# Patient Record
Sex: FEMALE | Race: WHITE | NOT HISPANIC OR LATINO | Employment: FULL TIME | URBAN - METROPOLITAN AREA
[De-identification: names, ages, dates, MRNs, and addresses within clinical notes are randomized per-mention and may not be internally consistent; named-entity substitution may affect disease eponyms.]

---

## 2020-05-26 ENCOUNTER — INITIAL PRENATAL (OUTPATIENT)
Dept: OBSTETRICS AND GYNECOLOGY | Facility: CLINIC | Age: 30
End: 2020-05-26
Payer: MEDICAID

## 2020-05-26 VITALS
HEIGHT: 62 IN | BODY MASS INDEX: 25.64 KG/M2 | SYSTOLIC BLOOD PRESSURE: 102 MMHG | DIASTOLIC BLOOD PRESSURE: 58 MMHG | WEIGHT: 139.31 LBS

## 2020-05-26 DIAGNOSIS — Z3A.20 20 WEEKS GESTATION OF PREGNANCY: Primary | ICD-10-CM

## 2020-05-26 DIAGNOSIS — Z34.92 PRENATAL CARE IN SECOND TRIMESTER: ICD-10-CM

## 2020-05-26 PROCEDURE — 99214 OFFICE O/P EST MOD 30 MIN: CPT | Mod: TH,S$PBB,, | Performed by: NURSE PRACTITIONER

## 2020-05-26 PROCEDURE — 99999 PR PBB SHADOW E&M-NEW PATIENT-LVL III: CPT | Mod: PBBFAC,,, | Performed by: NURSE PRACTITIONER

## 2020-05-26 PROCEDURE — 99203 OFFICE O/P NEW LOW 30 MIN: CPT | Mod: PBBFAC | Performed by: NURSE PRACTITIONER

## 2020-05-26 PROCEDURE — 99214 PR OFFICE/OUTPT VISIT, EST, LEVL IV, 30-39 MIN: ICD-10-PCS | Mod: TH,S$PBB,, | Performed by: NURSE PRACTITIONER

## 2020-05-26 PROCEDURE — 99999 PR PBB SHADOW E&M-NEW PATIENT-LVL III: ICD-10-PCS | Mod: PBBFAC,,, | Performed by: NURSE PRACTITIONER

## 2020-05-26 NOTE — PROGRESS NOTES
CC: transfer OB from [place]    30 y.o.,  at 20w4d     Patient presents today for a transfer initial prenatal visit. She reports she has been receiving regular, routine prenatal care at [facility] .  Patient has not completed a Meet & Greet tour of [facility].  She is here today alone.      Complaints today: none.  Doing well overall. + FM, denies LOF, VB or ctx.     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 She also has a mole on her face that she believes has changes recently. Will be seeing dermatology about this for evaluation.     SOCIAL HISTORY: Denies emotional/mental/physical/sexual violence or abuse. Feels safe at home. Accompanied today by no one. This is pt's 3rd pregnancy. Had 1 SAB. Has one living child - vaginal/41w0d/8#12oz. Pt very concerned because she feels first baby was big and has a lot of anxiety about delivering again. May want epidural.     Patient reports her prepregnancy weight: 130lbs. TW lbs     Patient reports most recent pap smear: 2019, results: normal - denies abnormals in past    ROS  OBSTETRICS:   Contractions No   Bleeding No   Loss of fluid No   Fetal mvmnt:   present    GASTRO:   Nausea No   Vomiting No      OB History    Para Term  AB Living   3 1 1   1 1   SAB TAB Ectopic Multiple Live Births   1       1      # Outcome Date GA Lbr Tavares/2nd Weight Sex Delivery Anes PTL Lv   3 Current            2 Term  41w0d  3.969 kg (8 lb 12 oz)  Vag-Spont   MATHEUS   1 SAB                Dating reviewed  Allergies and problem list reviewed and updated  Medical and surgical history reviewed    PHYSICAL EXAM  BP (!) 102/58   Wt 63.2 kg (139 lb 5.3 oz)   LMP 2020     GENERAL: No acute distress  HEENT: Normocephalic, atruamatic  NEURO: Alert and oriented x3  PSYCH: Calm, normal mood and affect  PULMONARY: Non-labored respiration; no tachypnea  ABD: Soft, gravid, nontender    ASSESSMENT AND  PLAN    MACRINA BARBOZA Problems (from 05/26/20 to present)     No problems associated with this episode.            Patient does have copy of prenatal records here with her today. Records in care everywhere tab. Anatomy scan in care everywhere tab. NIPT - neg  Initial labs and dating u/s reviewed.   Counseled today on proper weight gain based on the [place] [employer]'s recommendations based on her pre-pregnancy weight. Discussed excessive weight gain allowance, which would risk her out of the ABC (and would plan for delivery on L&D), but not midwifery care. Reviewed potential risks to excessive weight gain.  .BMI (prepregnancy)  -- Discussed IOM recommended weight gain of:   Weight category Pre pre BMI  Recommended TWG   Underweight Less than 18.5 28-40    Normal Weight 18.5-24.9  25-35    Overweight 25-29.9  15-25    Obese   30 and greater  11-20   -- Discussed criteria for delivery at [facility] r/t excessive pre-preg weight or excessive weight gain:   Pre-pregnancy BMI over 40 or excess pregnancy weight gain defined as:   Pre-preg BMI < 18.5; Excess weight gain = > 60 pound   Pre-preg BMI 18.5-24.9;  Excess weight gain = > 53 pounds   Pre-preg BMI 25-29.9;  Excess weight gain = > 38 pounds   Pre-preg BMI > 30;  Excess weight gain = > 30 pounds    Review exercise precautions in pregnancy, along with recommendations. She does exercise regularly.   Discussed substances & foods to avoid in pregnancy (i.e. sushi, fish that are high in mercury, deli meat, and unpasteurized cheeses).   Discussed prenatal vitamin options (i.e. stool softener, DHA).    Education regarding CDC recommendations for TDAP in pregnancy, reviewed risks/benefits to this.   Patient was oriented to practice and progression of routine prenatal care.   Reviewed New OB Pregnancy packet & questions answered.    Education regarding warning signs.      Follow up: 4 wks, call or present sooner prn.

## 2020-06-22 ENCOUNTER — TELEPHONE (OUTPATIENT)
Dept: OBSTETRICS AND GYNECOLOGY | Facility: CLINIC | Age: 30
End: 2020-06-22

## 2020-06-22 NOTE — TELEPHONE ENCOUNTER
Returned call to pt.  Pt called with c/o lower abdominal pain that is sharp happens every 2-4 mins and last for a couple of seconds for the last half hour.  Pt reports positive fetal movements,   Denies vaginal bleeding or leaking of fluids.  Pt was advised that she can come to the OB ED for evaluation or that she can go to the closest hospital for evaluation.  Pt wanted to wait until her appointment tomorrow.  I advised pt that she should increase her fluids and take Tylenol (2-reg, 1 xtra strength) and see if that helps.  If no improvement we would recommend her to come in for evaluation.  Pt verbalized understanding       ----- Message from Jefry Easton sent at 6/22/2020 12:09 PM CDT -----  Regarding: Patient Advice  Contact: MACRINA BARBOZA [[id number]]  Name of Who is Calling: MACRINA BARBOZA [[id number]]      What is the request in detail: Patient 6 mth ob, would like to speak with staff in regards to discomfort of sharp abdominal pain; intermittent every few minutes. States she was only doing household chores. Please advise      Can the clinic reply by [facility]: no      What Number to [facility] Back [facility] not [facility]: [phone]

## 2020-06-23 ENCOUNTER — ROUTINE PRENATAL (OUTPATIENT)
Dept: OBSTETRICS AND GYNECOLOGY | Facility: CLINIC | Age: 30
End: 2020-06-23
Payer: MEDICAID

## 2020-06-23 VITALS — SYSTOLIC BLOOD PRESSURE: 110 MMHG | WEIGHT: 145.5 LBS | DIASTOLIC BLOOD PRESSURE: 72 MMHG | BODY MASS INDEX: 26.61 KG/M2

## 2020-06-23 DIAGNOSIS — Z34.90 PREGNANCY, UNSPECIFIED GESTATIONAL AGE: ICD-10-CM

## 2020-06-23 DIAGNOSIS — Z34.93 PRENATAL CARE IN THIRD TRIMESTER: Primary | ICD-10-CM

## 2020-06-23 PROBLEM — D25.9 UTERINE FIBROID: Status: ACTIVE | Noted: 2019-02-14

## 2020-06-23 PROBLEM — G43.909 MIGRAINE HEADACHE: Status: ACTIVE | Noted: 2019-02-14

## 2020-06-23 PROBLEM — G43.009 MIGRAINE WITHOUT AURA, NOT INTRACTABLE, WITHOUT STATUS MIGRAINOSUS: Status: ACTIVE | Noted: 2019-09-25

## 2020-06-23 PROCEDURE — 99212 PR OFFICE/OUTPT VISIT, EST, LEVL II, 10-19 MIN: ICD-10-PCS | Mod: S$PBB,TH,, | Performed by: ADVANCED PRACTICE MIDWIFE

## 2020-06-23 PROCEDURE — 99212 OFFICE O/P EST SF 10 MIN: CPT | Mod: PBBFAC,TH | Performed by: ADVANCED PRACTICE MIDWIFE

## 2020-06-23 PROCEDURE — 99999 PR PBB SHADOW E&M-EST. PATIENT-LVL II: ICD-10-PCS | Mod: PBBFAC,,, | Performed by: ADVANCED PRACTICE MIDWIFE

## 2020-06-23 PROCEDURE — 99999 PR PBB SHADOW E&M-EST. PATIENT-LVL II: CPT | Mod: PBBFAC,,, | Performed by: ADVANCED PRACTICE MIDWIFE

## 2020-06-23 PROCEDURE — 99212 OFFICE O/P EST SF 10 MIN: CPT | Mod: S$PBB,TH,, | Performed by: ADVANCED PRACTICE MIDWIFE

## 2020-06-23 NOTE — PROGRESS NOTES
No chief complaint on file.      30 y.o. female  at 24w4d, by Estimated Date of Delivery: 10/9/20    Complaints today: possible vulvar varicosity. Doing well today.   Reviewed TWG: 15 lbs    ROS  OBSTETRICS:   Contractions No   Bleeding No   Loss of fluid No   Fetal mvmnt present  GASTRO:   Nausea No   Vomiting No      OB History    Para Term  AB Living   3 1 1   1 1   SAB TAB Ectopic Multiple Live Births   1       1      # Outcome Date GA Lbr Tavares/2nd Weight Sex Delivery Anes PTL Lv   3 Current            2 Term  41w0d  3.969 kg (8 lb 12 oz)  Vag-Spont   MATHEUS   1 SAB                Dating reviewed  Allergies and problem list reviewed and updated  Medical and surgical history reviewed  Prenatal labs reviewed and updated    PHYSICAL EXAM  /72   Wt 66 kg (145 lb 8.1 oz)   LMP 2020   BMI 26.61 kg/m²     GENERAL: No acute distress  HEENT: Normocephalic, atraumatic  NEURO: Alert and oriented x3  PSYCH: Normal mood and affect  PULMONARY: Non-labored respiration; no tachypnea  ABD: Soft, gravid, nontender      ASSESSMENT AND PLAN    MACRINA BARBOZA Problems (from 20 to present)     No problems associated with this episode.            Reviewed upcoming 28wk labs, () and orders placed  Education regarding warning signs of PreEclampsia, reviewed normal FM,  labor precautions, and how/when to call.    Follow-up: 4 weeks, call or present sooner PASTORA Barker CNM, MSN  2020  4:40 PM

## 2020-07-03 ENCOUNTER — TELEPHONE (OUTPATIENT)
Dept: OBSTETRICS AND GYNECOLOGY | Facility: HOSPITAL | Age: 30
End: 2020-07-03

## 2020-07-03 NOTE — TELEPHONE ENCOUNTER
Called and spoke with , Duy.     Pt prescribed Tobramax eye drops (steroid and antimicrobial) for a painful corneal abrasion she sustained this morning. Reviewed with her partner that we don't have great evidence on this med but reviewed use of steroids, etc and systemic absorption being minimal.     Zahra

## 2020-07-03 NOTE — TELEPHONE ENCOUNTER
Attempted to call pt back. She called to discuss safety of eye drops in pregnancy per RN. No answer and mailbox full.   Zahra

## 2020-07-05 PROBLEM — Z34.90 PREGNANCY: Status: ACTIVE | Noted: 2020-07-05

## 2020-07-21 ENCOUNTER — LAB VISIT (OUTPATIENT)
Dept: LAB | Facility: OTHER | Age: 30
End: 2020-07-21
Attending: ADVANCED PRACTICE MIDWIFE
Payer: MEDICAID

## 2020-07-21 ENCOUNTER — ROUTINE PRENATAL (OUTPATIENT)
Dept: OBSTETRICS AND GYNECOLOGY | Facility: CLINIC | Age: 30
End: 2020-07-21
Payer: MEDICAID

## 2020-07-21 VITALS — BODY MASS INDEX: 27.42 KG/M2 | SYSTOLIC BLOOD PRESSURE: 98 MMHG | DIASTOLIC BLOOD PRESSURE: 60 MMHG | WEIGHT: 149.94 LBS

## 2020-07-21 DIAGNOSIS — Z34.93 PREGNANT AND NOT YET DELIVERED IN THIRD TRIMESTER: Primary | ICD-10-CM

## 2020-07-21 DIAGNOSIS — Z34.93 PRENATAL CARE IN THIRD TRIMESTER: ICD-10-CM

## 2020-07-21 LAB
BASOPHILS # BLD AUTO: 0.04 K/UL (ref 0–0.2)
BASOPHILS NFR BLD: 0.3 % (ref 0–1.9)
DIFFERENTIAL METHOD: ABNORMAL
EOSINOPHIL # BLD AUTO: 0.2 K/UL (ref 0–0.5)
EOSINOPHIL NFR BLD: 1.9 % (ref 0–8)
ERYTHROCYTE [DISTWIDTH] IN BLOOD BY AUTOMATED COUNT: 12.8 % (ref 11.5–14.5)
GLUCOSE SERPL-MCNC: 72 MG/DL (ref 70–140)
HCT VFR BLD AUTO: 36.2 % (ref 37–48.5)
HGB BLD-MCNC: 11.6 G/DL (ref 12–16)
IMM GRANULOCYTES # BLD AUTO: 0.15 K/UL (ref 0–0.04)
IMM GRANULOCYTES NFR BLD AUTO: 1.3 % (ref 0–0.5)
LYMPHOCYTES # BLD AUTO: 1.4 K/UL (ref 1–4.8)
LYMPHOCYTES NFR BLD: 11.8 % (ref 18–48)
MCH RBC QN AUTO: 29.8 PG (ref 27–31)
MCHC RBC AUTO-ENTMCNC: 32 G/DL (ref 32–36)
MCV RBC AUTO: 93 FL (ref 82–98)
MONOCYTES # BLD AUTO: 0.9 K/UL (ref 0.3–1)
MONOCYTES NFR BLD: 7.6 % (ref 4–15)
NEUTROPHILS # BLD AUTO: 8.9 K/UL (ref 1.8–7.7)
NEUTROPHILS NFR BLD: 77.1 % (ref 38–73)
NRBC BLD-RTO: 0 /100 WBC
PLATELET # BLD AUTO: 188 K/UL (ref 150–350)
PMV BLD AUTO: 10.4 FL (ref 9.2–12.9)
RBC # BLD AUTO: 3.89 M/UL (ref 4–5.4)
WBC # BLD AUTO: 11.52 K/UL (ref 3.9–12.7)

## 2020-07-21 PROCEDURE — 99212 OFFICE O/P EST SF 10 MIN: CPT | Mod: PBBFAC | Performed by: ADVANCED PRACTICE MIDWIFE

## 2020-07-21 PROCEDURE — 99999 PR PBB SHADOW E&M-EST. PATIENT-LVL II: ICD-10-PCS | Mod: PBBFAC,,, | Performed by: ADVANCED PRACTICE MIDWIFE

## 2020-07-21 PROCEDURE — 0502F PR SUBSEQUENT PRENATAL CARE: ICD-10-PCS | Mod: ,,, | Performed by: ADVANCED PRACTICE MIDWIFE

## 2020-07-21 PROCEDURE — 85025 COMPLETE CBC W/AUTO DIFF WBC: CPT

## 2020-07-21 PROCEDURE — 36415 COLL VENOUS BLD VENIPUNCTURE: CPT

## 2020-07-21 PROCEDURE — 99999 PR PBB SHADOW E&M-EST. PATIENT-LVL II: CPT | Mod: PBBFAC,,, | Performed by: ADVANCED PRACTICE MIDWIFE

## 2020-07-21 PROCEDURE — 82950 GLUCOSE TEST: CPT

## 2020-07-21 PROCEDURE — 0502F SUBSEQUENT PRENATAL CARE: CPT | Mod: ,,, | Performed by: ADVANCED PRACTICE MIDWIFE

## 2020-07-21 RX ORDER — TOBRAMYCIN AND DEXAMETHASONE 3; 1 MG/ML; MG/ML
SUSPENSION/ DROPS OPHTHALMIC
COMMUNITY
Start: 2020-07-03 | End: 2020-10-15

## 2020-07-21 NOTE — PROGRESS NOTES
Chief Complaint   Patient presents with    Routine Prenatal Visit       30 y.o. female  at 28w4d by Estimated Date of Delivery: 10/9/20    Complaints today: none - feeling much better overall  Reviewed TWlbs    ROS  OBSTETRICS:   Contractions No   Bleeding No   Loss of fluid No   Fetal mvmnt +  GASTRO:   Nausea No   Vomiting No      OB History    Para Term  AB Living   3 1 1   1 1   SAB TAB Ectopic Multiple Live Births   1       1      # Outcome Date GA Lbr Tavares/2nd Weight Sex Delivery Anes PTL Lv   3 Current            2 Term  41w0d  3.969 kg (8 lb 12 oz)  Vag-Spont   MATHEUS   1 SAB                Dating reviewed  Allergies and problem list reviewed and updated  Medical and surgical history reviewed  Prenatal labs reviewed and updated    PHYSICAL EXAM  BP 98/60   Wt 68 kg (149 lb 14.6 oz)   LMP 2020   BMI 27.42 kg/m²     GENERAL: No acute distress  HEENT: Normocephalic, atraumatic  NEURO: Alert and oriented x3  PSYCH: Normal mood and affect  PULMONARY: Non-labored respiration; no tachypnea  ABD: Soft, gravid, nontender.    ASSESSMENT AND PLAN    MACRINA BARBOZA Problems (from 20 to present)     Problem Noted Resolved    Pregnancy 2020 by Rajani Barker CNM No    Overview Signed 2020  9:34 PM by Rajani Barker CNM     Prepregnancy BMI: 23 (ABC max wt: 53lb)  Pap:   Dating - per LMP confirmed via 7 wk sonogram  U/S -see care everywhere  Aneuploidy screening -NIPT normal  Blood type: A pos. Rhogam: Date:  GDM screen -   Vaccines - [ ]Flu [ ]TDAP  GBS  [ ]Consents  Contraception -  Peds -   Circ -                  Completing 28wk labs today.   Blood type:A +     Reviewed warning signs, normal FKCs,  labor precautions and how/when to call.    Follow-up: 2 weeks, call or present sooner PRN.  Birth Center Risk Assessment: 0- Meets birth center guidelines    0- CNM management in ABC  1- CNM management on L&D  2- Consultation with OB to develop  plan of  care  3- Collaborative CNM/OB management with delivery on L&D  Permanent referral of care to MD

## 2020-08-04 ENCOUNTER — ROUTINE PRENATAL (OUTPATIENT)
Dept: OBSTETRICS AND GYNECOLOGY | Facility: CLINIC | Age: 30
End: 2020-08-04
Payer: MEDICAID

## 2020-08-04 VITALS
BODY MASS INDEX: 27.82 KG/M2 | DIASTOLIC BLOOD PRESSURE: 64 MMHG | WEIGHT: 152.13 LBS | SYSTOLIC BLOOD PRESSURE: 100 MMHG

## 2020-08-04 DIAGNOSIS — N76.0 VAGINAL INFECTION: ICD-10-CM

## 2020-08-04 DIAGNOSIS — Z34.90 PREGNANCY WITH ONE FETUS, ANTEPARTUM: Primary | ICD-10-CM

## 2020-08-04 PROCEDURE — 87510 GARDNER VAG DNA DIR PROBE: CPT

## 2020-08-04 PROCEDURE — 99213 PR OFFICE/OUTPT VISIT, EST, LEVL III, 20-29 MIN: ICD-10-PCS | Mod: TH,S$PBB,, | Performed by: ADVANCED PRACTICE MIDWIFE

## 2020-08-04 PROCEDURE — 99999 PR PBB SHADOW E&M-EST. PATIENT-LVL II: ICD-10-PCS | Mod: PBBFAC,,, | Performed by: ADVANCED PRACTICE MIDWIFE

## 2020-08-04 PROCEDURE — 87661 TRICHOMONAS VAGINALIS AMPLIF: CPT

## 2020-08-04 PROCEDURE — 99213 OFFICE O/P EST LOW 20 MIN: CPT | Mod: TH,S$PBB,, | Performed by: ADVANCED PRACTICE MIDWIFE

## 2020-08-04 PROCEDURE — 87801 DETECT AGNT MULT DNA AMPLI: CPT

## 2020-08-04 PROCEDURE — 99999 PR PBB SHADOW E&M-EST. PATIENT-LVL II: CPT | Mod: PBBFAC,,, | Performed by: ADVANCED PRACTICE MIDWIFE

## 2020-08-04 PROCEDURE — 99212 OFFICE O/P EST SF 10 MIN: CPT | Mod: PBBFAC,TH | Performed by: ADVANCED PRACTICE MIDWIFE

## 2020-08-04 PROCEDURE — 87481 CANDIDA DNA AMP PROBE: CPT | Mod: 59

## 2020-08-04 NOTE — PROGRESS NOTES
No chief complaint on file.      30 y.o. female  at 30w4d, by Estimated Date of Delivery: 10/9/20    Complaints today: Here alone. Doing well today.  Questions answered, Having odor vaginally since ~ 20wks, whitish sticky d/c. Sticky d/c, no itching or irritation.   Having random BH  Culture done      Reviewed TW lbs    ROS  OBSTETRICS:   Contractions No   Bleeding No   Loss of fluid No   Fetal mvmnt yes  GASTRO:   Nausea No   Vomiting No      OB History    Para Term  AB Living   3 1 1   1 1   SAB TAB Ectopic Multiple Live Births   1       1      # Outcome Date GA Lbr Tavares/2nd Weight Sex Delivery Anes PTL Lv   3 Current            2 Term  41w0d  3.969 kg (8 lb 12 oz)  Vag-Spont   MATHEUS   1 SAB                Dating reviewed  Allergies and problem list reviewed and updated  Medical and surgical history reviewed  Prenatal labs reviewed and updated    PHYSICAL EXAM  /64   Wt 69 kg (152 lb 1.9 oz)   LMP 2020   BMI 27.82 kg/m²     GENERAL: No acute distress  HEENT: Normocephalic, atraumatic  NEURO: Alert and oriented x3  PSYCH: Normal mood and affect  PULMONARY: Non-labored respiration; no tachypnea  ABD: Soft, gravid, nontender.      ASSESSMENT AND PLAN    MACRINA BARBOZA Problems (from 20 to present)     Problem Noted Resolved    Pregnancy 2020 by Rajani Barker CNM No    Overview Signed 2020  9:34 PM by Rajani Barker CNM     Prepregnancy BMI: 23 (ABC max wt: 53lb)  Pap:   Dating - per LMP confirmed via 7 wk sonogram  U/S -see care everywhere  Aneuploidy screening -NIPT normal  Blood type: A pos. Rhogam: Date:  GDM screen -   Vaccines - [ ]Flu [ ]TDAP  GBS  [ ]Consents  Contraception -  Peds -   Circ -                  Reviewed 28wk labs  Declines TDAP today   Is not taking Childbirth Education classes.  Ordered growth U/S      Reviewed warning signs, normal FM,  labor precautions, and how/when to call.    Birth Center Risk Assessment: 0- Meets birth  center guidelines    0- CNM management in ABC  1- CNM management on L&D  2- Consultation with OB to develop  plan of care  3- Collaborative CNM/OB management with delivery on L&D  4- Permanent referral of care to MD      Follow-up: 2 weeks, call or present sooner PRN

## 2020-08-06 ENCOUNTER — TELEPHONE (OUTPATIENT)
Dept: OBSTETRICS AND GYNECOLOGY | Facility: CLINIC | Age: 30
End: 2020-08-06

## 2020-08-06 NOTE — TELEPHONE ENCOUNTER
Returned call to pt.  Advised pt that lab was still in process.  Pt also request to know if she could get over the counter Monistat to help treat the discharge and her discomfort.  Spoke with ROSALIA Sweeney and was advised OK for pt to use Monistat 7 day tx.  Pt verbalized understanding and was advised we will contact with results when they are received         ----- Message from Cece Penaloza sent at 8/6/2020  9:35 AM CDT -----  Name of Who is Calling: MACRINA BARBOZA [[id number]]    What is the request in detail: Would like to speak with staff in regards to test results. Please contact to further discuss and advise      Can the clinic reply by [facility]: no    What Number to Call Back [facility] not [facility]: [phone]

## 2020-08-07 LAB
CANDIDA RRNA VAG QL PROBE: NEGATIVE
G VAGINALIS RRNA GENITAL QL PROBE: NEGATIVE
T VAGINALIS RRNA GENITAL QL PROBE: NEGATIVE

## 2020-08-17 ENCOUNTER — ROUTINE PRENATAL (OUTPATIENT)
Dept: OBSTETRICS AND GYNECOLOGY | Facility: CLINIC | Age: 30
End: 2020-08-17
Payer: MEDICAID

## 2020-08-17 VITALS
SYSTOLIC BLOOD PRESSURE: 102 MMHG | BODY MASS INDEX: 28.49 KG/M2 | WEIGHT: 155.75 LBS | DIASTOLIC BLOOD PRESSURE: 58 MMHG

## 2020-08-17 DIAGNOSIS — Z13.9 RISK AND FUNCTIONAL ASSESSMENT: ICD-10-CM

## 2020-08-17 DIAGNOSIS — N89.8 VAGINAL ODOR: ICD-10-CM

## 2020-08-17 DIAGNOSIS — O22.10 VULVAR VARICES DURING PREGNANCY: ICD-10-CM

## 2020-08-17 DIAGNOSIS — Z34.90 PREGNANCY, UNSPECIFIED GESTATIONAL AGE: Primary | ICD-10-CM

## 2020-08-17 PROCEDURE — 87510 GARDNER VAG DNA DIR PROBE: CPT

## 2020-08-17 PROCEDURE — 0502F PR SUBSEQUENT PRENATAL CARE: ICD-10-PCS | Mod: ,,, | Performed by: ADVANCED PRACTICE MIDWIFE

## 2020-08-17 PROCEDURE — 87086 URINE CULTURE/COLONY COUNT: CPT

## 2020-08-17 PROCEDURE — 87481 CANDIDA DNA AMP PROBE: CPT | Mod: 59

## 2020-08-17 PROCEDURE — 0502F SUBSEQUENT PRENATAL CARE: CPT | Mod: ,,, | Performed by: ADVANCED PRACTICE MIDWIFE

## 2020-08-17 PROCEDURE — 99999 PR PBB SHADOW E&M-EST. PATIENT-LVL III: ICD-10-PCS | Mod: PBBFAC,,, | Performed by: ADVANCED PRACTICE MIDWIFE

## 2020-08-17 PROCEDURE — 87801 DETECT AGNT MULT DNA AMPLI: CPT

## 2020-08-17 PROCEDURE — 99999 PR PBB SHADOW E&M-EST. PATIENT-LVL III: CPT | Mod: PBBFAC,,, | Performed by: ADVANCED PRACTICE MIDWIFE

## 2020-08-17 PROCEDURE — 99213 OFFICE O/P EST LOW 20 MIN: CPT | Mod: PBBFAC,TH | Performed by: ADVANCED PRACTICE MIDWIFE

## 2020-08-17 NOTE — PROGRESS NOTES
Chief Complaint   Patient presents with    Routine Prenatal Visit     30 y.o. female  at 32w2d, by Estimated Date of Delivery: 10/9/20    Doing well overall today.    Reviewed TW lbs    BMI  -- Discussed IOM recommended weight gain of:   Normal Weight 18.5-24.9  25-35   -- Discussed criteria for delivery at [facility] r/t excessive pre-preg weight or excessive weight gain:   Pre-pregnancy BMI over 40 or excess pregnancy weight gain defined as:   Pre-preg BMI 18.5-24.9;  Excess weight gain = > 53 pounds    ROS  OBSTETRICS:   Contractions No   Bleeding No   Loss of fluid No   Fetal mvmnt Yes  GASTRO:   Nausea No   Vomiting No      OB History    Para Term  AB Living   3 1 1   1 1   SAB TAB Ectopic Multiple Live Births   1       1      # Outcome Date GA Lbr Tavares/2nd Weight Sex Delivery Anes PTL Lv   3 Current            2 Term  41w0d  3.969 kg (8 lb 12 oz)  Vag-Spont   MATHEUS   1 SAB                Dating reviewed  Allergies and problem list reviewed and updated  Medical and surgical history reviewed  Prenatal labs reviewed and updated    PHYSICAL EXAM  BP (!) 102/58   Wt 70.7 kg (155 lb 12.1 oz)   LMP 2020   BMI 28.49 kg/m²     GENERAL: No acute distress  HEENT: Normocephalic, atraumatic  NEURO: Alert and oriented x3  PSYCH: Normal mood and affect  PULMONARY: Non-labored respiration; no tachypnea  ABD: Soft, gravid, nontender.      ASSESSMENT AND PLAN    MACRINA BARBOZA Problems (from 20 to present)     Problem Noted Resolved    Pregnancy 2020 by Rajani Barker CNM No    Overview Signed 2020  9:34 PM by Rajani Barker CNM     Prepregnancy BMI: 23 (ABC max wt: 53lb)  Pap:   Dating - per LMP confirmed via 7 wk sonogram  U/S -see care everywhere  Aneuploidy screening -NIPT normal  Blood type: A pos. Rhogam: Date:  GDM screen -   Vaccines - [ ]Flu [ ]TDAP  GBS  [ ]Consents  Contraception -  Peds -   Circ -                  Patient plans to breast feed - reviewed breast feeding  "class here.  Will send message about classes via [facility].    Pediatrician: Thinking about it    Need T&S repeat with 36 wk labs because other one is from outside facility. Patient agrees to this.    36 wk growth US is scheduled for . It's a girl!! Will check to see her size. Last birth was traumatic with large baby. Pt states that she may want epidural.    Meds: PNV    She reports noticing white discharge and an odd smell over the past several weeks. She states that the smell is not "fishy" and just smells "different than usual". She reports vulvar varicosities, and I visualized that she does have mild bilateral vulvar varicosities - these have not been bothering her, but she wanted me to take a look to confirm. Also collected vaginosis swab. Did not notice any odor while collecting specimen, and did not notice any vaginal discharge.    Reviewed warning signs, normal FM,  labor precautions, and how/when to call.  Confirmed pt has after-hours number.    Follow-up: 2 weeks, call or present sooner PRN      "

## 2020-08-18 LAB
BACTERIA UR CULT: NORMAL
BACTERIA UR CULT: NORMAL
CANDIDA RRNA VAG QL PROBE: NEGATIVE
G VAGINALIS RRNA GENITAL QL PROBE: NEGATIVE
T VAGINALIS RRNA GENITAL QL PROBE: NEGATIVE

## 2020-08-24 PROBLEM — Z13.9 RISK AND FUNCTIONAL ASSESSMENT: Status: ACTIVE | Noted: 2020-08-24

## 2020-08-24 PROBLEM — O22.10: Status: ACTIVE | Noted: 2020-08-24

## 2020-09-02 ENCOUNTER — ROUTINE PRENATAL (OUTPATIENT)
Dept: OBSTETRICS AND GYNECOLOGY | Facility: CLINIC | Age: 30
End: 2020-09-02
Payer: MEDICAID

## 2020-09-02 ENCOUNTER — CLINICAL SUPPORT (OUTPATIENT)
Dept: OBSTETRICS AND GYNECOLOGY | Facility: CLINIC | Age: 30
End: 2020-09-02
Payer: MEDICAID

## 2020-09-02 VITALS
DIASTOLIC BLOOD PRESSURE: 70 MMHG | SYSTOLIC BLOOD PRESSURE: 106 MMHG | BODY MASS INDEX: 28.55 KG/M2 | WEIGHT: 156.06 LBS

## 2020-09-02 DIAGNOSIS — Z23 NEED FOR DIPHTHERIA-TETANUS-PERTUSSIS (TDAP) VACCINE: Primary | ICD-10-CM

## 2020-09-02 DIAGNOSIS — Z34.93 PREGNANT AND NOT YET DELIVERED IN THIRD TRIMESTER: Primary | ICD-10-CM

## 2020-09-02 PROCEDURE — 99999 PR PBB SHADOW E&M-EST. PATIENT-LVL II: CPT | Mod: PBBFAC,,, | Performed by: ADVANCED PRACTICE MIDWIFE

## 2020-09-02 PROCEDURE — 99999 PR PBB SHADOW E&M-EST. PATIENT-LVL I: CPT | Mod: PBBFAC,,,

## 2020-09-02 PROCEDURE — 99212 OFFICE O/P EST SF 10 MIN: CPT | Mod: PBBFAC,25 | Performed by: ADVANCED PRACTICE MIDWIFE

## 2020-09-02 PROCEDURE — 99212 OFFICE O/P EST SF 10 MIN: CPT | Mod: TH,S$PBB,, | Performed by: ADVANCED PRACTICE MIDWIFE

## 2020-09-02 PROCEDURE — 90471 IMMUNIZATION ADMIN: CPT | Mod: PBBFAC

## 2020-09-02 PROCEDURE — 99999 PR PBB SHADOW E&M-EST. PATIENT-LVL II: ICD-10-PCS | Mod: PBBFAC,,, | Performed by: ADVANCED PRACTICE MIDWIFE

## 2020-09-02 PROCEDURE — 99999 PR PBB SHADOW E&M-EST. PATIENT-LVL I: ICD-10-PCS | Mod: PBBFAC,,,

## 2020-09-02 PROCEDURE — 99212 PR OFFICE/OUTPT VISIT, EST, LEVL II, 10-19 MIN: ICD-10-PCS | Mod: TH,S$PBB,, | Performed by: ADVANCED PRACTICE MIDWIFE

## 2020-09-02 NOTE — PROGRESS NOTES
No chief complaint on file.      30 y.o. female  at 34w5d, by Estimated Date of Delivery: 10/9/20    Doing well today.  Reviewed TW lbs    ROS  OBSTETRICS:   Contractions No   Bleeding No   Loss of fluid No   Fetal mvmnt +  GASTRO:   Nausea No   Vomiting No      OB History    Para Term  AB Living   3 1 1   1 1   SAB TAB Ectopic Multiple Live Births   1       1      # Outcome Date GA Lbr Tavares/2nd Weight Sex Delivery Anes PTL Lv   3 Current            2 Term  41w0d  3.969 kg (8 lb 12 oz)  Vag-Spont   MATHEUS   1 SAB                Dating reviewed  Allergies and problem list reviewed and updated  Medical and surgical history reviewed  Prenatal labs reviewed and updated    PHYSICAL EXAM  /70   Wt 70.8 kg (156 lb 1.4 oz)   LMP 2020   BMI 28.55 kg/m²     GENERAL: No acute distress  HEENT: Normocephalic, atraumatic  NEURO: Alert and oriented x3  PSYCH: Normal mood and affect  PULMONARY: Non-labored respiration; no tachypnea  ABD: Soft, gravid, nontender.      ASSESSMENT AND PLAN    MACRINA BARBOZA Problems (from 20 to present)     Problem Noted Resolved    Birth Center Risk Assessment: 0- Meets birth center guidelines 2020 by Fozia Diamond CNM No    Overview Signed 2020  5:51 PM by Fozia Diamond CNM     Birth Center Risk Assessment: 0- Meets birth center guidelines    0- CNM management in ABC  1- CNM management on L&D  2- Consultation with OB to develop  plan of care  3- Collaborative CNM/OB management with delivery on L&D  4- Permanent referral of care to MD           Vulvar varices during pregnancy 2020 by Fozia Diamond CNM No    Pregnancy 2020 by Rajani Barker CNM No    Overview Addendum 2020  5:54 PM by Fozia Diamond CNM     Prepregnancy BMI: 23 (ABC max wt: 53lb)  Pap: ??  Dating - per LMP confirmed via 7 wk sonogram  U/S - see care everywhere  Aneuploidy screening - NIPT normal  Blood type: A POS  GDM screen - Passed  1 hr  Vaccines - [ ]Flu [ ]TDAP  GBS  [ ]Consents  Contraception -  Peds -   Circ - N/A                 Reviewed upcoming 36wk labs.   Reviewed patient does NOT have a history of genital HSV.     Reviewed ABC risk assessment with the patient:    Birth Center Risk Assessment: 0- Meets birth center guidelines    0- CNM management in ABC  1- CNM management on L&D  2- Consultation with OB to develop  plan of care  3- Collaborative CNM/OB management with delivery on L&D  4- Permanent referral of care to MD    She understands she is a candidate for the ABC. Reviewed potential risks which could arise, that could change this status.    Reviewed warning signs, normal FM,  labor precautions, and how/when to call. Confirmed pt has after-hours number.    Follow-up: 2 weeks, call or present sooner PRN

## 2020-09-09 ENCOUNTER — TELEPHONE (OUTPATIENT)
Dept: OBSTETRICS AND GYNECOLOGY | Facility: CLINIC | Age: 30
End: 2020-09-09

## 2020-09-09 NOTE — TELEPHONE ENCOUNTER
Returned call to pt.  Pt with complaints of contractions last night that were 10 mins apart and lasted I min for approx 1 hour.  Pt stated that she drank water and took Tylenol and then seemed to subside.  Pt was provided with PTL precautions an instructed to come to the ED here at [facility] [place] or the closest hospital for evaluation if this should happen again. Pt verbalized understanding    Pt also inquired as to should she go to work tomorrow.  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  I advised pt that if she is feeling okay she can go to the classes but if she would prefer to stay home to rest that is okay as well.  Pt again verbalized understanding and stated that she would prefer to go to work unless she has any additional episodes.         ----- Message from Marcelo Talamantes, Patient Care Assistant sent at 9/9/2020 10:00 AM CDT -----  Name of Who is Calling: MACRINA BARBOZA [[id number]]    What is the request in detail: Requesting a call back in regards of having contraction, stating yesterday night 9/09 it was severe but it has resolved and wanted to notify staff. Please contact to further discuss and advise      Can the clinic reply by [facility]: No    What [facility] Back [facility]:   [phone]

## 2020-09-16 ENCOUNTER — ROUTINE PRENATAL (OUTPATIENT)
Dept: OBSTETRICS AND GYNECOLOGY | Facility: CLINIC | Age: 30
End: 2020-09-16
Payer: MEDICAID

## 2020-09-16 ENCOUNTER — HOSPITAL ENCOUNTER (OUTPATIENT)
Dept: PERINATAL CARE | Facility: OTHER | Age: 30
Discharge: HOME | End: 2020-09-16
Attending: ADVANCED PRACTICE MIDWIFE
Payer: MEDICAID

## 2020-09-16 VITALS
WEIGHT: 158.94 LBS | SYSTOLIC BLOOD PRESSURE: 100 MMHG | BODY MASS INDEX: 29.07 KG/M2 | DIASTOLIC BLOOD PRESSURE: 66 MMHG

## 2020-09-16 DIAGNOSIS — Z3A.36 36 WEEKS GESTATION OF PREGNANCY: Primary | ICD-10-CM

## 2020-09-16 DIAGNOSIS — Z34.90 PREGNANCY WITH ONE FETUS, ANTEPARTUM: ICD-10-CM

## 2020-09-16 PROCEDURE — 76816 PR  US,PREGNANT UTERUS,F/U,TRANSABD APP: ICD-10-PCS | Mod: 26,,, | Performed by: OBSTETRICS & GYNECOLOGY

## 2020-09-16 PROCEDURE — 0502F PR SUBSEQUENT PRENATAL CARE: ICD-10-PCS | Mod: S$PBB,,, | Performed by: ADVANCED PRACTICE MIDWIFE

## 2020-09-16 PROCEDURE — 76816 OB US FOLLOW-UP PER FETUS: CPT | Mod: 26,,, | Performed by: OBSTETRICS & GYNECOLOGY

## 2020-09-16 PROCEDURE — 76815 OB US LIMITED FETUS(S): CPT

## 2020-09-16 PROCEDURE — 99999 PR PBB SHADOW E&M-EST. PATIENT-LVL II: ICD-10-PCS | Mod: PBBFAC,,, | Performed by: ADVANCED PRACTICE MIDWIFE

## 2020-09-16 PROCEDURE — 87081 CULTURE SCREEN ONLY: CPT

## 2020-09-16 PROCEDURE — 99212 OFFICE O/P EST SF 10 MIN: CPT | Mod: PBBFAC,25 | Performed by: ADVANCED PRACTICE MIDWIFE

## 2020-09-16 PROCEDURE — 99999 PR PBB SHADOW E&M-EST. PATIENT-LVL II: CPT | Mod: PBBFAC,,, | Performed by: ADVANCED PRACTICE MIDWIFE

## 2020-09-16 PROCEDURE — 0502F SUBSEQUENT PRENATAL CARE: CPT | Mod: S$PBB,,, | Performed by: ADVANCED PRACTICE MIDWIFE

## 2020-09-18 LAB — BACTERIA SPEC AEROBE CULT: NORMAL

## 2020-09-23 ENCOUNTER — ROUTINE PRENATAL (OUTPATIENT)
Dept: OBSTETRICS AND GYNECOLOGY | Facility: CLINIC | Age: 30
End: 2020-09-23
Payer: MEDICAID

## 2020-09-23 VITALS
WEIGHT: 161.38 LBS | BODY MASS INDEX: 29.52 KG/M2 | SYSTOLIC BLOOD PRESSURE: 106 MMHG | DIASTOLIC BLOOD PRESSURE: 70 MMHG

## 2020-09-23 DIAGNOSIS — Z34.90 PREGNANCY WITH ONE FETUS, ANTEPARTUM: Primary | ICD-10-CM

## 2020-09-23 PROCEDURE — 99213 OFFICE O/P EST LOW 20 MIN: CPT | Mod: TH,S$PBB,, | Performed by: ADVANCED PRACTICE MIDWIFE

## 2020-09-23 PROCEDURE — 99999 PR PBB SHADOW E&M-EST. PATIENT-LVL II: CPT | Mod: PBBFAC,,, | Performed by: ADVANCED PRACTICE MIDWIFE

## 2020-09-23 PROCEDURE — 99999 PR PBB SHADOW E&M-EST. PATIENT-LVL II: ICD-10-PCS | Mod: PBBFAC,,, | Performed by: ADVANCED PRACTICE MIDWIFE

## 2020-09-23 PROCEDURE — 99213 PR OFFICE/OUTPT VISIT, EST, LEVL III, 20-29 MIN: ICD-10-PCS | Mod: TH,S$PBB,, | Performed by: ADVANCED PRACTICE MIDWIFE

## 2020-09-23 PROCEDURE — 99212 OFFICE O/P EST SF 10 MIN: CPT | Mod: PBBFAC,TH | Performed by: ADVANCED PRACTICE MIDWIFE

## 2020-09-23 NOTE — PROGRESS NOTES
"  Chief Complaint   Patient presents with    Routine Prenatal Visit       30 y.o. female  at 37w5d, by Estimated Date of Delivery: 10/9/20    Complaints today: Here alone. Doing well today.  Having "lightning crouch", sometimes very painful.  2 hour drive here.Talked at length and reassured her.  Ready, a lot of pressure.    Reviewed TW lbs    ROS  OBSTETRICS:   Contractions: Nothing regular   Bleeding No   Loss of fluid No   Fetal mvmnt yes  GASTRO:   Nausea No   Vomiting No      OB History    Para Term  AB Living   3 1 1   1 1   SAB TAB Ectopic Multiple Live Births   1       1      # Outcome Date GA Lbr Tavares/2nd Weight Sex Delivery Anes PTL Lv   3 Current            2 Term  41w0d  3.969 kg (8 lb 12 oz)  Vag-Spont   MATHEUS   1 SAB                Dating reviewed  Allergies and problem list reviewed and updated  Medical and surgical history reviewed  Prenatal labs reviewed and updated    PHYSICAL EXAM  /70   Wt 73.2 kg (161 lb 6 oz)   LMP 2020   BMI 29.52 kg/m²     GENERAL: No acute distress  HEENT: Normocephalic, atraumatic  NEURO: Alert and oriented x3  PSYCH: Normal mood and affect  PULMONARY: Non-labored respiration; no tachypnea  ABD: Soft, gravid, nontender.      ASSESSMENT AND PLAN    MACRINA BARBOZA Problems (from 20 to present)     Problem Noted Resolved    Birth Center Risk Assessment: 0- Meets birth center guidelines 2020 by Fozia Diamond CNM No    Overview Signed 2020  5:51 PM by Fozia Diamond CNM     Birth Center Risk Assessment: 0- Meets birth center guidelines    0- CNM management in ABC  1- CNM management on L&D  2- Consultation with OB to develop  plan of care  3- Collaborative CNM/OB management with delivery on L&D  4- Permanent referral of care to MD           Vulvar varices during pregnancy 2020 by Fozia Diamond CNM No    Pregnancy 2020 by Rajani Barker CNM No    Overview Addendum 2020  9:46 AM by Joseph" SYLVIE Cortes CNM     Prepregnancy BMI: 23 (ABC max wt: 53lb)  Pap: ??  Dating - per LMP confirmed via 7 wk sonogram  U/S - see care everywhere  Aneuploidy screening - NIPT normal  Blood type: A POS  GDM screen - Passed 1 hr  Vaccines - [ ]Flu [ ]TDAP  GBS neg  [ ]Consents  Contraception -  Peds -   Circ - N/A                 Taking consents home  Reviewed GBS neg and need for intrapartum abx  Reviewed repeat HIV/RPR neg/NR  Education regarding labor precautions.    Reviewed warning signs, normal FM, and how/when to call.      Follow-up: 1 week, call or present sooner PRN

## 2020-09-28 ENCOUNTER — ROUTINE PRENATAL (OUTPATIENT)
Dept: OBSTETRICS AND GYNECOLOGY | Facility: CLINIC | Age: 30
End: 2020-09-28
Payer: MEDICAID

## 2020-09-28 VITALS
BODY MASS INDEX: 29.42 KG/M2 | WEIGHT: 160.81 LBS | DIASTOLIC BLOOD PRESSURE: 68 MMHG | SYSTOLIC BLOOD PRESSURE: 112 MMHG

## 2020-09-28 DIAGNOSIS — Z34.93 PREGNANT AND NOT YET DELIVERED, THIRD TRIMESTER: Primary | ICD-10-CM

## 2020-09-28 PROCEDURE — 99212 PR OFFICE/OUTPT VISIT, EST, LEVL II, 10-19 MIN: ICD-10-PCS | Mod: TH,S$PBB,, | Performed by: ADVANCED PRACTICE MIDWIFE

## 2020-09-28 PROCEDURE — 99999 PR PBB SHADOW E&M-EST. PATIENT-LVL I: ICD-10-PCS | Mod: PBBFAC,,, | Performed by: ADVANCED PRACTICE MIDWIFE

## 2020-09-28 PROCEDURE — 99212 OFFICE O/P EST SF 10 MIN: CPT | Mod: TH,S$PBB,, | Performed by: ADVANCED PRACTICE MIDWIFE

## 2020-09-28 PROCEDURE — 99211 OFF/OP EST MAY X REQ PHY/QHP: CPT | Mod: PBBFAC | Performed by: ADVANCED PRACTICE MIDWIFE

## 2020-09-28 PROCEDURE — 99999 PR PBB SHADOW E&M-EST. PATIENT-LVL I: CPT | Mod: PBBFAC,,, | Performed by: ADVANCED PRACTICE MIDWIFE

## 2020-09-28 NOTE — LETTER
September 28, 2020      [facility] [address]  [address]  [facility] [address]  Phone: [phone]  Fax: [phone]       Patient: Catie Pendleton   YOB: 1990  Date of Visit: 09/28/2020    To Whom It May Concern:    Antony Pendleton  was at [facility] on 09/28/2020. She is required to stay within one hour of her care provider from this point forward. If you have any questions or concerns, or if I can be of further assistance, please do not hesitate to contact me.    Sincerely,      Zahra Valle CNM      Detail Level: Generalized Instructions: This plan will send the code FBSD to the PM system.  DO NOT or CHANGE the price. Price (Do Not Change): 0.00

## 2020-09-28 NOTE — PROGRESS NOTES
No chief complaint on file.      30 y.o. female  at 38w3d, by Estimated Date of Delivery: 10/9/20  Doing well today.  Reviewed TW lbs    ROS  OBSTETRICS:   Contractions: Nothing regular   Bleeding No   Loss of fluid No   Fetal mvmnt +  GASTRO:   Nausea No   Vomiting No      OB History    Para Term  AB Living   3 1 1   1 1   SAB TAB Ectopic Multiple Live Births   1       1      # Outcome Date GA Lbr Tavares/2nd Weight Sex Delivery Anes PTL Lv   3 Current            2 Term  41w0d  3.969 kg (8 lb 12 oz)  Vag-Spont   MATHEUS   1 SAB                Dating reviewed  Allergies and problem list reviewed and updated  Medical and surgical history reviewed  Prenatal labs reviewed and updated    PHYSICAL EXAM  /68   Wt 73 kg (160 lb 13.2 oz)   LMP 2020   BMI 29.42 kg/m²     GENERAL: No acute distress  HEENT: Normocephalic, atraumatic  NEURO: Alert and oriented x3  PSYCH: Normal mood and affect  PULMONARY: Non-labored respiration; no tachypnea  ABD: Soft, gravid, nontender.      ASSESSMENT AND PLAN    MACRINA BARBOZA Problems (from 20 to present)     Problem Noted Resolved    Birth Center Risk Assessment: 0- Meets birth center guidelines 2020 by Fozia Diamond CNM No    Overview Signed 2020  5:51 PM by Fozia Diamond CNM     Birth Center Risk Assessment: 0- Meets birth center guidelines    0- CNM management in ABC  1- CNM management on L&D  2- Consultation with OB to develop  plan of care  3- Collaborative CNM/OB management with delivery on L&D  4- Permanent referral of care to MD           Vulvar varices during pregnancy 2020 by Fozia Diamond CNM No    Pregnancy 2020 by Rajani Barker CNM No    Overview Addendum 2020  9:46 AM by Joseph Cortes CNM     Prepregnancy BMI: 23 (ABC max wt: 53lb)  Pap: ??  Dating - per LMP confirmed via 7 wk sonogram  U/S - see care everywhere  Aneuploidy screening - NIPT normal  Blood type: A POS  GDM screen -  Passed 1 hr  Vaccines - [ ]Flu [ ]TDAP  GBS neg  [ ]Consents  Contraception -  Peds -   Circ - N/A                 Delivery consents signed and pt has with her - plans to bring in hospital bag.  Discussed plans for support people during labor - she plans to have Jose.    Reviewed warning signs, normal FM, and how/when to call.      Follow-up: 1 week, call or present sooner PRN  Birth Center Risk Assessment: 0- Meets birth center guidelines    5- CNM management in ABC  6- CNM management on L&D  7- Consultation with OB to develop  plan of care  8- Collaborative CNM/OB management with delivery on L&D  Permanent referral of care to MD

## 2020-10-05 NOTE — PROGRESS NOTES
No chief complaint on file.      30 y.o. female  at 39w2d, by Estimated Date of Delivery: 10/9/20    Complaints today: none. Doing well today.  Reviewed TW lbs    ROS  OBSTETRICS:   Contractions No   Bleeding No   Loss of fluid No   Fetal mvmnt present  GASTRO:   Nausea No   Vomiting No      OB History    Para Term  AB Living   3 1 1   1 1   SAB TAB Ectopic Multiple Live Births   1       1      # Outcome Date GA Lbr Tavares/2nd Weight Sex Delivery Anes PTL Lv   3 Current            2 Term  41w0d  3.969 kg (8 lb 12 oz)  Vag-Spont   MATHEUS   1 SAB                Dating reviewed  Allergies and problem list reviewed and updated  Medical and surgical history reviewed  Prenatal labs reviewed and updated    PHYSICAL EXAM  /66   Wt 72.1 kg (158 lb 15.2 oz)   LMP 2020   BMI 29.07 kg/m²     GENERAL: No acute distress  HEENT: Normocephalic, atraumatic  NEURO: Alert and oriented x3  PSYCH: Normal mood and affect  PULMONARY: Non-labored respiration; no tachypnea  ABD: Soft, gravid, nontender.      ASSESSMENT AND PLAN    MACRINA BARBOZA Problems (from 20 to present)     Problem Noted Resolved    Birth Center Risk Assessment: 0- Meets birth center guidelines 2020 by Fozia Diamond CNM No    Overview Signed 2020  5:51 PM by Fozia Diamond CNM     Birth Center Risk Assessment: 0- Meets birth center guidelines    0- CNM management in ABC  1- CNM management on L&D  2- Consultation with OB to develop  plan of care  3- Collaborative CNM/OB management with delivery on L&D  4- Permanent referral of care to MD           Vulvar varices during pregnancy 2020 by Fozia Diamond CNM No    Pregnancy 2020 by Rajani Barker CNM No    Overview Addendum 2020  9:46 AM by Joseph Cortes CNM     Prepregnancy BMI: 23 (ABC max wt: 53lb)  Pap: ??  Dating - per LMP confirmed via 7 wk sonogram  U/S - see care everywhere  Aneuploidy screening - NIPT normal  Blood type:  A POS  GDM screen - Passed 1 hr  Vaccines - [ ]Flu [ Yes--]TDAP  GBS neg  [ ]Consents  Contraception -  Peds -   Circ - N/A                 GBS collected today   Reviewed [facility] recommendation for repeat HIV/RPR today; she is open but may wait for GBS results, orders are in.    Reviewed warning signs, normal FM,  labor precautions, and how/when to call.      Follow-up: 1 week, call or present sooner PRN  Rajani Barker CNM

## 2020-10-07 ENCOUNTER — ROUTINE PRENATAL (OUTPATIENT)
Dept: OBSTETRICS AND GYNECOLOGY | Facility: CLINIC | Age: 30
End: 2020-10-07
Payer: MEDICAID

## 2020-10-07 VITALS
WEIGHT: 163.13 LBS | SYSTOLIC BLOOD PRESSURE: 106 MMHG | BODY MASS INDEX: 29.84 KG/M2 | DIASTOLIC BLOOD PRESSURE: 70 MMHG

## 2020-10-07 DIAGNOSIS — Z13.9 RISK AND FUNCTIONAL ASSESSMENT: ICD-10-CM

## 2020-10-07 DIAGNOSIS — Z34.90 PREGNANCY, UNSPECIFIED GESTATIONAL AGE: Primary | ICD-10-CM

## 2020-10-07 PROCEDURE — 99212 PR OFFICE/OUTPT VISIT, EST, LEVL II, 10-19 MIN: ICD-10-PCS | Mod: TH,S$PBB,, | Performed by: ADVANCED PRACTICE MIDWIFE

## 2020-10-07 PROCEDURE — 99213 OFFICE O/P EST LOW 20 MIN: CPT | Mod: PBBFAC,TH | Performed by: ADVANCED PRACTICE MIDWIFE

## 2020-10-07 PROCEDURE — 99212 OFFICE O/P EST SF 10 MIN: CPT | Mod: TH,S$PBB,, | Performed by: ADVANCED PRACTICE MIDWIFE

## 2020-10-07 PROCEDURE — 99999 PR PBB SHADOW E&M-EST. PATIENT-LVL III: CPT | Mod: PBBFAC,,, | Performed by: ADVANCED PRACTICE MIDWIFE

## 2020-10-07 PROCEDURE — 99999 PR PBB SHADOW E&M-EST. PATIENT-LVL III: ICD-10-PCS | Mod: PBBFAC,,, | Performed by: ADVANCED PRACTICE MIDWIFE
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
## 2020-10-09 PROBLEM — G43.909 MIGRAINE HEADACHE: Status: RESOLVED | Noted: 2019-02-14 | Resolved: 2020-10-09

## 2020-10-12 ENCOUNTER — TELEPHONE (OUTPATIENT)
Dept: OBSTETRICS AND GYNECOLOGY | Facility: CLINIC | Age: 30
End: 2020-10-12

## 2020-10-12 ENCOUNTER — HOSPITAL ENCOUNTER (EMERGENCY)
Facility: OTHER | Age: 30
Discharge: HOME | End: 2020-10-12
Attending: OBSTETRICS & GYNECOLOGY
Payer: MEDICAID

## 2020-10-12 VITALS
SYSTOLIC BLOOD PRESSURE: 107 MMHG | DIASTOLIC BLOOD PRESSURE: 70 MMHG | HEART RATE: 97 BPM | OXYGEN SATURATION: 97 % | TEMPERATURE: 98 F | RESPIRATION RATE: 16 BRPM

## 2020-10-12 DIAGNOSIS — Z3A.40 40 WEEKS GESTATION OF PREGNANCY: ICD-10-CM

## 2020-10-12 DIAGNOSIS — O36.8130 DECREASED FETAL MOVEMENTS IN THIRD TRIMESTER, SINGLE OR UNSPECIFIED FETUS: Primary | ICD-10-CM

## 2020-10-12 DIAGNOSIS — R10.11 RIGHT UPPER QUADRANT PAIN: Primary | ICD-10-CM

## 2020-10-12 DIAGNOSIS — O48.0 POST-TERM PREGNANCY, 40-42 WEEKS OF GESTATION: Primary | ICD-10-CM

## 2020-10-12 DIAGNOSIS — R10.11 RIGHT UPPER QUADRANT PAIN: ICD-10-CM

## 2020-10-12 LAB
ABDOMINAL CIRCUMFERENCE: NORMAL
BIPARIETAL DIAMETER: NORMAL
CREAT UR-MCNC: 67.9 MG/DL (ref 15–325)
ESTIMATED FETAL WEIGHT: NORMAL
FEMUR LENGTH: NORMAL
HC/AC: NORMAL
HEAD CIRCUMFERENCE: NORMAL
PROT UR-MCNC: 11 MG/DL (ref 0–15)
PROT/CREAT UR: 0.16 MG/G{CREAT} (ref 0–0.2)

## 2020-10-12 PROCEDURE — 76815 OB US LIMITED FETUS(S): CPT | Mod: 26,,, | Performed by: OBSTETRICS & GYNECOLOGY

## 2020-10-12 PROCEDURE — 99283 PR EMERGENCY DEPT VISIT,LEVEL III: ICD-10-PCS | Mod: 25,,, | Performed by: OBSTETRICS & GYNECOLOGY

## 2020-10-12 PROCEDURE — 76815 OB US LIMITED FETUS(S): CPT | Performed by: OBSTETRICS & GYNECOLOGY

## 2020-10-12 PROCEDURE — 84156 ASSAY OF PROTEIN URINE: CPT

## 2020-10-12 PROCEDURE — 76815 PR  US,PREGNANT UTERUS,LIMITED, 1/> FETUSES: ICD-10-PCS | Mod: 26,,, | Performed by: OBSTETRICS & GYNECOLOGY

## 2020-10-12 PROCEDURE — 59025 PR FETAL 2N-STRESS TEST: ICD-10-PCS | Mod: 26,59,, | Performed by: OBSTETRICS & GYNECOLOGY

## 2020-10-12 PROCEDURE — 99284 EMERGENCY DEPT VISIT MOD MDM: CPT | Mod: 25

## 2020-10-12 PROCEDURE — 99283 EMERGENCY DEPT VISIT LOW MDM: CPT | Mod: 25,,, | Performed by: OBSTETRICS & GYNECOLOGY

## 2020-10-12 PROCEDURE — 59025 FETAL NON-STRESS TEST: CPT

## 2020-10-12 PROCEDURE — 59025 FETAL NON-STRESS TEST: CPT | Mod: 26,59,, | Performed by: OBSTETRICS & GYNECOLOGY

## 2020-10-12 NOTE — TELEPHONE ENCOUNTER
"Returned Catie's call. Catie states that for the "last few days" her baby's movement has slowed down. The movements are not as strong and not as often. She states that she has not been getting 8-10 movements in a 2 hour period. It is more like 2 small movements an hour with encouragement like touching her belly trying to get baby to move.    Recommended for Catie to come in to the [facility] for monitoring as soon as possible. She states that she lives an hour away and needs to drop her daughter off with a caregiver, but that she is on her way. I will notify [facility] that she is coming. Gave her directions to the [facility].  "

## 2020-10-12 NOTE — DISCHARGE INSTRUCTIONS
Labor and Childbirth: Active Labor  During active labor, your contractions will be stronger and more rhythmic than with early labor. They peak and subside like waves. They may happen 3 to 5 minutes apart and last about 45 to 60 seconds. This part of labor can be hard work. But it is often shorter than early labor. When you reach active labor, exams and tests will be done to see how you and your baby are doing.     Your cervix may dilate 4 to 8 centimeters during the first part of active labor.   Evaluating you and your baby  An exam tells how you and your baby are responding to contractions. Your blood pressure, temperature, and pulse will be checked. A blood or urine sample may also be taken. A fetal monitor will be used to check your babys heart rate. Sometimes an IV (intravenous) line is started to give you medicine and fluids.  Moving ahead with labor  You may now feel contractions in your whole stomach instead of just the lower part (like during early labor). If your amniotic sac has not broken already, it may break now. Or, it may be broken for you. To help your baby descend, change position often. Walking or sitting in a rocking chair or recliner may help. You may find it hard to relax even though you are tired. You may also be less interested in talking than you were earlier. If youre having anesthesia, you will get it now.   Special issues during labor  If labor doesnt progress well or a problem arises, you may need a . But your healthcare providers may take certain steps to help you avoid a :  · If your cervix isnt dilating, a medicine (oxytocin) may be used to augment labor.  · If fetal monitoring shows your baby isnt getting enough oxygen, shifting your body position may help. You may also be given oxygen through a mask.  · If you have preeclampsia (a condition that results in high blood pressure, swelling, and other symptoms), you may be given medicines by IV (intravenous). Your  healthcare provider may also tell you to lie on your left side.  Responding to contractions  During contractions, try to stay relaxed. Tense muscles use more oxygen, eat up your bodys energy, and increase pain. Use the breathing and relaxation techniques you may have learned. And let your support person know how he or she can help. If youve had problems during a previous birth, focus on the present. Keep in mind that no 2 births are the same.     Support persons note  Here's how you can help:  · Have the mother walk or change positions at least once an hour. This improves circulation and helps the baby descend.  · Keep reminding the mother to breathe and relax through each contraction.  · Reassure her. Try to keep her from getting anxious or overstressed.  · Take care of yourself. Take a short break to eat or go to the bathroom when you need to.  · Rest when the mother does. Youll both benefit.   Date Last Reviewed: 8/16/2015  © 8690-1208 [employer]. [address] [place] [address], [place] [address]. All rights reserved. This information is not intended as a substitute for professional medical care. Always follow your healthcare professional's instructions.

## 2020-10-12 NOTE — ED PROVIDER NOTES
"Encounter Date: 10/12/2020       History     Chief Complaint   Patient presents with    Decreased Fetal Movement     History of Present Illness:   Catie Pendleton is a 30 y.o. female  at 40w3d with Estimated Date of Delivery: 10/9/20 who presents to [facility] accompanied by her , Jose. Expecting a girl!    Catie states that for the "last few days" her baby's movement has slowed down. The movements are not as strong and not as often. She states that she has not been getting 8-10 movements in a 2 hour period. It is more like 2 small movements an hour with encouragement like touching her belly trying to get baby to move.      This pregnancy has been complicated by  Patient Active Problem List:     Uterine fibroid     Migraine without aura, not intractable, without status migrainosus     Pregnancy     Birth Center Risk Assessment: 0- Meets birth center guidelines     Vulvar varices during pregnancy       Presentation: Vertex by US per Dr. Mahmood  Estimated Fetal Weight: 7lb 0oz    Birth Center Risk Assessment: 0- Meets birth center guidelines    CNM management in ABC  CNM management on L&D  Consultation with OB to develop  plan of care  Collaborative CNM/OB management with delivery on L&D   4- Permanent referral of care to MD          Review of patient's allergies indicates:   Allergen Reactions    Clindamycin Itching and Rash    Morphine Rash    Penicillins Hives and Rash     She was a baby and was told that she was allergic.    Sulfa (sulfonamide antibiotics) Rash     Past Medical History:   Diagnosis Date    Eosinophilic fasciitis     When she was 11 for about 2 years. 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    Migraine      Past Surgical History:   Procedure Laterality Date    OVARIAN CYST REMOVAL Right      Family History   Problem Relation Age of Onset    Colon polyps Mother         Precancerous - Were removed    Hypertension Mother     Hyperlipidemia Mother     Hypertension Father     " Hyperlipidemia Father     Diabetes Father     Melanoma Father     Lung cancer Maternal Grandfather     Diabetes Paternal Grandmother     Emphysema Paternal Grandfather         Was a smoker    Skin cancer Paternal Grandfather         Not sure which kind of skin cancer    Ovarian cancer Neg Hx     Breast cancer Neg Hx      Social History     Tobacco Use    Smoking status: Never Smoker    Smokeless tobacco: Never Used   Substance Use Topics    Alcohol use: Not Currently    Drug use: Never     Review of Systems   Constitutional: Negative for activity change, fatigue and fever.   HENT: Negative for sneezing and trouble swallowing.    Eyes: Negative for pain, discharge, redness, itching and visual disturbance.   Respiratory: Negative for cough and shortness of breath.    Cardiovascular: Negative for chest pain and leg swelling.   Gastrointestinal: Positive for abdominal pain (Some pain underneath her ribs on the right when riding in the car for a long period of time (it takes her about an hour to ride to the hospital)). Negative for diarrhea, nausea and vomiting.   Endocrine: Negative for cold intolerance and heat intolerance.   Genitourinary: Negative for difficulty urinating, dysuria, flank pain, vaginal bleeding, vaginal discharge and vaginal pain.   Musculoskeletal: Positive for back pain (Some pain underneath her right scapula when she is riding in the car for a long period of time (it takes her about an hour to ride to the hospital)).   Skin: Negative for rash.   Allergic/Immunologic: Negative for environmental allergies.   Neurological: Negative for facial asymmetry, speech difficulty, light-headedness, numbness and headaches.   Hematological: Does not bruise/bleed easily.   Psychiatric/Behavioral: Negative for behavioral problems, confusion, decreased concentration and dysphoric mood.       Physical Exam     Initial Vitals [10/12/20 1400]   BP Pulse Resp Temp SpO2   116/71 106 16 98.1 °F (36.7 °C) 97 %       MAP       --         Physical Exam    Constitutional: She appears well-developed and well-nourished. She is not diaphoretic. No distress.   HENT:   Head: Normocephalic.   Eyes: Conjunctivae and EOM are normal. Pupils are equal, round, and reactive to light. Right eye exhibits no discharge. Left eye exhibits no discharge.   Neck: Normal range of motion. Neck supple.   Cardiovascular: Normal rate.   Pulmonary/Chest: No respiratory distress.   Abdominal: Soft. She exhibits no distension. There is no abdominal tenderness. There is no guarding.   Genitourinary:    No vaginal discharge.     Musculoskeletal: Normal range of motion. No tenderness or edema.   Neurological: She is alert and oriented to person, place, and time. She has normal strength and normal reflexes.   Skin: Skin is warm and dry.   Psychiatric: She has a normal mood and affect. Her behavior is normal. Judgment and thought content normal.         ED Course   Obtain Fetal nonstress test (NST)    Date/Time: 10/12/2020 3:02 PM  Performed by: Fozia Diamond CNM  Authorized by: Fozia Diamond CNM     Nonstress Test:     Variability:  6-25 BPM    Decelerations:  Variable    Accelerations:  15 bpm    Acoustic Stimulator: No      Baseline:  150    Uterine Irritability: No      Contractions:  Not present    Contraction Frequency:  None  Biophysical Profile:     Nonstress Test Interpretation: reactive      Overall Impression:  Reassuring  Post-procedure:     Patient tolerance:  Patient tolerated the procedure well with no immediate complications      Labs Reviewed   PROTEIN / CREATININE RATIO, URINE    Narrative:     Specimen Source->Urine          Imaging Results    None          Medical Decision Making:   ED Management:   at 40w3d with decreased fetal movement over the past two days  She reports that baby seems to be moving more now after the one hour drive from her house to the hospital  NST reactive  RACHEL WNL per Dr. Mahmood after bedside  ultrasound  Reviewed fetal kick counts  Vital signs stable  Will run protein creatine ratio d/t patient report of RUQ pain and pain below right scapula when riding in the car for an hour  Patient desires to have CBC and CMP collected in the outpatient lab on her way out  Patient denies nausea, denies headache, denies vision changes, no edema.  Okay for discharge home. Patient agrees to follow up at clinic appointment on Wednesday or sooner PRN.  Patient desires IOL at 41w0d.              Attending Attestation:   Physician Attestation Statement for Resident:  As the supervising MD   Physician Attestation Statement: I have personally seen and examined this patient.   I agree with the above history. -:   As the supervising MD I agree with the above PE.    As the supervising MD I agree with the above treatment, course, plan, and disposition.   -:   NST  I independently reviewed the fetal non-stress test with the following interpretation:  150 BPM baseline  Variability: moderate  Accelerations: 2x mild variable decelerations, otherwise reactive and reassuring  Decelerations: absent  Contractions: none  Category 1    Clinical Interpretation:reactive  Bedside US: vertex, fundal placenta, MVP 4.24    Patient evaluated and found to be stable, agree with CNM's assessment of decreased fetal movement now resolved and plan to discharge to home with precautions re movement counts.  I was personally present during the critical portions of the procedure(s) performed by the resident and was immediately available in the ED to provide services and assistance as needed during the entire procedure.  I have reviewed the following: old records at this facility.                              Clinical Impression:     ICD-10-CM ICD-9-CM   1. Decreased fetal movements in third trimester, single or unspecified fetus  O36.8130 655.73   2. Right upper quadrant pain  R10.11 789.01   3. 40 weeks gestation of pregnancy  Z3A.40 V22.2                           ED Disposition Condition    Discharge Good        ED Prescriptions     None        Follow-up Information    None                                      Fozia Diamond CNM  10/12/20 1514       Leda Mahmood MD  10/12/20 181

## 2020-10-14 ENCOUNTER — ROUTINE PRENATAL (OUTPATIENT)
Dept: OBSTETRICS AND GYNECOLOGY | Facility: CLINIC | Age: 30
End: 2020-10-14
Payer: MEDICAID

## 2020-10-14 VITALS
DIASTOLIC BLOOD PRESSURE: 70 MMHG | BODY MASS INDEX: 30.02 KG/M2 | SYSTOLIC BLOOD PRESSURE: 104 MMHG | WEIGHT: 164.13 LBS

## 2020-10-14 DIAGNOSIS — Z34.90 PREGNANCY, UNSPECIFIED GESTATIONAL AGE: ICD-10-CM

## 2020-10-14 DIAGNOSIS — Z13.9 RISK AND FUNCTIONAL ASSESSMENT: Primary | ICD-10-CM

## 2020-10-14 PROCEDURE — 99999 PR PBB SHADOW E&M-EST. PATIENT-LVL III: ICD-10-PCS | Mod: PBBFAC,,, | Performed by: ADVANCED PRACTICE MIDWIFE

## 2020-10-14 PROCEDURE — 99999 PR PBB SHADOW E&M-EST. PATIENT-LVL III: CPT | Mod: PBBFAC,,, | Performed by: ADVANCED PRACTICE MIDWIFE

## 2020-10-14 PROCEDURE — 99212 OFFICE O/P EST SF 10 MIN: CPT | Mod: TH,S$PBB,, | Performed by: ADVANCED PRACTICE MIDWIFE

## 2020-10-14 PROCEDURE — 99212 PR OFFICE/OUTPT VISIT, EST, LEVL II, 10-19 MIN: ICD-10-PCS | Mod: TH,S$PBB,, | Performed by: ADVANCED PRACTICE MIDWIFE

## 2020-10-14 PROCEDURE — 99213 OFFICE O/P EST LOW 20 MIN: CPT | Mod: PBBFAC,TH | Performed by: ADVANCED PRACTICE MIDWIFE

## 2020-10-14 NOTE — PROGRESS NOTES
Chief Complaint   Patient presents with    Routine Prenatal Visit       30 y.o. female  at 40w4d, by Estimated Date of Delivery: 10/9/20    Doing well today.    Reviewed TW lbs    BMI  -- Discussed IOM recommended weight gain of:    Normal Weight 18.5-24.9  25-35   -- Discussed criteria for delivery at [facility] r/t excessive pre-preg weight or excessive weight gain:   Pre-pregnancy BMI over 40 or excess pregnancy weight gain defined as:   Pre-preg BMI 18.5-24.9;  Excess weight gain = > 53 pounds      ROS  OBSTETRICS:   Contractions: Nothing regular   Bleeding No   Loss of fluid No   Fetal mvmnt Yes  GASTRO:   Nausea No   Vomiting No      OB History    Para Term  AB Living   3 1 1   1 1   SAB TAB Ectopic Multiple Live Births   1       1      # Outcome Date GA Lbr Tavares/2nd Weight Sex Delivery Anes PTL Lv   3 Current            2 Term  41w0d  3.969 kg (8 lb 12 oz)  Vag-Spont   MATHEUS   1 SAB                Dating reviewed  Allergies and problem list reviewed and updated  Medical and surgical history reviewed  Prenatal labs reviewed and updated    PHYSICAL EXAM  /70   Wt 74.5 kg (164 lb 2.1 oz)   LMP 2020   BMI 30.02 kg/m²     GENERAL: No acute distress  HEENT: Normocephalic, atraumatic  NEURO: Alert and oriented x3  PSYCH: Normal mood and affect  PULMONARY: Non-labored respiration; no tachypnea  ABD: Soft, gravid, nontender.      ASSESSMENT AND PLAN    MACRINA BARBOZA Problems (from 20 to present)     Problem Noted Resolved    Birth Center Risk Assessment: 0- Meets birth center guidelines 2020 by Fozia Diamond CNM No    Overview Signed 2020  5:51 PM by Fozia Diamond CNM     Birth Center Risk Assessment: 0- Meets birth center guidelines    0- CNM management in ABC  1- CNM management on L&D  2- Consultation with OB to develop  plan of care  3- Collaborative CNM/OB management with delivery on L&D  4- Permanent referral of care to MD           Vulvar varices  during pregnancy 8/24/2020 by Fozia Diamond CNM No    Pregnancy 7/5/2020 by Rajani Barker CNM No    Overview Addendum 10/9/2020  6:55 PM by Fozia Diamond CNM     Prepregnancy BMI: 23 (ABC max wt: 53lb)  Pap: 4/2019 Neg  Dating - per LMP confirmed via 7 wk sonogram - record in Care Everywhere  U/S - Normal anatomy - see record in Care Everywhere  Aneuploidy screening - NIPT normal  Blood type: A POS  GDM screen - Passed 1 hr  Vaccines - [ ]Flu [ ]TDAP  GBS neg  [ ]Consents  Contraception -  Peds -   Circ - N/A               Delivery consents signed    Scheduled IOL at 41w0d on 10/16/20 per patient request.    Epic staff message sent to CNMs regarding scheduling of IOL.    Reviewed warning signs, normal FM, and how/when to call.      Follow-up: IOL on 10/16/20, call or present sooner PRN

## 2020-10-16 ENCOUNTER — PATIENT MESSAGE (OUTPATIENT)
Dept: OBSTETRICS AND GYNECOLOGY | Facility: CLINIC | Age: 30
End: 2020-10-16

## 2020-10-16 ENCOUNTER — ANESTHESIA (OUTPATIENT)
Dept: OBSTETRICS AND GYNECOLOGY | Facility: OTHER | Age: 30
End: 2020-10-16
Payer: MEDICAID

## 2020-10-16 ENCOUNTER — HOSPITAL ENCOUNTER (INPATIENT)
Facility: OTHER | Age: 30
LOS: 3 days | Discharge: HOME | End: 2020-10-19
Attending: OBSTETRICS & GYNECOLOGY | Admitting: OBSTETRICS & GYNECOLOGY
Payer: MEDICAID

## 2020-10-16 ENCOUNTER — ANESTHESIA EVENT (OUTPATIENT)
Dept: OBSTETRICS AND GYNECOLOGY | Facility: OTHER | Age: 30
End: 2020-10-16
Payer: MEDICAID

## 2020-10-16 DIAGNOSIS — O48.0 POST-TERM PREGNANCY, 40-42 WEEKS OF GESTATION: ICD-10-CM

## 2020-10-16 DIAGNOSIS — Z34.90 ENCOUNTER FOR ELECTIVE INDUCTION OF LABOR: ICD-10-CM

## 2020-10-16 LAB
ABO + RH BLD: NORMAL
BASOPHILS # BLD AUTO: 0.03 K/UL (ref 0–0.2)
BASOPHILS NFR BLD: 0.3 % (ref 0–1.9)
BLD GP AB SCN CELLS X3 SERPL QL: NORMAL
DIFFERENTIAL METHOD: ABNORMAL
EOSINOPHIL # BLD AUTO: 0.1 K/UL (ref 0–0.5)
EOSINOPHIL NFR BLD: 0.7 % (ref 0–8)
ERYTHROCYTE [DISTWIDTH] IN BLOOD BY AUTOMATED COUNT: 14.4 % (ref 11.5–14.5)
HCT VFR BLD AUTO: 37.8 % (ref 37–48.5)
HGB BLD-MCNC: 12.3 G/DL (ref 12–16)
IMM GRANULOCYTES # BLD AUTO: 0.13 K/UL (ref 0–0.04)
IMM GRANULOCYTES NFR BLD AUTO: 1.1 % (ref 0–0.5)
LYMPHOCYTES # BLD AUTO: 1.2 K/UL (ref 1–4.8)
LYMPHOCYTES NFR BLD: 9.8 % (ref 18–48)
MCH RBC QN AUTO: 28.6 PG (ref 27–31)
MCHC RBC AUTO-ENTMCNC: 32.5 G/DL (ref 32–36)
MCV RBC AUTO: 88 FL (ref 82–98)
MONOCYTES # BLD AUTO: 0.8 K/UL (ref 0.3–1)
MONOCYTES NFR BLD: 6.3 % (ref 4–15)
NEUTROPHILS # BLD AUTO: 9.7 K/UL (ref 1.8–7.7)
NEUTROPHILS NFR BLD: 81.8 % (ref 38–73)
NRBC BLD-RTO: 0 /100 WBC
PLATELET # BLD AUTO: 178 K/UL (ref 150–350)
PMV BLD AUTO: 11.4 FL (ref 9.2–12.9)
RBC # BLD AUTO: 4.3 M/UL (ref 4–5.4)
SARS-COV-2 RDRP RESP QL NAA+PROBE: NEGATIVE
WBC # BLD AUTO: 11.87 K/UL (ref 3.9–12.7)

## 2020-10-16 PROCEDURE — 59410 PRA OBSTE CARE,VAG DELIV+POSTPARTUM: ICD-10-PCS | Mod: AA,,, | Performed by: ANESTHESIOLOGY

## 2020-10-16 PROCEDURE — 63600175 PHARM REV CODE 636 W HCPCS: Performed by: ADVANCED PRACTICE MIDWIFE

## 2020-10-16 PROCEDURE — 62326 NJX INTERLAMINAR LMBR/SAC: CPT | Performed by: ANESTHESIOLOGY

## 2020-10-16 PROCEDURE — 72100002 HC LABOR CARE, 1ST 8 HOURS

## 2020-10-16 PROCEDURE — 85025 COMPLETE CBC W/AUTO DIFF WBC: CPT

## 2020-10-16 PROCEDURE — 59410 OBSTETRICAL CARE: CPT | Mod: AA,,, | Performed by: ANESTHESIOLOGY

## 2020-10-16 PROCEDURE — 27200710 HC EPIDURAL INFUSION PUMP SET: Performed by: ANESTHESIOLOGY

## 2020-10-16 PROCEDURE — 86850 RBC ANTIBODY SCREEN: CPT

## 2020-10-16 PROCEDURE — 25000003 PHARM REV CODE 250: Performed by: ANESTHESIOLOGY

## 2020-10-16 PROCEDURE — U0002 COVID-19 LAB TEST NON-CDC: HCPCS

## 2020-10-16 PROCEDURE — 11000001 HC ACUTE MED/SURG PRIVATE ROOM

## 2020-10-16 PROCEDURE — C1751 CATH, INF, PER/CENT/MIDLINE: HCPCS | Performed by: ANESTHESIOLOGY

## 2020-10-16 RX ORDER — ONDANSETRON 8 MG/1
8 TABLET, ORALLY DISINTEGRATING ORAL EVERY 8 HOURS PRN
Status: DISCONTINUED | OUTPATIENT
Start: 2020-10-16 | End: 2020-10-17

## 2020-10-16 RX ORDER — FENTANYL/BUPIVACAINE/NS/PF 2MCG/ML-.1
PLASTIC BAG, INJECTION (ML) INJECTION
Status: COMPLETED
Start: 2020-10-16 | End: 2020-10-16

## 2020-10-16 RX ORDER — OXYTOCIN/RINGER'S LACTATE 30/500 ML
334 PLASTIC BAG, INJECTION (ML) INTRAVENOUS ONCE
Status: DISCONTINUED | OUTPATIENT
Start: 2020-10-16 | End: 2020-10-17

## 2020-10-16 RX ORDER — OXYTOCIN/RINGER'S LACTATE 30/500 ML
2 PLASTIC BAG, INJECTION (ML) INTRAVENOUS CONTINUOUS
Status: DISCONTINUED | OUTPATIENT
Start: 2020-10-16 | End: 2020-10-17

## 2020-10-16 RX ORDER — ACETAMINOPHEN 325 MG/1
650 TABLET ORAL EVERY 6 HOURS PRN
Status: DISCONTINUED | OUTPATIENT
Start: 2020-10-16 | End: 2020-10-17

## 2020-10-16 RX ORDER — CALCIUM CARBONATE 200(500)MG
500 TABLET,CHEWABLE ORAL 3 TIMES DAILY PRN
Status: DISCONTINUED | OUTPATIENT
Start: 2020-10-16 | End: 2020-10-17

## 2020-10-16 RX ORDER — OXYTOCIN/RINGER'S LACTATE 30/500 ML
95 PLASTIC BAG, INJECTION (ML) INTRAVENOUS ONCE
Status: DISCONTINUED | OUTPATIENT
Start: 2020-10-16 | End: 2020-10-17

## 2020-10-16 RX ORDER — SODIUM CHLORIDE 9 MG/ML
INJECTION, SOLUTION INTRAVENOUS
Status: DISCONTINUED | OUTPATIENT
Start: 2020-10-16 | End: 2020-10-17

## 2020-10-16 RX ORDER — SODIUM CHLORIDE, SODIUM LACTATE, POTASSIUM CHLORIDE, CALCIUM CHLORIDE 600; 310; 30; 20 MG/100ML; MG/100ML; MG/100ML; MG/100ML
INJECTION, SOLUTION INTRAVENOUS CONTINUOUS
Status: DISCONTINUED | OUTPATIENT
Start: 2020-10-16 | End: 2020-10-17

## 2020-10-16 RX ORDER — SIMETHICONE 80 MG
1 TABLET,CHEWABLE ORAL 4 TIMES DAILY PRN
Status: DISCONTINUED | OUTPATIENT
Start: 2020-10-16 | End: 2020-10-17

## 2020-10-16 RX ORDER — BUPIVACAINE HYDROCHLORIDE 2.5 MG/ML
INJECTION, SOLUTION EPIDURAL; INFILTRATION; INTRACAUDAL
Status: DISPENSED
Start: 2020-10-16 | End: 2020-10-17

## 2020-10-16 RX ORDER — FENTANYL/BUPIVACAINE/NS/PF 2MCG/ML-.1
PLASTIC BAG, INJECTION (ML) INJECTION CONTINUOUS PRN
Status: DISCONTINUED | OUTPATIENT
Start: 2020-10-16 | End: 2020-10-17

## 2020-10-16 RX ADMIN — SODIUM CHLORIDE, SODIUM LACTATE, POTASSIUM CHLORIDE, AND CALCIUM CHLORIDE: .6; .31; .03; .02 INJECTION, SOLUTION INTRAVENOUS at 02:10

## 2020-10-16 RX ADMIN — Medication 2 ML: at 11:10

## 2020-10-16 RX ADMIN — SODIUM CHLORIDE, SODIUM LACTATE, POTASSIUM CHLORIDE, AND CALCIUM CHLORIDE 1000 ML: .6; .31; .03; .02 INJECTION, SOLUTION INTRAVENOUS at 11:10

## 2020-10-16 RX ADMIN — Medication 2 MILLI-UNITS/MIN: at 02:10

## 2020-10-16 RX ADMIN — Medication 10 ML/HR: at 11:10

## 2020-10-16 RX ADMIN — Medication 5 ML: at 11:10

## 2020-10-16 NOTE — SUBJECTIVE & OBJECTIVE
Obstetric HPI:  Patient reports None contractions, active fetal movement, No vaginal bleeding , No loss of fluid     This pregnancy has been complicated by   Patient Active Problem List   Diagnosis    Uterine fibroid    Migraine without aura, not intractable, without status migrainosus    Pregnancy    Birth Center Risk Assessment: 0- Meets birth center guidelines    Vulvar varices during pregnancy    Encounter for elective induction of labor         OB History    Para Term  AB Living   3 1 1 0 1 1   SAB TAB Ectopic Multiple Live Births   1 0 0 0 1      # Outcome Date GA Lbr Tavares/2nd Weight Sex Delivery Anes PTL Lv   3 Current            2 Term  41w0d  3.969 kg (8 lb 12 oz)  Vag-Spont   MATHEUS   1 SAB              Past Medical History:   Diagnosis Date    Eosinophilic fasciitis     When she was 11 for about 2 years. 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    Migraine      Past Surgical History:   Procedure Laterality Date    OVARIAN CYST REMOVAL Right        PTA Medications   Medication Sig    prenatal vit/iron fum/folic ac (PRENATAL 1+1 ORAL)        Review of patient's allergies indicates:   Allergen Reactions    Clindamycin Itching and Rash    Morphine Rash    Penicillins Hives and Rash     She was a baby and was told that she was allergic.    Sulfa (sulfonamide antibiotics) Rash        Family History     Problem Relation (Age of Onset)    Colon polyps Mother    Diabetes Father, Paternal Grandmother    Emphysema Paternal Grandfather    Hyperlipidemia Mother, Father    Hypertension Mother, Father    Lung cancer Maternal Grandfather    Melanoma Father    Skin cancer Paternal Grandfather        Tobacco Use    Smoking status: Never Smoker    Smokeless tobacco: Never Used   Substance and Sexual Activity    Alcohol use: Not Currently    Drug use: Never    Sexual activity: Yes     Partners: Male     Birth control/protection: None     Comment: Duy     Review of Systems   Constitutional: Negative  for activity change and fever.   HENT: Negative.    Eyes: Negative for visual disturbance.   Respiratory: Negative for shortness of breath.    Cardiovascular: Negative for chest pain and palpitations.   Gastrointestinal: Negative for abdominal pain, diarrhea, nausea and vomiting.   Endocrine: Negative for diabetes and hypothyroidism.   Genitourinary: Negative for genital sores, pelvic pain, vaginal bleeding and vaginal discharge.   Musculoskeletal: Negative for back pain.   Integumentary:  Negative for rash, acne, hair changes and breast tenderness.   Neurological: Negative for syncope and headaches.   Hematological: Negative.    Psychiatric/Behavioral: Negative for depression. The patient is not nervous/anxious.    All other systems reviewed and are negative.  Breast: negative.  Negative for tenderness     Objective:     Vital Signs (Most Recent):    Vital Signs (24h Range):            .  There is no height or weight on file to calculate BMI.  LMP 01/03/2020   BP: 121/80,   FHT: 140, positive accels, no decels, moderate variability. Cat 1 (reassuring)  TOCO:  intermittent/ not regular upon admission, patient not feeling them.  Physical Exam:   Constitutional: She is oriented to person, place, and time. She appears well-developed and well-nourished.    HENT:   Head: Normocephalic and atraumatic.    Eyes: Conjunctivae are normal.    Neck: Normal range of motion. Neck supple.    Cardiovascular: Normal rate, regular rhythm and normal heart sounds.     Pulmonary/Chest: Effort normal and breath sounds normal.        Abdominal: Soft.   Gravid, occasional contractions per toco.     Genitourinary:    Vagina and uterus normal.      Genitourinary Comments: SVE 3/50/-3/soft/posterior, vertex               Musculoskeletal: Normal range of motion and moves all extremeties.       Neurological: She is alert and oriented to person, place, and time.    Skin: Skin is warm and dry.    Psychiatric: She has a normal mood and  affect. Her behavior is normal. Judgment and thought content normal.       Cervix:  Dilation:  3  Effacement:  50%  Station: -3  Presentation: Vertex     Significant Labs:  Lab Results   Component Value Date    GROUPTRH A POS 09/16/2020    STREPBCULT No Group B Streptococcus isolated 09/16/2020       CBC:   Recent Labs   Lab 10/16/20  1314   WBC 11.87   RBC 4.30   HGB 12.3   HCT 37.8      MCV 88   MCH 28.6   MCHC 32.5     I have personallly reviewed all pertinent lab results from the last 24 hours.

## 2020-10-16 NOTE — PLAN OF CARE
10/16/20 1649   OB SCREEN   Source of Information health record   Received Prenatal Care Yes   Any indications/suspicions for None   Is this a teen pregnancy No   Indication for adoption/Safe Haven No   Indication for DME/post-acute needs No   HIV (+) No   Any congenital  disorders No   Fetal demise/ death No       This patient has been screened for Social Work discharge planning needs. Based on  documentation in medical record , no discharge planning needs are anticipated at this time. Should any discharge planning needs arise, please consult . For urgent needs/consults, contact the  listed below at the number provided.      Leda Hooker [facility]  NICU   Ext. [phone]-phone  [email or web address]

## 2020-10-16 NOTE — HOSPITAL COURSE
1230: Admission/ covid testing negative  1330: SVE 3/50/-3/ posterior/ soft, vertex presentation, bow intact  1345: Pitocin Started  2344: Epidural placement  10/17/20  0045: AROM-clear fluid  10/17/20 -    10/19/20 - Normal involution, nursing with some difficulty (baby has a developmental issue with sucking but has been nursing some and supplementing with formula also - lactation involved as well as peds and speech therapy). Pt states she is ready for discharge home pending baby's bilirubin level.

## 2020-10-16 NOTE — ASSESSMENT & PLAN NOTE
Pitocin IOL planned. COvid testing and pit/ admissino orders placed.  Rajani Barker CNM, MSN  10/16/2020  11:37 AM

## 2020-10-16 NOTE — H&P
[facility]  Obstetrics  History & Physical    Patient Name: Catie Pendleton  MRN: [id number]  Admission Date: 10/16/2020  Primary Care Provider: Primary Doctor No    Subjective:     Principal Problem:Encounter for elective induction of labor    History of Present Illness:  Catie Pendleton is a 30 y.o. female  at 41w0d with Estimated Date of Delivery: 10/9/20 based on LMP confirmed by 7 week sonogram who presents to L&D for scheduled elective induction. She reports + FM. Denies VB, LOF or regular UCs. Accompanied by  BEBA to L&D. Expecting a girl!         Pregnancy has been c/b:    Patient Active Problem List   Diagnosis    Uterine fibroid    Migraine without aura, not intractable, without status migrainosus    Pregnancy    Birth Center Risk Assessment: 0- Meets birth center guidelines    Vulvar varices during pregnancy    Encounter for elective induction of labor       Obstetric HPI:  Patient reports None contractions, active fetal movement, No vaginal bleeding , No loss of fluid     This pregnancy has been complicated by   Patient Active Problem List   Diagnosis    Uterine fibroid    Migraine without aura, not intractable, without status migrainosus    Pregnancy    Birth Center Risk Assessment: 0- Meets birth center guidelines    Vulvar varices during pregnancy    Encounter for elective induction of labor         OB History    Para Term  AB Living   3 1 1 0 1 1   SAB TAB Ectopic Multiple Live Births   1 0 0 0 1      # Outcome Date GA Lbr Tavares/2nd Weight Sex Delivery Anes PTL Lv   3 Current            2 Term  41w0d  3.969 kg (8 lb 12 oz)  Vag-Spont   MATHEUS   1 SAB              Past Medical History:   Diagnosis Date    Eosinophilic fasciitis     When she was 11 for about 2 years. 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    Migraine      Past Surgical History:   Procedure Laterality Date    OVARIAN CYST REMOVAL Right        PTA Medications   Medication Sig     prenatal vit/iron fum/folic ac (PRENATAL 1+1 ORAL)        Review of patient's allergies indicates:   Allergen Reactions    Clindamycin Itching and Rash    Morphine Rash    Penicillins Hives and Rash     She was a baby and was told that she was allergic.    Sulfa (sulfonamide antibiotics) Rash        Family History     Problem Relation (Age of Onset)    Colon polyps Mother    Diabetes Father, Paternal Grandmother    Emphysema Paternal Grandfather    Hyperlipidemia Mother, Father    Hypertension Mother, Father    Lung cancer Maternal Grandfather    Melanoma Father    Skin cancer Paternal Grandfather        Tobacco Use    Smoking status: Never Smoker    Smokeless tobacco: Never Used   Substance and Sexual Activity    Alcohol use: Not Currently    Drug use: Never    Sexual activity: Yes     Partners: Male     Birth control/protection: None     Comment: Duy     Review of Systems   Constitutional: Negative for activity change and fever.   HENT: Negative.    Eyes: Negative for visual disturbance.   Respiratory: Negative for shortness of breath.    Cardiovascular: Negative for chest pain and palpitations.   Gastrointestinal: Negative for abdominal pain, diarrhea, nausea and vomiting.   Endocrine: Negative for diabetes and hypothyroidism.   Genitourinary: Negative for genital sores, pelvic pain, vaginal bleeding and vaginal discharge.   Musculoskeletal: Negative for back pain.   Integumentary:  Negative for rash, acne, hair changes and breast tenderness.   Neurological: Negative for syncope and headaches.   Hematological: Negative.    Psychiatric/Behavioral: Negative for depression. The patient is not nervous/anxious.    All other systems reviewed and are negative.  Breast: negative.  Negative for tenderness     Objective:     Vital Signs (Most Recent):    Vital Signs (24h Range):            .  There is no height or weight on file to calculate BMI.  LMP 01/03/2020   BP: 121/80,   FHT: 140, positive accels,  no decels, moderate variability. Cat 1 (reassuring)  TOCO:  intermittent/ not regular upon admission, patient not feeling them.  Physical Exam:   Constitutional: She is oriented to person, place, and time. She appears well-developed and well-nourished.    HENT:   Head: Normocephalic and atraumatic.    Eyes: Conjunctivae are normal.    Neck: Normal range of motion. Neck supple.    Cardiovascular: Normal rate, regular rhythm and normal heart sounds.     Pulmonary/Chest: Effort normal and breath sounds normal.        Abdominal: Soft.   Gravid, occasional contractions per toco.     Genitourinary:    Vagina and uterus normal.      Genitourinary Comments: SVE 3/50/-3/soft/posterior, vertex               Musculoskeletal: Normal range of motion and moves all extremeties.       Neurological: She is alert and oriented to person, place, and time.    Skin: Skin is warm and dry.    Psychiatric: She has a normal mood and affect. Her behavior is normal. Judgment and thought content normal.       Cervix:  Dilation:  3  Effacement:  50%  Station: -3  Presentation: Vertex     Significant Labs:  Lab Results   Component Value Date    GROUPTRH A POS 2020    STREPBCULT No Group B Streptococcus isolated 2020       CBC:   Recent Labs   Lab 10/16/20  1314   WBC 11.87   RBC 4.30   HGB 12.3   HCT 37.8      MCV 88   MCH 28.6   MCHC 32.5     I have personallly reviewed all pertinent lab results from the last 24 hours.    Assessment/Plan:     30 y.o. female  at 41w0d for:    * Encounter for elective induction of labor  Pitocin induction, consider eventual AROM, patient likely to desire epidural.  Rajani Barker CNM, MSN  10/16/2020  1:46 PM        Birth Center Risk Assessment: 1-management on labor and Delivery secondary to induction. MD Team updated and aware of patient status.    0- CNM management in ABC  1- CNM management on L&D  2- Consultation with OB to develop  plan of care  3- Collaborative CNM/OB management  with delivery on L&D  4- Permanent referral of care to MD Rajani Barker CNM  Obstetrics  [facility]

## 2020-10-16 NOTE — HPI
Catie Pendleton is a 30 y.o. female  at 41w0d with Estimated Date of Delivery: 10/9/20 based on LMP confirmed by 7 week sonogram who presents to L&D for scheduled elective induction. She reports + FM. Denies VB, LOF or regular UCs. Accompanied by  BEBA to L&D. Expecting a girl!         Pregnancy has been c/b:    Patient Active Problem List   Diagnosis    Uterine fibroid    Migraine without aura, not intractable, without status migrainosus    Pregnancy    Birth Center Risk Assessment: 0- Meets birth center guidelines    Vulvar varices during pregnancy    Encounter for elective induction of labor

## 2020-10-16 NOTE — ASSESSMENT & PLAN NOTE
Pitocin induction, consider eventual AROM, patient likely to desire epidural.  Rajani Barker CNM, MSN  10/16/2020  1:46 PM

## 2020-10-16 NOTE — ANESTHESIA PREPROCEDURE EVALUATION
[facility]  Anesthesia Pre-Operative Evaluation         Patient Name: Catie Pendleton  YOB: 1990  MRN: [id number]    10/16/2020      Catie Pendleton is a 30 y.o. female  @ 41w0d with no significant PMHx who presents for IOL. Pregnancy complicated by uterine fibroids. Delivered vaginally without epidural for first baby. Open to an epidural for this labor.    OB History    Para Term  AB Living   3 1 1   1 1   SAB TAB Ectopic Multiple Live Births   1       1      # Outcome Date GA Lbr Tavares/2nd Weight Sex Delivery Anes PTL Lv   3 Current            2 Term  41w0d  3.969 kg (8 lb 12 oz)  Vag-Spont   MATHEUS   1 SAB                Review of patient's allergies indicates:   Allergen Reactions    Clindamycin Itching and Rash    Morphine Rash    Penicillins Hives and Rash     She was a baby and was told that she was allergic.    Sulfa (sulfonamide antibiotics) Rash       Wt Readings from Last 1 Encounters:   10/14/20 1134 74.5 kg (164 lb 2.1 oz)       BP Readings from Last 3 Encounters:   10/16/20 121/80   10/14/20 104/70   10/12/20 107/70       Patient Active Problem List   Diagnosis    Uterine fibroid    Migraine without aura, not intractable, without status migrainosus    Pregnancy    Birth Center Risk Assessment: 0- Meets birth center guidelines    Vulvar varices during pregnancy    Encounter for elective induction of labor       Past Surgical History:   Procedure Laterality Date    OVARIAN CYST REMOVAL Right        Social History     Socioeconomic History    Marital status:      Spouse name: Duy    Number of children: Not on file    Years of education: Not on file    Highest education level: Not on file   Occupational History    Not on file   Social Needs    Financial resource strain: Not on file    Food insecurity     Worry: Not on file     Inability: Not on file    Transportation needs     Medical: Not on file     Non-medical: Not on  file   Tobacco Use    Smoking status: Never Smoker    Smokeless tobacco: Never Used   Substance and Sexual Activity    Alcohol use: Not Currently    Drug use: Never    Sexual activity: Yes     Partners: Male     Birth control/protection: None     Comment: Duy   Lifestyle    Physical activity     Days per week: Not on file     Minutes per session: Not on file    Stress: Not on file   Relationships    Social connections     Talks on phone: Not on file     Gets together: Not on file     Attends [employer] service: Not on file     Active member of club or organization: Not on file     Attends meetings of clubs or organizations: Not on file     Relationship status: Not on file   Other Topics Concern    Not on file   Social History Narrative    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        Duy works at [employer].        This will be Catie and Duy's second baby together!        No cats at home.         Chemistry        Component Value Date/Time     10/12/2020 1501    K 3.8 10/12/2020 1501     10/12/2020 1501    CO2 19 (L) 10/12/2020 1501    BUN 9 10/12/2020 1501    CREATININE 0.6 10/12/2020 1501    GLU 79 10/12/2020 1501        Component Value Date/Time    CALCIUM 8.3 (L) 10/12/2020 1501    ALKPHOS 146 (H) 10/12/2020 1501    AST 18 10/12/2020 1501    ALT 10 10/12/2020 1501    BILITOT 0.7 10/12/2020 1501    ESTGFRAFRICA >60 10/12/2020 1501    EGFRNONAA >60 10/12/2020 1501            Lab Results   Component Value Date    WBC 11.87 10/16/2020    HGB 12.3 10/16/2020    HCT 37.8 10/16/2020    MCV 88 10/16/2020     10/16/2020       No results for input(s): PT, INR, PROTIME, APTT in the last 72 hours.        Anesthesia Evaluation    I have reviewed the Patient Summary Reports.      I have reviewed the Medications.     Review of Systems  Anesthesia Hx:  No previous Anesthesia  Neg history of prior  surgery. Denies Family Hx of Anesthesia complications.    Social:  Non-Smoker    Hematology/Oncology:  Hematology Normal        Cardiovascular:  Cardiovascular Normal     Pulmonary:  Pulmonary Normal    Renal/:  Renal/ Normal     Hepatic/GI:  Hepatic/GI Normal    Musculoskeletal:  Musculoskeletal Normal    Neurological:  Neurology Normal    Endocrine:  Endocrine Normal        Physical Exam  General:  Well nourished    Airway/Jaw/Neck:  Airway Findings: Mouth Opening: Normal Tongue: Normal  General Airway Assessment: Adult  Mallampati: I  Jaw/Neck Findings:  Neck ROM: Normal ROM      Dental:  Dental Findings: In tact   Chest/Lungs:  Chest/Lungs Findings: Normal Respiratory Rate     Heart/Vascular:  Heart Findings: Rate: Normal  Rhythm: Regular Rhythm        Mental Status:  Mental Status Findings:  Cooperative, Alert and Oriented         Anesthesia Plan  Type of Anesthesia, risks & benefits discussed:  Anesthesia Type:  CSE, epidural, general, spinal  Patient's Preference:   Intra-op Monitoring Plan: standard ASA monitors  Intra-op Monitoring Plan Comments:   Post Op Pain Control Plan: multimodal analgesia, IV/PO Opioids PRN and per primary service following discharge from PACU  Post Op Pain Control Plan Comments:   Induction:    Beta Blocker:  Patient is not currently on a Beta-Blocker (No further documentation required).       Informed Consent: Patient understands risks and agrees with Anesthesia plan.  Questions answered. Anesthesia consent signed with patient.  ASA Score: 2     Day of Surgery Review of History & Physical:    H&P update referred to the surgeon.         Ready For Surgery From Anesthesia Perspective.

## 2020-10-17 PROBLEM — Z13.9 RISK AND FUNCTIONAL ASSESSMENT: Status: RESOLVED | Noted: 2020-08-24 | Resolved: 2020-10-17

## 2020-10-17 PROBLEM — Z34.90 ENCOUNTER FOR ELECTIVE INDUCTION OF LABOR: Status: RESOLVED | Noted: 2020-10-16 | Resolved: 2020-10-17

## 2020-10-17 PROBLEM — Z34.90 ENCOUNTER FOR ELECTIVE INDUCTION OF LABOR: Status: ACTIVE | Noted: 2020-10-17

## 2020-10-17 LAB
BASOPHILS # BLD AUTO: 0.03 K/UL (ref 0–0.2)
BASOPHILS NFR BLD: 0.2 % (ref 0–1.9)
DIFFERENTIAL METHOD: ABNORMAL
EOSINOPHIL # BLD AUTO: 0.1 K/UL (ref 0–0.5)
EOSINOPHIL NFR BLD: 0.6 % (ref 0–8)
ERYTHROCYTE [DISTWIDTH] IN BLOOD BY AUTOMATED COUNT: 14.2 % (ref 11.5–14.5)
HCT VFR BLD AUTO: 37.2 % (ref 37–48.5)
HGB BLD-MCNC: 12 G/DL (ref 12–16)
IMM GRANULOCYTES # BLD AUTO: 0.1 K/UL (ref 0–0.04)
IMM GRANULOCYTES NFR BLD AUTO: 0.7 % (ref 0–0.5)
LYMPHOCYTES # BLD AUTO: 1.5 K/UL (ref 1–4.8)
LYMPHOCYTES NFR BLD: 10.3 % (ref 18–48)
MCH RBC QN AUTO: 28.4 PG (ref 27–31)
MCHC RBC AUTO-ENTMCNC: 32.3 G/DL (ref 32–36)
MCV RBC AUTO: 88 FL (ref 82–98)
MONOCYTES # BLD AUTO: 1.3 K/UL (ref 0.3–1)
MONOCYTES NFR BLD: 9.3 % (ref 4–15)
NEUTROPHILS # BLD AUTO: 11.1 K/UL (ref 1.8–7.7)
NEUTROPHILS NFR BLD: 78.9 % (ref 38–73)
NRBC BLD-RTO: 0 /100 WBC
PLATELET # BLD AUTO: 162 K/UL (ref 150–350)
PMV BLD AUTO: 11.1 FL (ref 9.2–12.9)
RBC # BLD AUTO: 4.22 M/UL (ref 4–5.4)
WBC # BLD AUTO: 14.12 K/UL (ref 3.9–12.7)

## 2020-10-17 PROCEDURE — 63600175 PHARM REV CODE 636 W HCPCS: Performed by: ADVANCED PRACTICE MIDWIFE

## 2020-10-17 PROCEDURE — 59410 OBSTETRICAL CARE: CPT | Mod: TH,,, | Performed by: ADVANCED PRACTICE MIDWIFE

## 2020-10-17 PROCEDURE — 85025 COMPLETE CBC W/AUTO DIFF WBC: CPT

## 2020-10-17 PROCEDURE — 51702 INSERT TEMP BLADDER CATH: CPT

## 2020-10-17 PROCEDURE — 72100002 HC LABOR CARE, 1ST 8 HOURS

## 2020-10-17 PROCEDURE — 25000003 PHARM REV CODE 250: Performed by: ADVANCED PRACTICE MIDWIFE

## 2020-10-17 PROCEDURE — 63600175 PHARM REV CODE 636 W HCPCS: Performed by: ANESTHESIOLOGY

## 2020-10-17 PROCEDURE — 72100003 HC LABOR CARE, EA. ADDL. 8 HRS

## 2020-10-17 PROCEDURE — 11000001 HC ACUTE MED/SURG PRIVATE ROOM

## 2020-10-17 PROCEDURE — 36415 COLL VENOUS BLD VENIPUNCTURE: CPT

## 2020-10-17 PROCEDURE — 72200005 HC VAGINAL DELIVERY LEVEL II

## 2020-10-17 PROCEDURE — 59410 PR OBSTE CARE,VAG DELIV+POSTPARTUM: ICD-10-PCS | Mod: TH,,, | Performed by: ADVANCED PRACTICE MIDWIFE

## 2020-10-17 RX ORDER — ACETAMINOPHEN 325 MG/1
650 TABLET ORAL EVERY 6 HOURS PRN
Status: DISCONTINUED | OUTPATIENT
Start: 2020-10-17 | End: 2020-10-19 | Stop reason: HOSPADM

## 2020-10-17 RX ORDER — CARBOPROST TROMETHAMINE 250 UG/ML
INJECTION, SOLUTION INTRAMUSCULAR
Status: DISCONTINUED
Start: 2020-10-17 | End: 2020-10-17 | Stop reason: WASHOUT

## 2020-10-17 RX ORDER — OXYTOCIN/RINGER'S LACTATE 30/500 ML
95 PLASTIC BAG, INJECTION (ML) INTRAVENOUS ONCE
Status: DISCONTINUED | OUTPATIENT
Start: 2020-10-17 | End: 2020-10-19 | Stop reason: HOSPADM

## 2020-10-17 RX ORDER — METHYLERGONOVINE MALEATE 0.2 MG/ML
INJECTION INTRAVENOUS
Status: DISCONTINUED
Start: 2020-10-17 | End: 2020-10-17 | Stop reason: WASHOUT

## 2020-10-17 RX ORDER — IBUPROFEN 600 MG/1
600 TABLET ORAL EVERY 6 HOURS PRN
Status: DISCONTINUED | OUTPATIENT
Start: 2020-10-17 | End: 2020-10-19 | Stop reason: HOSPADM

## 2020-10-17 RX ORDER — HYDROCORTISONE 25 MG/G
CREAM TOPICAL 3 TIMES DAILY PRN
Status: DISCONTINUED | OUTPATIENT
Start: 2020-10-17 | End: 2020-10-19 | Stop reason: HOSPADM

## 2020-10-17 RX ORDER — DIPHENHYDRAMINE HCL 25 MG
25 CAPSULE ORAL EVERY 4 HOURS PRN
Status: DISCONTINUED | OUTPATIENT
Start: 2020-10-17 | End: 2020-10-19 | Stop reason: HOSPADM

## 2020-10-17 RX ORDER — DOCUSATE SODIUM 100 MG/1
200 CAPSULE, LIQUID FILLED ORAL 2 TIMES DAILY PRN
Status: DISCONTINUED | OUTPATIENT
Start: 2020-10-17 | End: 2020-10-19 | Stop reason: HOSPADM

## 2020-10-17 RX ORDER — ONDANSETRON 8 MG/1
8 TABLET, ORALLY DISINTEGRATING ORAL EVERY 8 HOURS PRN
Status: DISCONTINUED | OUTPATIENT
Start: 2020-10-17 | End: 2020-10-19 | Stop reason: HOSPADM

## 2020-10-17 RX ORDER — DIPHENHYDRAMINE HYDROCHLORIDE 50 MG/ML
25 INJECTION INTRAMUSCULAR; INTRAVENOUS EVERY 4 HOURS PRN
Status: DISCONTINUED | OUTPATIENT
Start: 2020-10-17 | End: 2020-10-19 | Stop reason: HOSPADM

## 2020-10-17 RX ORDER — PRENATAL WITH FERROUS FUM AND FOLIC ACID 3080; 920; 120; 400; 22; 1.84; 3; 20; 10; 1; 12; 200; 27; 25; 2 [IU]/1; [IU]/1; MG/1; [IU]/1; MG/1; MG/1; MG/1; MG/1; MG/1; MG/1; UG/1; MG/1; MG/1; MG/1; MG/1
1 TABLET ORAL DAILY
Status: DISCONTINUED | OUTPATIENT
Start: 2020-10-17 | End: 2020-10-19 | Stop reason: HOSPADM

## 2020-10-17 RX ORDER — SIMETHICONE 80 MG
1 TABLET,CHEWABLE ORAL EVERY 6 HOURS PRN
Status: DISCONTINUED | OUTPATIENT
Start: 2020-10-17 | End: 2020-10-19 | Stop reason: HOSPADM

## 2020-10-17 RX ORDER — MISOPROSTOL 200 UG/1
TABLET ORAL
Status: DISCONTINUED
Start: 2020-10-17 | End: 2020-10-17 | Stop reason: WASHOUT

## 2020-10-17 RX ORDER — SODIUM CITRATE AND CITRIC ACID MONOHYDRATE 334; 500 MG/5ML; MG/5ML
30 SOLUTION ORAL ONCE
Status: DISCONTINUED | OUTPATIENT
Start: 2020-10-17 | End: 2020-10-17

## 2020-10-17 RX ADMIN — PRENATAL VIT W/ FE FUMARATE-FA TAB 27-0.8 MG 1 TABLET: 27-0.8 TAB at 08:10

## 2020-10-17 RX ADMIN — SODIUM CHLORIDE, SODIUM LACTATE, POTASSIUM CHLORIDE, AND CALCIUM CHLORIDE: .6; .31; .03; .02 INJECTION, SOLUTION INTRAVENOUS at 02:10

## 2020-10-17 RX ADMIN — IBUPROFEN 600 MG: 600 TABLET, FILM COATED ORAL at 03:10

## 2020-10-17 RX ADMIN — IBUPROFEN 600 MG: 600 TABLET, FILM COATED ORAL at 09:10

## 2020-10-17 RX ADMIN — CALCIUM CARBONATE (ANTACID) CHEW TAB 500 MG 1000 MG: 500 CHEW TAB at 02:10

## 2020-10-17 NOTE — PROGRESS NOTES
S:   Patient is comfortable with epidural, no complaints; family remains at bedside and supportive      O:   /64   Pulse 82   Temp 96.4 °F (35.8 °C) (Temporal)   Resp 18   LMP 2020   SpO2 98%   Breastfeeding No   FHTs: baseline 130, moderate arielle, positive accels, no decels  [place]: ctns q 2-3mins, lasting 60 to 90 secs, moderate to palpation, relaxed tone between  SVE 5/70/-2   Cephalic presentation  AROM clear fluid  Meds infusing: pit at 14mu    A:  30 y.o.  IUP at 41w1d  Category 2 tracing  Active labor  P:  Continue titration of pitocin  Reviewed risks/benefits of arom--patient agreeable  Recheck 2hrs prn    Rajani Barker CNM, MSN  10/17/2020  1:12 AM

## 2020-10-17 NOTE — PROGRESS NOTES
S:     Patient coping well with support; family remains at beside and supportive.    O:   /72   Pulse 77   Temp 97.2 °F (36.2 °C) (Axillary)   Resp 18   LMP 2020   SpO2 97%   Breastfeeding No   FHTs: baseline 140, moderate arielle, positive accels, no decels  [place]: ctns q 3 to 4 mins, lasting 60 to 90 secs, moderate to palpation, relaxed tone between  SVE 4/60/-2/ soft/ posterior   Cephalic presentation  Intact BOW--patient declines AROM at this time.  Meds infusing: Pitocin at 10mu    A:  30 y.o.  IUP at 41w0d  Category 1 tracing  Latent labor/ IOL    P:  Continue pitocin  Reviewed risks/benefits of AROM---will consider at next check. Patient declines AROM at this time.   She denies need for pain meds although considering an epidural   Anesthesia to be called upon patient request.    Rajani Barker CNM, MSN  10/16/2020  10:42 PM

## 2020-10-17 NOTE — LACTATION NOTE
10/17/20 1530   Maternal Assessment   Breast Shape Bilateral:;round   Breast Density Bilateral:;soft   Areola Bilateral:;elastic   Nipples Bilateral:;everted;graspable   Left Nipple Symptoms redness;tender   Right Nipple Symptoms redness;tender   Maternal Infant Feeding   Maternal Emotional State assist needed;relaxed   Infant Positioning clutch/football;cross-cradle   Signs of Milk Transfer audible swallow;infant jaw motion present   Pain with Feeding yes   Pain Location nipple, left   Pain Description sharp   Latch Assistance yes   Additional Documentation Breastfeeding Supplementation (Group)   Breastfeeding Supplementation   Infant Indication for Supplementation oral/motor dysfunction   Breastfeeding Supplementation Type expressed breast milk   Method of Supplementation finger;spoon   Assisted mom with positioning and latch. After multiple attempts and position changes unable to achieve comfortable latch. Oral assessment done and infant keeps tongue behind lower gum line and does not extend with sucking. Easily able t remove finger from infants moth. After suck exercises attempted latch again but unable to achieve comfortable latch. Educated mom on hand expression and HE 1 ml and spoonfed infant. Encouraged frequent STS, suck exercises before nursing and tummy time with diaper changes. Mom to HE after nursing or in place of if unable to latch. May initiate breast pump at any time. Mom to call  as needed for further assistance.

## 2020-10-17 NOTE — DISCHARGE INSTRUCTIONS
Breastfeeding Discharge Instructions       Feed the baby at the earliest sign of hunger or comfort  o Hands to mouth, sucking motions  o Rooting or searching for something to suck on  o Dont wait for crying - it is a sign of distress     The feedings may be 8-12 times per 24hrs and will not follow a schedule   Avoid pacifiers and bottles for the first 4 weeks   Alternate the breast you start the feeding with, or start with the breast that feels the fullest   Switch breasts when the baby takes himself off the breast or falls asleep   Keep offering breasts until the baby looks full, no longer gives hunger signs, and stays asleep when placed on his back in the crib   If the baby is sleepy and wont wake for a feeding, put the baby skin-to-skin dressed in a diaper against the mothers bare chest   Sleep near your baby   The baby should be positioned and latched on to the breast correctly  o Chest-to-chest, chin in the breast  o Babys lips are flipped outward  o Babys mouth is stretched open wide like a shout  o Babys sucking should feel like tugging to the mother  - The baby should be drinking at the breast:  o You should hear swallowing or gulping throughout the feeding  o You should see milk on the babys lips when he comes off the breast  o Your breasts should be softer when the baby is finished feeding  o The baby should look relaxed at the end of feedings  o After the 4th day and your milk is in:  o The babys poop should turn bright yellow and be loose, watery, and seedy  o The baby should have at least 3-4 poops the size of the palm of your hand per day  o The baby should have at least 5-6 wet diapers per day  o The urine should be light yellow in color  You should drink when you are thirsty and eat a healthy diet when you are    hungry.     Take naps to get the rest you need.   Take medications and/or drink alcohol only with permission of your obstetrician    or the babys pediatrician.  You can  also call the Infant Risk Center,   ([phone]), Monday-Friday, 8am-5pm Central time, to get the most   up-to-date evidence-based information on the use of medications during   pregnancy and breastfeeding.      The baby should be examined by a pediatrician at 3-5 days of age.  Once your   milk comes in, the baby should be gaining at least ½ - 1oz each day and should be back to birthweight no later than 10-14 days of age.          Community Resources    [facility] Breastfeeding Warmline: [phone]   Local [facility] clinics: provide incentives and breastpumps to eligible mothers  [employer] (LLLI):  mother-to-mother support group website        www.KnewCoinl.Pensqr  Local La Leche League mother-to-mother support groups:        www.[employer]        La Leche League [facility]   Dr. Kian Ruiz website for latch videos and general information:        www.breastfeedinginc.ca  Infant Risk Center is a call center that provides information about the safety of taking medications while breastfeeding.  Call [phone], M-F, 8am-5pm, CT.  International Lactation Consultant Association provides resources for assistance:        www.ilca.org  [place] Breastfeeding Coalition provides informationand resources for parents  and the community    www.LaBreastfeedingSupport.org     Leti Terry is a mom-to-mom support group:                             www.[employer].com//breastfeedng-support/  [employer] for Healthy Babies:  7-694-521-BABY(6332)  [employer]: a breastfeeding support group for women of color, [phone]

## 2020-10-17 NOTE — L&D DELIVERY NOTE
[facility]  Vaginal Delivery   Operative Note    SUMMARY     Normal spontaneous vaginal delivery of live infant at 0251, was placed on mothers abdomen for skin to skin and bulb suctioning performed.  Infant delivered position OA over perineum.  Nuchal cord: Yes, cord reduced at perineum.    Spontaneous delivery of placenta ballard mechanism at 0307 and IV pitocin given noting good uterine tone.  1st degree laceration noted, repaired with 3.0 vicryl under epidural anesthesia.  Patient tolerated delivery well. Sponge needle and lap counted correctly x2.    Indications: Encounter for elective induction of labor  Pregnancy complicated by:   Patient Active Problem List   Diagnosis    Uterine fibroid    Migraine without aura, not intractable, without status migrainosus    Pregnancy    Birth Center Risk Assessment: 0- Meets birth center guidelines    Vulvar varices during pregnancy    Encounter for elective induction of labor     Admitting GA: 41w1d    Delivery Information for Anay Pendleton    Birth information:  YOB: 2020   Time of birth: 2:51 AM   Sex: female   Head Delivery Date/Time: 10/17/2020  2:51 AM   Delivery type: Vaginal, Spontaneous   Gestational Age: 41w1d    Delivery Providers    Delivering clinician: Rajani Barker CNM   Provider Role    INOCENCIO Dumont RN Ashley Spears, ST             Measurements    Weight:   Length:          Apgars    Living status: Living  Apgars:  1 min.:  5 min.:  10 min.:  15 min.:  20 min.:    Skin color:  0  1       Heart rate:  2  2       Reflex irritability:  1  2       Muscle tone:  2  2       Respiratory effort:  2  2       Total:  7  9       Apgars assigned by: SYLVIE POWERS RN         Operative Delivery    Forceps attempted?: No  Vacuum extractor attempted?: No         Shoulder Dystocia    Shoulder dystocia present?: No           Presentation    Presentation: Vertex  Position: Middle Occiput  Anterior           Interventions/Resuscitation    Method: Bulb Suctioning, Tactile Stimulation       Cord    Vessels: 3 vessels  Complications: Nuchal  Nuchal Intervention: reduced  Nuchal Cord Description: loose nuchal cord  Number of Loops: 1  Delayed Cord Clamping?: Yes  Cord Clamped Date/Time: 10/17/2020  3:34 AM  Cord Blood Disposition: Sent with Baby  Gases Sent?: No  Stem Cell Collection (by MD): No       Placenta    Placenta delivery date/time: 10/17/2020 0307  Placenta removal: Spontaneous  Placenta appearance: Intact  Placenta disposition: discarded           Labor Events:       labor: No     Labor Onset Date/Time:         Dilation Complete Date/Time:         Start Pushing Date/Time:         Start Pushing Date/Time:       Rupture Date/Time: 10/17/20  0045         Rupture type:          Fluid Amount:       Fluid Color: Clear      Fluid Odor:       Membrane Status: ARM (Artificial Rupture)               steroids:       Antibiotics given for GBS:       Induction:       Indications for induction:  Elective     Augmentation: amniotomy     Indications for augmentation:       Labor complications: None     Additional complications:          Cervical ripening:                     Delivery:      Episiotomy: None     Indication for Episiotomy:       Perineal Lacerations: 1st Repaired:  Yes   Periurethral Laceration:   Repaired:     Labial Laceration:   Repaired:     Sulcus Laceration:   Repaired:     Vaginal Laceration:   Repaired:     Cervical Laceration:   Repaired:     Repair suture:       Repair # of packets: 1     Last Value - EBL - Nursing (mL): 200     Sum - EBL - Nursing (mL): 200     Last Value - EBL - Anesthesia (mL):      Calculated QBL (mL):       Vaginal Sweep Performed: Yes     Surgicount Correct: Yes       Other providers:       Anesthesia    Method: Epidural          Details (if applicable):  Trial of Labor      Categorization:      Priority:     Indications  for :     Incision Type:       Additional  information:  Forceps:    Vacuum:    Breech:    Observed anomalies    Other (Comments):  live infant female. 1st degree repaired with 3.0 vicryl under epidural anesthesia.  Patient and infant left in stable condition.  Rajani Barker CNM, MSN  10/17/2020  3:36 AM

## 2020-10-17 NOTE — LACTATION NOTE
Basic lactation education reviewed, all questions answered. Mom states latch has been painful, encouraged mom to call for latch assistance next feeding. LC left phone number on mother's white board for mother to call for asst as needed.Told mother what time LC leaves the floor. Mother also told that LC can see when she calls [employer] phone and if LC does not answer, she is busy but will come as soon as possible.

## 2020-10-18 PROCEDURE — 25000003 PHARM REV CODE 250: Performed by: ADVANCED PRACTICE MIDWIFE

## 2020-10-18 PROCEDURE — 11000001 HC ACUTE MED/SURG PRIVATE ROOM

## 2020-10-18 RX ADMIN — PRENATAL VIT W/ FE FUMARATE-FA TAB 27-0.8 MG 1 TABLET: 27-0.8 TAB at 08:10

## 2020-10-18 RX ADMIN — IBUPROFEN 600 MG: 600 TABLET, FILM COATED ORAL at 11:10

## 2020-10-18 RX ADMIN — IBUPROFEN 600 MG: 600 TABLET, FILM COATED ORAL at 05:10

## 2020-10-18 NOTE — PROGRESS NOTES
C/o back pain at site of epidural, did have anesthesia evaluate  Encouraged to take pain medication as needed and anesthesia will re-evaluate tomorrow

## 2020-10-18 NOTE — PROGRESS NOTES
[facility]  Obstetrics  Postpartum Progress Note    Patient Name: Catie Pendleton  MRN: [id number]  Admission Date: 10/16/2020  Hospital Length of Stay: 2 days  Attending Physician: Ave Cochran MD  Primary Care Provider: Primary Doctor No    Subjective:     Principal Problem:Encounter for elective induction of labor    Hospital course: 1230: Admission/ covid testing negative  1330: SVE 3/50/-3/ posterior/ soft, vertex presentation, bow intact  1345: Pitocin Started  2344: Epidural placement  10/17/20  0045: AROM-clear fluid        Interval History: PPD #1 s/p , 1st degree laceration Breastfeeding.     She is doing well this morning. She is tolerating a regular diet without nausea or vomiting. She is voiding spontaneously. She is ambulating. She has passed flatus, and has not a BM. Vaginal bleeding is moderate. She denies fever or chills. Abdominal pain is mild and controlled with oral medications. She is breastfeeding. She desires circumcision for her male baby: not applicable.     Objective:     Vital Signs (Most Recent):  Temp: 97.7 °F (36.5 °C) (10/18/20 0814)  Pulse: 71 (10/18/20 0814)  Resp: 16 (10/18/20 0814)  BP: (!) 103/58 (10/18/20 0814)  SpO2: 96 % (10/18/20 0814) Vital Signs (24h Range):  Temp:  [97.7 °F (36.5 °C)-98.1 °F (36.7 °C)] 97.7 °F (36.5 °C)  Pulse:  [62-76] 71  Resp:  [16-17] 16  SpO2:  [95 %-97 %] 96 %  BP: (103-108)/(58-62) 103/58        There is no height or weight on file to calculate BMI.      Intake/Output Summary (Last 24 hours) at 10/18/2020 1051  Last data filed at 10/17/2020 1300  Gross per 24 hour   Intake --   Output 1500 ml   Net -1500 ml       Physical Exam  Constitutional:       Appearance: She is well-developed.   Genitourinary:      Vagina normal.      Genitourinary Comments: Perineum clean, dry, intact. Minimal edema noted.   No evidence of hematoma or abnormal healing noted.  Lochia scant rubra.   HENT:      Head: Normocephalic and atraumatic.   Neck:       Musculoskeletal: Normal range of motion and neck supple.   Cardiovascular:      Rate and Rhythm: Normal rate and regular rhythm.   Pulmonary:      Effort: Pulmonary effort is normal.      Breath sounds: Normal breath sounds.   Abdominal:      General: Bowel sounds are normal.      Palpations: Abdomen is soft.      Comments: Fundus firm @   Musculoskeletal: Normal range of motion.         General: No tenderness.   Neurological:      Mental Status: She is alert and oriented to person, place, and time.   Skin:     General: Skin is warm and dry.   Psychiatric:         Thought Content: Thought content normal.            Significant Labs:  Lab Results   Component Value Date    GROUPTRH A POS 10/16/2020    STREPBCULT No Group B Streptococcus isolated 2020     Recent Labs   Lab 10/17/20  1710   HGB 12.0   HCT 37.2       CBC:   Recent Labs   Lab 10/17/20  1710   WBC 14.12*   RBC 4.22   HGB 12.0   HCT 37.2      MCV 88   MCH 28.4   MCHC 32.3     I have personallly reviewed all pertinent lab results from the last 24 hours.    Assessment/Plan:     30 y.o. female  at 41w1d for:    Active Diagnoses:    Diagnosis Date Noted POA     (normal spontaneous vaginal delivery) [O80] 10/17/2020 Not Applicable    Lactating mother [Z39.1] 10/17/2020 Not Applicable    Encounter for elective induction of labor [Z34.90] 10/17/2020 Not Applicable    Vulvar varices during pregnancy [O22.10] 2020 Yes    Pregnancy [Z34.90] 2020 Not Applicable      Problems Resolved During this Admission:    Diagnosis Date Noted Date Resolved POA    PRINCIPAL PROBLEM:  Encounter for elective induction of labor [Z34.90] 10/16/2020 10/17/2020 Not Applicable    Birth Center Risk Assessment: 0- Meets birth center guidelines [Z13.9] 2020 10/17/2020 Not Applicable           Disposition: As patient meets milestones, will plan to discharge 10/19/20..    Rajani Barker CNM  Obstetrics  [facility]

## 2020-10-18 NOTE — ANESTHESIA POSTPROCEDURE EVALUATION
Anesthesia Post Evaluation    Patient: Catie Pendleton    Procedure(s) Performed: * No procedures listed *    Final Anesthesia Type: epidural    Patient location during evaluation: floor  Patient participation: Yes- Able to Participate  Level of consciousness: awake and alert and oriented  Post-procedure vital signs: reviewed and stable  Pain management: adequate  Airway patency: patent    PONV status at discharge: No PONV  Anesthetic complications: no    Nicci-operative Events Comments: Patient is complaining of low back pain at the epidural insertion site. Denies BLE weakness, paraesthesia, or loss of bladder or bowel control. On exam, mildly tender to palpation. She reports that warm compresses help with the pain. Pain is likely from mild bruising of SQ tissue  Cardiovascular status: blood pressure returned to baseline and hemodynamically stable  Respiratory status: unassisted, spontaneous ventilation and room air  Hydration status: euvolemic  Follow-up not needed.          Vitals Value Taken Time   /61 10/17/20 2326   Temp 36.7 °C (98.1 °F) 10/17/20 2326   Pulse 62 10/17/20 2326   Resp 17 10/17/20 2326   SpO2 95 % 10/17/20 2326         No case tracking events are documented in the log.      Pain/Nicholas Score: Pain Rating Prior to Med Admin: 5 (10/18/2020  5:22 AM)  Pain Rating Post Med Admin: 4 (10/18/2020  5:51 AM)

## 2020-10-18 NOTE — PROGRESS NOTES
RN called to room to discuss feedings for baby. Mother was concerned baby is hungry and she's not getting what she needs at this time. After feedings baby is still fussy and rooting. Mother requesting formula at this time, but reiterated she is not giving up on breastfeeding. She just would like to supplement after feeds tonight to keep baby satisfied. RN also brought breastpump to get mom started pumping to help get some extra breast stimulation.     [employer] pump, tubing, collections containers and labels brought to bedside.  Discussed proper pump setting of initiation phase.  Instructed on proper usage of pump and to adjust suction according to maximum comfort level.  Verified appropriate flange fit.  Educated on the frequency and duration of pumping in order to promote and maintain a full milk supply.  Hands on pumping technique reviewed.  Encouraged hand expression after pumping.  Instructed on cleaning of breast pump parts.  Written instructions also given.  Pt verbalized understanding and appropriate recall for proper milk handling, collection, labeling, storage and transportation.    Formula feeding education reviewed at this time as well. Mother verbalizes understanding.

## 2020-10-18 NOTE — LACTATION NOTE
10/18/20 1315   Maternal Assessment   Breast Shape Bilateral:;round   Breast Density Bilateral:;soft   Areola Bilateral:;elastic   Nipples Bilateral:;everted   Left Nipple Symptoms redness;tender   Right Nipple Symptoms redness;tender   Maternal Infant Feeding   Maternal Emotional State assist needed;anxious   Infant Positioning cross-cradle   Signs of Milk Transfer infant jaw motion present   Pain with Feeding yes   Pain Location nipples, bilateral   Pain Description sharp;soreness   Nipple Shape After Feeding, Right round   Latch Assistance yes   Breastfeeding Supplementation   Infant Indication for Supplementation oral/motor dysfunction   Breastfeeding Supplementation Type formula   Method of Supplementation paced bottle   Nipple Used For Supplementation slow flow   Equipment Type   Breast Pump Type double electric, hospital grade   Breast Pump Flange Type hard   Breast Pump Flange Size 24 mm   Breast Pumping   Breast Pumping Interventions post-feed pumping encouraged;frequent pumping encouraged;early pumping promoted   Breast Pumping bilateral breasts pumped until soft;double electric breast pump utilized;pre-pumping breast massage;pre-pumping hand expression;pre-pumping tactile stimulation     1100- LC to room, basic lactation education reviewed. Baby recently had formula, asleep now. Assisted mother with pumping session, 24mm flange, 3mL EBM expressed to use next feeding as supplement.  number on board.   1315- called to room, mom just spoonfed EBM. Baby rooting, attempted latch, baby sucking with front of mouth, non nutritive sucking and painful for mom. Nipple shield utilized to improve suck, baby does not nurse better with shield, baby easily fatigued. Assisted with paced bottle feed using slow flow Aqua nipple, baby does not bottle feed well, baby does not create seal or suction on breast or bottle; able to take 10mL formula with moderate assistance and pacing. Discussed baby's first time attempting  bottle; discussed consulting SLP for oral function eval. Ped notified of assessment.   1600- LC to bedside with SLP. Attempted latch again with nipple shield, baby non nutritive at breast, no milk transfer noted. SLP assisted with paced bottle feed using Dr Walsh's preemie nipple, baby only able to take 1mL. Attempted using aqua nipple, baby unorganized, but able to take 10mL with moderate assistance. SLP taught parents sucking exercises to improve baby's oral function. Mother tearful, emotional support provided.    Feeding plan to attempt latch, bottle feed, pump each feeding to stimulate milk production.

## 2020-10-19 VITALS
OXYGEN SATURATION: 95 % | HEART RATE: 75 BPM | DIASTOLIC BLOOD PRESSURE: 69 MMHG | TEMPERATURE: 98 F | SYSTOLIC BLOOD PRESSURE: 110 MMHG | RESPIRATION RATE: 18 BRPM

## 2020-10-19 PROBLEM — Z34.90 ENCOUNTER FOR ELECTIVE INDUCTION OF LABOR: Status: RESOLVED | Noted: 2020-10-17 | Resolved: 2020-10-19

## 2020-10-19 PROCEDURE — 90686 IIV4 VACC NO PRSV 0.5 ML IM: CPT | Performed by: ADVANCED PRACTICE MIDWIFE

## 2020-10-19 PROCEDURE — 25000003 PHARM REV CODE 250: Performed by: ADVANCED PRACTICE MIDWIFE

## 2020-10-19 PROCEDURE — 63600175 PHARM REV CODE 636 W HCPCS: Performed by: ADVANCED PRACTICE MIDWIFE

## 2020-10-19 PROCEDURE — 90471 IMMUNIZATION ADMIN: CPT | Performed by: ADVANCED PRACTICE MIDWIFE

## 2020-10-19 RX ADMIN — INFLUENZA VIRUS VACCINE 0.5 ML: 15; 15; 15; 15 SUSPENSION INTRAMUSCULAR at 01:10

## 2020-10-19 RX ADMIN — PRENATAL VIT W/ FE FUMARATE-FA TAB 27-0.8 MG 1 TABLET: 27-0.8 TAB at 08:10

## 2020-10-19 RX ADMIN — IBUPROFEN 600 MG: 600 TABLET, FILM COATED ORAL at 12:10

## 2020-10-19 RX ADMIN — IBUPROFEN 600 MG: 600 TABLET, FILM COATED ORAL at 06:10

## 2020-10-19 NOTE — PLAN OF CARE
Problem: Adult Inpatient Plan of Care  Goal: Plan of Care Review  Outcome: Ongoing, Progressing  Goal: Patient-Specific Goal (Individualization)  Outcome: Ongoing, Progressing  Goal: Absence of Hospital-Acquired Illness or Injury  Outcome: Ongoing, Progressing  Goal: Optimal Comfort and Wellbeing  Outcome: Ongoing, Progressing  Goal: Readiness for Transition of Care  Outcome: Ongoing, Progressing  Goal: Rounds/Family Conference  Outcome: Ongoing, Progressing     Problem:  Fall Injury Risk  Goal: Absence of Fall, Infant Drop and Related Injury  Outcome: Ongoing, Progressing     Problem: Bleeding (Labor)  Goal: Hemostasis  Outcome: Ongoing, Progressing     Problem: Change in Fetal Wellbeing (Labor)  Goal: Stable Fetal Wellbeing  Outcome: Ongoing, Progressing     Problem: Delayed Labor Progression (Labor)  Goal: Effective Progression to Delivery  Outcome: Ongoing, Progressing     Problem: Infection (Labor)  Goal: Absence of Infection Signs/Symptoms  Outcome: Ongoing, Progressing     Problem: Labor Pain (Labor)  Goal: Acceptable Pain Control  Outcome: Ongoing, Progressing     Problem: Uterine Tachysystole (Labor)  Goal: Normal Uterine Contraction Pattern  Outcome: Ongoing, Progressing     Problem: Breastfeeding  Goal: Effective Breastfeeding  Outcome: Ongoing, Progressing     Problem: Infection  Goal: Infection Symptom Resolution  Outcome: Ongoing, Progressing     Problem: Adjustment to Role Transition (Postpartum Vaginal Delivery)  Goal: Successful Maternal Role Transition  Outcome: Ongoing, Progressing     Problem: Bleeding (Postpartum Vaginal Delivery)  Goal: Hemostasis  Outcome: Ongoing, Progressing     Problem: Infection (Postpartum Vaginal Delivery)  Goal: Absence of Infection Signs/Symptoms  Outcome: Ongoing, Progressing     Problem: Pain (Postpartum Vaginal Delivery)  Goal: Acceptable Pain Control  Outcome: Ongoing, Progressing     Problem: Urinary Retention (Postpartum Vaginal Delivery)  Goal:  Effective Urinary Elimination  Outcome: Ongoing, Progressing     VSS. Plan for discharge today. Pain controlled well with PO medications.

## 2020-10-19 NOTE — LACTATION NOTE
LC met the SLP in the room to do a feeding with mother. Mother did a great job putting baby to the breast. Baby is not rooting at the breast. Mother states the baby had 15cc of formula  2 hrs ago. Mother will call when baby shows signs of wanting to nurse. Baby does seem to suck better on a gloved finger. LC will call SLP back to the room when mother ready.

## 2020-10-19 NOTE — PLAN OF CARE
Problem: Adult Inpatient Plan of Care  Goal: Plan of Care Review  10/19/2020 1353 by Joseph New RN  Outcome: Met  10/19/2020 1109 by Joseph New RN  Outcome: Ongoing, Progressing  Goal: Patient-Specific Goal (Individualization)  10/19/2020 1353 by Joseph New RN  Outcome: Met  10/19/2020 1109 by Joseph New RN  Outcome: Ongoing, Progressing  Goal: Absence of Hospital-Acquired Illness or Injury  10/19/2020 1353 by Joseph New RN  Outcome: Met  10/19/2020 1109 by Joseph New RN  Outcome: Ongoing, Progressing  Goal: Optimal Comfort and Wellbeing  10/19/2020 1353 by Joseph New RN  Outcome: Met  10/19/2020 1109 by Joseph New RN  Outcome: Ongoing, Progressing  Goal: Readiness for Transition of Care  10/19/2020 1353 by Joseph New RN  Outcome: Met  10/19/2020 1109 by Joseph New RN  Outcome: Ongoing, Progressing  Goal: Rounds/Family Conference  10/19/2020 1353 by Joseph New RN  Outcome: Met  10/19/2020 1109 by Jospeh New RN  Outcome: Ongoing, Progressing     Problem:  Fall Injury Risk  Goal: Absence of Fall, Infant Drop and Related Injury  10/19/2020 1353 by Joseph New RN  Outcome: Met  10/19/2020 1109 by Joseph New RN  Outcome: Ongoing, Progressing     Problem: Bleeding (Labor)  Goal: Hemostasis  10/19/2020 1353 by Joseph New RN  Outcome: Met  10/19/2020 1109 by Joseph New RN  Outcome: Ongoing, Progressing     Problem: Change in Fetal Wellbeing (Labor)  Goal: Stable Fetal Wellbeing  10/19/2020 1353 by Joseph New RN  Outcome: Met  10/19/2020 1109 by Joseph New RN  Outcome: Ongoing, Progressing     Problem: Delayed Labor Progression (Labor)  Goal: Effective Progression to Delivery  10/19/2020 1353 by Joseph New RN  Outcome: Met  10/19/2020 1109 by Joseph New RN  Outcome: Ongoing, Progressing     Problem: Infection (Labor)  Goal: Absence of Infection Signs/Symptoms  10/19/2020 1353 by Joseph New RN  Outcome: Met  10/19/2020 1109 by Joseph  INOCENCIO New  Outcome: Ongoing, Progressing     Problem: Labor Pain (Labor)  Goal: Acceptable Pain Control  10/19/2020 1353 by Joseph New RN  Outcome: Met  10/19/2020 1109 by Joseph New RN  Outcome: Ongoing, Progressing     Problem: Uterine Tachysystole (Labor)  Goal: Normal Uterine Contraction Pattern  10/19/2020 1353 by Joseph New RN  Outcome: Met  10/19/2020 1109 by Joseph New RN  Outcome: Ongoing, Progressing     Problem: Breastfeeding  Goal: Effective Breastfeeding  10/19/2020 1353 by Joseph New RN  Outcome: Met  10/19/2020 1109 by Joseph New RN  Outcome: Ongoing, Progressing     Problem: Infection  Goal: Infection Symptom Resolution  10/19/2020 1353 by Joseph New RN  Outcome: Met  10/19/2020 1109 by Joseph New RN  Outcome: Ongoing, Progressing     Problem: Adjustment to Role Transition (Postpartum Vaginal Delivery)  Goal: Successful Maternal Role Transition  10/19/2020 1353 by Joseph New RN  Outcome: Met  10/19/2020 1109 by Joseph New RN  Outcome: Ongoing, Progressing     Problem: Bleeding (Postpartum Vaginal Delivery)  Goal: Hemostasis  10/19/2020 1353 by Joseph New RN  Outcome: Met  10/19/2020 1109 by Joseph New RN  Outcome: Ongoing, Progressing     Problem: Infection (Postpartum Vaginal Delivery)  Goal: Absence of Infection Signs/Symptoms  10/19/2020 1353 by Joseph New RN  Outcome: Met  10/19/2020 1109 by Joseph New RN  Outcome: Ongoing, Progressing     Problem: Pain (Postpartum Vaginal Delivery)  Goal: Acceptable Pain Control  10/19/2020 1353 by Joseph New RN  Outcome: Met  10/19/2020 1109 by Joseph New RN  Outcome: Ongoing, Progressing     Problem: Urinary Retention (Postpartum Vaginal Delivery)  Goal: Effective Urinary Elimination  10/19/2020 1353 by Joseph New RN  Outcome: Met  10/19/2020 1109 by Joseph New RN  Outcome: Ongoing, Progressing     Patient doing well. VS stable. Pain controlled well with oral medication. Patient to  follow up in 6 weeks for PP check.

## 2020-10-19 NOTE — ASSESSMENT & PLAN NOTE
Pt ordering hydrogels for comfort and is pumping, latching some and following up with speech therapy and peds. Baby is doing exercises to improve suck, etc.

## 2020-10-19 NOTE — DISCHARGE SUMMARY
[facility], [place]  Obstetrics  Discharge Summary      Patient Name: Catie Pendleton  MRN: [id number]  Admission Date: 10/16/2020  Hospital Length of Stay: 3 days  Discharge Date and Time:  10/19/2020 11:32 AM  Attending Physician: Ave Cochran MD   Discharging Provider: Zahra Valle CNM   Primary Care Provider: Primary Doctor No    HPI:   Catie Pendleton is a 30 y.o. female  at 41w0d with Estimated Date of Delivery: 10/9/20 based on LMP confirmed by 7 week sonogram who presents to L&D for scheduled elective induction. She reports + FM. Denies VB, LOF or regular UCs. Accompanied by  BEBA to L&D. Expecting a girl!         Pregnancy has been c/b:    Patient Active Problem List   Diagnosis    Uterine fibroid    Migraine without aura, not intractable, without status migrainosus    Pregnancy    Birth Center Risk Assessment: 0- Meets birth center guidelines    Vulvar varices during pregnancy    Encounter for elective induction of labor           * No surgery found *     Hospital Course:   1230: Admission/ covid testing negative  1330: SVE 3/50/-3/ posterior/ soft, vertex presentation, bow intact  1345: Pitocin Started  2344: Epidural placement  10/17/20  0045: AROM-clear fluid  10/17/20 -    10/19/20 - Normal involution, nursing with some difficulty (baby has a developmental issue with sucking but has been nursing some and supplementing with formula also - lactation involved as well as peds and speech therapy). Pt states she is ready for discharge home pending baby's bilirubin level.    Doing okay - a little stressed about baby's feeding difficulties, ambulating, voiding, and tolerating regular diet  Lochia: steadily decreasing  Pain: well controlled   Breasts/nipples: breast and bottle feeding - seeing lactation and speech therapy  Depression/anxiety: denies   Support at home: good  [place]: Shavon is doing well, will f/u with pediatrician     Gen: A&O x 4, NAD  CV: normal  HR  Lungs: normal resp effort  Breasts: bilaterally soft, nipples tender and red   Abdomen: soft, non-tender, uterus firm at U - 1 fb  Perineum: approximated, no edema   Lochia: minimal rubra  Ext: bilaterally no pedal edema without signs of DVT     Consults (From admission, onward)        Status Ordering Provider     Consult to Lactation  Use PRN     Provider:  (Not yet assigned)    Acknowledged CARISSA MARTINEZ          Final Active Diagnoses:    Diagnosis Date Noted POA     (normal spontaneous vaginal delivery) [O80] 10/17/2020 Not Applicable    Lactating mother [Z39.1] 10/17/2020 Not Applicable    Vulvar varices during pregnancy [O22.10] 2020 Yes    Pregnancy [Z34.90] 2020 Not Applicable      Problems Resolved During this Admission:    Diagnosis Date Noted Date Resolved POA    PRINCIPAL PROBLEM:  Encounter for elective induction of labor [Z34.90] 10/16/2020 10/17/2020 Not Applicable    Encounter for elective induction of labor [Z34.90] 10/17/2020 10/19/2020 Not Applicable    Birth Center Risk Assessment: 0- Meets birth center guidelines [Z13.9] 2020 10/17/2020 Not Applicable        Significant Diagnostic Studies: Labs: All labs within the past 24 hours have been reviewed      Feeding Method: both breast and bottle    Immunizations     Date Immunization Status Dose Route/Site Given by    10/19/20 0046 Influenza (FLUBLOK) - Quadrivalent - Recombinant - PF *Preferred* (egg allergy) Deleted 0.5 mL Intramuscular/     10/19/20 0047 Influenza - Quadrivalent - PF *Preferred* (6 months and older) Deleted 0.5 mL Intramuscular/     10/19/20 0146 Influenza - Quadrivalent - PF *Preferred* (6 months and older) Incomplete 0.5 mL Intramuscular/           Delivery:    Episiotomy: None   Lacerations: 1st   Repair suture:     Repair # of packets: 1   Blood loss (ml): 200     Birth information:  YOB: 2020   Time of birth: 2:51 AM   Sex: female   Delivery type: Vaginal, Spontaneous    Gestational Age: 41w1d    Delivery Clinician:      Other providers:       Additional  information:  Forceps:    Vacuum:    Breech:    Observed anomalies      Living?:           APGARS  One minute Five minutes Ten minutes   Skin color:         Heart rate:         Grimace:         Muscle tone:         Breathing:         Totals: 7  9        Placenta: Delivered:       appearance    Pending Diagnostic Studies:     None          Discharged Condition: good    Disposition: Home or Self Care    Follow Up:  Follow-up Information     Zahra Valle CNM.    Specialty: Obstetrics and Gynecology  Why: PP VISIT or sooner as needed  Contact information:  [address] [place] [address] [id number] [phone]                 Patient Instructions:      BREAST PUMP FOR HOME USE     Order Specific Question Answer Comments   Type of pump: Electric    Weight: 74.5kg    Length of need (1-99 months): 99      Medications:  Current Discharge Medication List      CONTINUE these medications which have NOT CHANGED    Details   prenatal vit/iron fum/folic ac (PRENATAL 1+1 ORAL)              Zahra Valle CNM  Obstetrics  [facility]

## 2020-10-19 NOTE — LACTATION NOTE
LC did discharge lactation teaching and reviewed the Mother's Breastfeeding Guide. LC answered all questions. Mother has  phone number  for questions after DC.   Mother may refer to the After Visit Summary for lactation instructions.     Baby seems to be doing better than yesterday. Still needs shield to latch but baby is doing more sucking today. Mother continues on the plan of attempt to nurse, pump and supplement with EBM/ABM using feeding techniques shown by the SLP. Using a Dr.Brown's bottle given by SLP to feed baby. Baby has increased in volume of feeds from 9 to 15ml. Mother is also getting more when she pumps.    Will follow up with SLP on an outpt basis and continue exercises shown by SLP.

## 2020-10-21 ENCOUNTER — TELEPHONE (OUTPATIENT)
Dept: LACTATION | Facility: CLINIC | Age: 30
End: 2020-10-21

## 2020-10-21 ENCOUNTER — TELEPHONE (OUTPATIENT)
Dept: OBSTETRICS AND GYNECOLOGY | Facility: HOSPITAL | Age: 30
End: 2020-10-21

## 2020-10-21 NOTE — TELEPHONE ENCOUNTER
Follow up phone call. Phone call to voice mail . LC unable to leave message , states that voice mailbox is full.

## 2020-10-22 NOTE — TELEPHONE ENCOUNTER
Mild headache started this morning and progressively worsening. Now feeling achey all over. Reports checked her blood pressure and it is elevated (with a repeat as well): B/P 170/90    Discussed this is severe range blood pressure and concerns for postpartum preeclampsia. Discussed risks to this of seizure, stroke, and death and recommend she present to the ED as soon as possible for further eval.    Pt verbalizes understanding and in agreement. She lives in [place], [place] and considering presenting to the hospital there.

## 2020-10-26 ENCOUNTER — PATIENT MESSAGE (OUTPATIENT)
Dept: OBSTETRICS AND GYNECOLOGY | Facility: CLINIC | Age: 30
End: 2020-10-26

## 2020-10-29 ENCOUNTER — TELEPHONE (OUTPATIENT)
Dept: OBSTETRICS AND GYNECOLOGY | Facility: CLINIC | Age: 30
End: 2020-10-29

## 2020-10-29 ENCOUNTER — PATIENT MESSAGE (OUTPATIENT)
Dept: OBSTETRICS AND GYNECOLOGY | Facility: CLINIC | Age: 30
End: 2020-10-29

## 2020-10-29 NOTE — TELEPHONE ENCOUNTER
Returned call to [place] spoke with Natalie about authorizing a breast pump for pt.  Natalie was provided fax number to send authorizations to         ----- Message from Celsa Plata sent at 10/29/2020 12:25 PM CDT -----  Regarding: advice  Contact: [facility] [employer]/[phone]  Type: Needs Medical Advice  Who Called:  [facility] [employer]/[place]/[phone]    Pharmacy name and phone #:    [id number] - [place], [place] - [address] AT [facility]  [address]  [place] [address]  Phone: [phone] Fax: [phone]       Additional Information: Can they send DWO to office for a breast pump for the doctor to sign. Please call to advise.

## 2020-11-09 ENCOUNTER — OFFICE VISIT (OUTPATIENT)
Dept: OBSTETRICS AND GYNECOLOGY | Facility: CLINIC | Age: 30
End: 2020-11-09
Payer: MEDICAID

## 2020-11-09 ENCOUNTER — PATIENT MESSAGE (OUTPATIENT)
Dept: OBSTETRICS AND GYNECOLOGY | Facility: CLINIC | Age: 30
End: 2020-11-09

## 2020-11-09 ENCOUNTER — TELEPHONE (OUTPATIENT)
Dept: OBSTETRICS AND GYNECOLOGY | Facility: HOSPITAL | Age: 30
End: 2020-11-09

## 2020-11-09 VITALS — SYSTOLIC BLOOD PRESSURE: 102 MMHG | DIASTOLIC BLOOD PRESSURE: 80 MMHG

## 2020-11-09 DIAGNOSIS — B37.89 YEAST INFECTION OF NIPPLE, POSTPARTUM: Primary | ICD-10-CM

## 2020-11-09 DIAGNOSIS — K60.2 ANAL FISSURE: ICD-10-CM

## 2020-11-09 PROCEDURE — 99999 PR PBB SHADOW E&M-EST. PATIENT-LVL II: ICD-10-PCS | Mod: PBBFAC,,, | Performed by: ADVANCED PRACTICE MIDWIFE

## 2020-11-09 PROCEDURE — 99999 PR PBB SHADOW E&M-EST. PATIENT-LVL II: CPT | Mod: PBBFAC,,, | Performed by: ADVANCED PRACTICE MIDWIFE

## 2020-11-09 PROCEDURE — 0503F POSTPARTUM CARE VISIT: CPT | Mod: ,,, | Performed by: ADVANCED PRACTICE MIDWIFE

## 2020-11-09 PROCEDURE — 99212 OFFICE O/P EST SF 10 MIN: CPT | Mod: PBBFAC | Performed by: ADVANCED PRACTICE MIDWIFE

## 2020-11-09 PROCEDURE — 0503F PR POSTPARTUM CARE VISIT: ICD-10-PCS | Mod: ,,, | Performed by: ADVANCED PRACTICE MIDWIFE

## 2020-11-09 RX ORDER — LIDOCAINE HYDROCHLORIDE 20 MG/ML
JELLY TOPICAL
Qty: 30 ML | Refills: 1 | Status: SHIPPED | OUTPATIENT
Start: 2020-11-09 | End: 2020-12-02

## 2020-11-09 RX ORDER — CLOTRIMAZOLE 1 %
CREAM (GRAM) TOPICAL 2 TIMES DAILY
Qty: 45 G | Refills: 1 | Status: SHIPPED | OUTPATIENT
Start: 2020-11-09 | End: 2022-05-26

## 2020-11-09 NOTE — TELEPHONE ENCOUNTER
"Called Catie to discuss concerns. She states baby has thrush and she has redness on both nipples and likely needs treatment for yeast.   She also states she has been so focused on baby due to "lots of issues they have had with her" and now she has had time to focus on herself and she is concerned about her stiches and feels it might be "dehisced".  She is stressed, but lives an hour away so unsure about when she can come in .  She denies fever, severe perineal pain, malodorous discharge or heavy vaginal bleeding.  Plan to call out treatment for yeast and newmans nipple cream for future prevention.  She will message when she can come in this week (recommended being examined).   Warning s/s reviewed.    Rajani Barker CNM, MSN  11/09/2020  11:55 AM        ----- Message from Joan Phillips RN sent at 11/9/2020 10:12 AM CST -----  Contact: CATIE BARBOZA [[id number]]    ----- Message -----  From: Nida Miner  Sent: 11/9/2020   9:33 AM CST  To: [facility] Staff    Type: Patient Call Back    Who called: CATIE BARBOZA [[id number]]    What is the request in detail: She states that she thinks she has thrash of the breast. She states that she also has some discomfort of the vaginal.     Can the clinic reply by [facility]? No    Would the patient rather a call back or a response via [facility]? Call back     Best call back number: [phone] (mobile)    Additional Information:           "

## 2020-11-09 NOTE — PROGRESS NOTES
Catie Pendleton is a 30 y.o.  is here for a postpartum problem visit.  Estimated Date of Delivery: 10/9/20   DELIVERY HISTORY  NSVD2 delivery on 10/17/20 at 41GA, female infant  First degree laceration with repair. Breasetfeeding infant. Called this morning with concerns about possible nipple yeast and wound issues of perineum and advised to come in to be examined.     C/C: Postpartum exam. Pain and possible dehisced laceration of perineum or stitches out early? Patient is worried.    Pregnancy was c/b by:  Patient Active Problem List   Diagnosis    Uterine fibroid    Migraine without aura, not intractable, without status migrainosus    Pregnancy    Vulvar varices during pregnancy     (normal spontaneous vaginal delivery)    Lactating mother         HPI:  Doing well ; ambulating and tolerating regular diet  Lochia: scant rubra   Vaginal pain: yes noticed over past couple of days, denies odorous discharge.  Abdominal pain: denies   Breasts/nipples: doing well no complaints  Bowel/bladder function: severe pain with pooping x 1 mild bleeding  Depression/anxiety: doing well ; EPDS score: low score  Support at home: adequate/ good support  Contraception: considering unsure    Last pap smear done:  unsure    Past Medical History:   Diagnosis Date    Eosinophilic fasciitis     When she was 11 for about 2 years. 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    Migraine      Past Surgical History:   Procedure Laterality Date    OVARIAN CYST REMOVAL Right      Review of patient's allergies indicates:   Allergen Reactions    Clindamycin Itching and Rash    Morphine Rash    Penicillins Hives and Rash     She was a baby and was told that she was allergic.    Sulfa (sulfonamide antibiotics) Rash       Current Outpatient Medications:     clotrimazole (LOTRIMIN) 1 % cream, Apply topically 2 (two) times daily., Disp: 45 g, Rfl: 1    cici perez ointment, Apply topically 3 (three) times daily. Apply after feeding. Do  not wash off. This compounded medication expires in 30 days., Disp: 30 g, Rfl: 1    prenatal vit/iron fum/folic ac (PRENATAL 1+1 ORAL), , Disp: , Rfl:     PE:  OB History    Para Term  AB Living   3 2 2   1 2   SAB TAB Ectopic Multiple Live Births   1     0 2      # Outcome Date GA Lbr Tavares/2nd Weight Sex Delivery Anes PTL Lv   3 Term 10/17/20 41w1d  3.884 kg (8 lb 9 oz) F Vag-Spont EPI N MATHEUS   2 Term  41w0d  3.969 kg (8 lb 12 oz)  Vag-Spont   MATHEUS   1 SAB               There were no vitals filed for this visit.  LMP 2020   Gen: A&O x 4, NAD  Neck: non-tender, not enlarged, no masses or nodules  CV: normal HR  Lungs: normal resp effort  Breasts: lacatating , bilaterally:  no masses, non-tender, nipples intact  Abdomen: soft, nontender, and nondistended, minimal diastasis recti   Vulva/Vagina: healed well, approximated, tender at site of suture pulling where suture still in place pulling, therefore clipped suture, healing well, no swelling, or abnormal healing noted, no signs of infection, lochia noted.  Speculum exam deferred.  Rectal Exam reveals 12 oclock fissure---called out rectal rockets and lidocaine jelly (to start stool softeners) (discused other treatment options including nitroglycerine)    ASSESSMENT:  1. Anal fissure     Normal pp healing of perineum/ epis site  3 wk problem postpartum exam s/p  delivery  Birth control counseling  Breast Feeding      PLAN:  Anal fissure    Other orders  -     Discontinue: lidocaine HCL 2% (XYLOCAINE) 2 % jelly; Apply to affected area daily as needed  Dispense: 30 mL; Refill: 1    Routine pp visit-perineal healing normal  Counseling regarding resuming normal activities of exercise and work.  Postpartum precautions reviewed.  Continue PNV while breastfeeding.  Contraceptive counseling discussed briefly.     Meds see below  Continue annual exams; return then or sooner if any symptoms of pp depression or any other problem.   Return for 4-6 wk  postpartum visit as previously scheduled.    UPDATED MED LIST:  Current Outpatient Medications   Medication Sig Dispense Refill    clotrimazole (LOTRIMIN) 1 % cream Apply topically 2 (two) times daily. 45 g 1    cici perez ointment Apply topically 3 (three) times daily. Apply after feeding. Do not wash off. This compounded medication expires in 30 days. 30 g 1    prenatal vit/iron fum/folic ac (PRENATAL 1+1 ORAL)        No current facility-administered medications for this visit.        Rajani Barker CNM, MSN  11/09/2020  3:02 PM

## 2020-11-10 ENCOUNTER — PATIENT MESSAGE (OUTPATIENT)
Dept: OBSTETRICS AND GYNECOLOGY | Facility: CLINIC | Age: 30
End: 2020-11-10

## 2020-11-10 PROBLEM — A18.4: Status: ACTIVE | Noted: 2020-11-04

## 2020-11-10 RX ORDER — DICLOFENAC SODIUM 10 MG/G
GEL TOPICAL
COMMUNITY
Start: 2020-11-03 | End: 2020-12-02

## 2020-11-17 DIAGNOSIS — B37.89 CANDIDIASIS OF BREAST: Primary | ICD-10-CM

## 2020-11-17 RX ORDER — FLUCONAZOLE 100 MG/1
TABLET ORAL
Qty: 15 TABLET | Refills: 0 | Status: SHIPPED | OUTPATIENT
Start: 2020-11-17 | End: 2020-12-01

## 2020-12-02 ENCOUNTER — POSTPARTUM VISIT (OUTPATIENT)
Dept: OBSTETRICS AND GYNECOLOGY | Facility: CLINIC | Age: 30
End: 2020-12-02
Payer: MEDICAID

## 2020-12-02 VITALS
DIASTOLIC BLOOD PRESSURE: 72 MMHG | SYSTOLIC BLOOD PRESSURE: 104 MMHG | WEIGHT: 139.25 LBS | BODY MASS INDEX: 25.46 KG/M2

## 2020-12-02 PROCEDURE — 99212 OFFICE O/P EST SF 10 MIN: CPT | Mod: PBBFAC | Performed by: ADVANCED PRACTICE MIDWIFE

## 2020-12-02 PROCEDURE — 99999 PR PBB SHADOW E&M-EST. PATIENT-LVL II: ICD-10-PCS | Mod: PBBFAC,,, | Performed by: ADVANCED PRACTICE MIDWIFE

## 2020-12-02 PROCEDURE — 59430 PR CARE AFTER DELIVERY ONLY: ICD-10-PCS | Mod: ,,, | Performed by: ADVANCED PRACTICE MIDWIFE

## 2020-12-02 PROCEDURE — 99999 PR PBB SHADOW E&M-EST. PATIENT-LVL II: CPT | Mod: PBBFAC,,, | Performed by: ADVANCED PRACTICE MIDWIFE

## 2020-12-02 NOTE — PROGRESS NOTES
"Catie Pendleton is a 30 y.o.  is here for a postpartum visit.  Estimated Date of Delivery: 10/9/20     DELIVERY HISTORY  Spontaneous vaginal delivery on 10/17/20 at 41w1d gestation, female infant "Shavon", weight 3884g, 1st degree laceration with repair. Breast feeding infant.     She reports that she is still taking meds for breast candidiasis. She states that the "deep" breast pain that she had when she started the medication has greatly improved, and she now only notices mild discomfort - right breast worse than left. Yesterday had some pain with latching but not today. Still has some pills left. Will call us if discomfort continues.    She reports some lower back pain. --- Anesthesia mentioned possible lumbar scoliosis (when they evaluated her while she was in labor). She plans to see a chiropractor for this.    C/C: Postpartum exam.    Pregnancy was c/b by:  Patient Active Problem List   Diagnosis    Uterine fibroid    Migraine without aura, not intractable, without status migrainosus    Pregnancy    Vulvar varices during pregnancy     (normal spontaneous vaginal delivery)    Lactating mother    TB skin/subcutaneous    Anal fissure       HPI:  Doing well; ambulating and tolerating regular diet  Lochia: None for the past 4-5 days  Vaginal pain: None  Abdominal pain: None   Breasts/nipples: Breastfeeding well without difficulty and infant is gaining appropriately per their pediatrician. Had tongue tie revision. [facility]. Neonatologist who is also a LC.  Bowel/bladder function: Both okay  Depression/anxiety: Denies ; EPDS score: 7  Support at home: Yes  Contraception: considering none. Discussed safe pregnancy spacing.    Pap hx: 2019 neg cytology. Never any abnormal paps or STDs. Next pap due no later than 2022.    Past Medical History:   Diagnosis Date    Eosinophilic fasciitis     When she was 11 for about 2 years. 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    Migraine  "     Past Surgical History:   Procedure Laterality Date    OVARIAN CYST REMOVAL Right      Review of patient's allergies indicates:   Allergen Reactions    Clindamycin Itching and Rash    Morphine Rash    Penicillins Hives and Rash     She was a baby and was told that she was allergic.    Sulfa (sulfonamide antibiotics) Rash       Current Outpatient Medications:     clotrimazole (LOTRIMIN) 1 % cream, Apply topically 2 (two) times daily., Disp: 45 g, Rfl: 1    cici perez ointment, Apply topically 3 (three) times daily. Apply after feeding. Do not wash off. This compounded medication expires in 30 days., Disp: 30 g, Rfl: 1    prenatal vit/iron fum/folic ac (PRENATAL 1+1 ORAL), , Disp: , Rfl:     PE:  OB History    Para Term  AB Living   3 2 2   1 2   SAB TAB Ectopic Multiple Live Births   1     0 2      # Outcome Date GA Lbr Tavares/2nd Weight Sex Delivery Anes PTL Lv   3 Term 10/17/20 41w1d  3.884 kg (8 lb 9 oz) F Vag-Spont EPI N MATHEUS   2 Term  41w0d  3.969 kg (8 lb 12 oz)  Vag-Spont   MATHEUS   1 SAB               Vitals:    20 0845   BP: 104/72     /72   Wt 63.2 kg (139 lb 3.5 oz)   LMP 2020   Breastfeeding Yes Comment: exclusively   BMI 25.46 kg/m²   Gen: A&O x 4, NAD  Neck: non-tender, not enlarged, no masses or nodules  CV: normal HR  Lungs: normal resp effort  Breasts: lactating, bilaterally: no masses, non-tender, nipples intact/pink  Abdomen: soft, nontender, and nondistended, no diastasis recti, fundus appropriately involuted  Vulva/Vagina: healed well, approximated, non-tender.    ASSESSMENT/PLAN:  Postpartum exam s/p spontaneous vaginal delivery  -- Counseling regarding resuming normal activities of exercise and work.  -- Postpartum precautions reviewed  -- Reviewed pap guidelines.  Continue annual exams; return then or sooner if any symptoms of pp depression or any other problem.    Birth control counseling  ---Patient does not desire birth control. Reviewed safe  pregnancy spacing.    Breast Feeding  -- Continue PNV while breastfeeding.    No hx GDM    Fam hx of breast CA  --Paternal aunt has breast cancer (Age 67 at diagnosis)  --Routine screening recommended      UPDATED MED LIST:  Current Outpatient Medications   Medication Sig Dispense Refill    clotrimazole (LOTRIMIN) 1 % cream Apply topically 2 (two) times daily. 45 g 1    cici perez ointment Apply topically 3 (three) times daily. Apply after feeding. Do not wash off. This compounded medication expires in 30 days. 30 g 1    prenatal vit/iron fum/folic ac (PRENATAL 1+1 ORAL)        No current facility-administered medications for this visit.        Patient was counseled today on Pap guidelines, recommendation for pelvic exams, mammograms starting at the age of 40, and to see her PCP for other health maintenance.

## 2020-12-09 PROBLEM — K60.2 ANAL FISSURE: Status: ACTIVE | Noted: 2020-12-09

## 2020-12-10 PROBLEM — Z34.90 PREGNANCY: Status: RESOLVED | Noted: 2020-07-05 | Resolved: 2020-12-10

## 2021-03-24 ENCOUNTER — PATIENT MESSAGE (OUTPATIENT)
Dept: OBSTETRICS AND GYNECOLOGY | Facility: CLINIC | Age: 31
End: 2021-03-24

## 2021-03-25 DIAGNOSIS — B37.89 CANDIDIASIS OF BREAST: Primary | ICD-10-CM

## 2021-03-25 RX ORDER — DOXYLAMINE SUCCINATE 25 MG
TABLET ORAL 2 TIMES DAILY
Qty: 42.5 G | Refills: 0 | Status: SHIPPED | OUTPATIENT
Start: 2021-03-25 | End: 2022-05-26

## 2021-04-13 ENCOUNTER — PATIENT MESSAGE (OUTPATIENT)
Dept: OBSTETRICS AND GYNECOLOGY | Facility: CLINIC | Age: 31
End: 2021-04-13

## 2021-04-13 DIAGNOSIS — B37.89 YEAST INFECTION OF NIPPLE, POSTPARTUM: Primary | ICD-10-CM

## 2021-04-13 RX ORDER — FLUCONAZOLE 200 MG/1
200 TABLET ORAL DAILY
Qty: 14 TABLET | Refills: 0 | Status: SHIPPED | OUTPATIENT
Start: 2021-04-13 | End: 2021-04-27

## 2021-10-25 DIAGNOSIS — M25.512 ACUTE PAIN OF LEFT SHOULDER: Primary | ICD-10-CM

## 2021-10-25 PROCEDURE — 73030 XR SHOULDER COMPLETE 2 OR MORE VIEWS LEFT: ICD-10-PCS | Mod: LT,S$GLB,, | Performed by: RADIOLOGY

## 2021-10-25 PROCEDURE — 73030 X-RAY EXAM OF SHOULDER: CPT | Mod: LT,S$GLB,, | Performed by: RADIOLOGY

## 2021-12-19 ENCOUNTER — IMMUNIZATION (OUTPATIENT)
Dept: URGENT CARE | Facility: CLINIC | Age: 31
End: 2021-12-19
Payer: MEDICAID

## 2021-12-19 PROCEDURE — 90471 IMMUNIZATION ADMIN: CPT | Mod: EP,S$GLB,, | Performed by: EMERGENCY MEDICINE

## 2021-12-19 PROCEDURE — 90682 RIV4 VACC RECOMBINANT DNA IM: CPT | Mod: EP,S$GLB,, | Performed by: EMERGENCY MEDICINE

## 2021-12-19 PROCEDURE — 90471 FLU VACCINE - QUADRIVALENT (RECOMBINANT) PRESERVATIVE FREE: ICD-10-PCS | Mod: EP,S$GLB,, | Performed by: EMERGENCY MEDICINE

## 2021-12-19 PROCEDURE — 90682 FLU VACCINE - QUADRIVALENT (RECOMBINANT) PRESERVATIVE FREE: ICD-10-PCS | Mod: EP,S$GLB,, | Performed by: EMERGENCY MEDICINE

## 2022-01-07 ENCOUNTER — CLINICAL SUPPORT (OUTPATIENT)
Dept: URGENT CARE | Facility: CLINIC | Age: 32
End: 2022-01-07

## 2022-01-07 PROCEDURE — 86580 PR  TB INTRADERMAL TEST: ICD-10-PCS | Mod: S$GLB,,, | Performed by: EMERGENCY MEDICINE

## 2022-01-07 PROCEDURE — 86580 TB INTRADERMAL TEST: CPT | Mod: S$GLB,,, | Performed by: EMERGENCY MEDICINE

## 2022-05-03 ENCOUNTER — PATIENT MESSAGE (OUTPATIENT)
Dept: OBSTETRICS AND GYNECOLOGY | Facility: CLINIC | Age: 32
End: 2022-05-03
Payer: MEDICAID

## 2022-05-03 DIAGNOSIS — R11.0 NAUSEA: Primary | ICD-10-CM

## 2022-05-04 RX ORDER — DOXYLAMINE SUCCINATE AND PYRIDOXINE HYDROCHLORIDE, DELAYED RELEASE TABLETS 10 MG/10 MG 10; 10 MG/1; MG/1
TABLET, DELAYED RELEASE ORAL
Qty: 120 TABLET | Refills: 1 | Status: SHIPPED | OUTPATIENT
Start: 2022-05-04 | End: 2022-10-17

## 2022-05-05 ENCOUNTER — PATIENT MESSAGE (OUTPATIENT)
Dept: OBSTETRICS AND GYNECOLOGY | Facility: CLINIC | Age: 32
End: 2022-05-05
Payer: MEDICAID

## 2022-05-15 ENCOUNTER — PATIENT MESSAGE (OUTPATIENT)
Dept: OBSTETRICS AND GYNECOLOGY | Facility: CLINIC | Age: 32
End: 2022-05-15
Payer: MEDICAID

## 2022-05-24 PROBLEM — O98.511 COVID-19 AFFECTING PREGNANCY IN FIRST TRIMESTER: Status: ACTIVE | Noted: 2022-05-24

## 2022-05-24 PROBLEM — U07.1 COVID-19 AFFECTING PREGNANCY IN FIRST TRIMESTER: Status: ACTIVE | Noted: 2022-05-24

## 2022-05-24 NOTE — PROGRESS NOTES
Catie Pendleton is a 32 y.o. , presents today for amenorrhea.      C/C: amenorrhea  She has not seen any other provider for this pregnancy    HPI: Reports amenorrhea since Patient's last menstrual period was 2022. <That LMP is an estimate. Prior to LMP, menses were irregular (is currently breastfeeding). She is not currently on any contraception. + UPT on . Also reports nausea and vomiting but B6 and Unisom has been helping. Has not noticed breast tenderness. Denies vaginal bleeding since LMP. She states that she thinks she's somewhere between 9 and 10 weeks pregnant.    Meds: PNV, Unisom and B6    SOCIAL HISTORY: Denies emotional/mental/physical/sexual violence or abuse. Feels safe at home.  She is in between insurances right now. She currently has medicaid but is phasing out of that because she and her  now make too much money to qualify. She will either be on her 's work insurance or he will be on hers - they are deciding. This was a surprise pregnancy -- they are happy about it, but they did not plan to be in this insurance situation. She states that she can afford to pay out of pocket for any necessary tests, but would like to wait until her insurance kicks in for things that are not urgent please.    PAP HISTORY: Catie states that she had a pap in either  or  at an outside facility in [place]. She agrees to have pap and HPV cotesting done today. Denies hx of abnormal pap or STD.    Review of patient's allergies indicates:   Allergen Reactions    Clindamycin Itching and Rash    Morphine Rash    Penicillins Hives and Rash     She was a baby and was told that she was allergic.    Sulfa (sulfonamide antibiotics) Rash     Past Medical History:   Diagnosis Date    Anal fissure 2020    Given suppositories (anusol to start)--f/u at 6week visit. Stool habits discussed    Eosinophilic fasciitis     When she was 11 for about 2 years. Pt reports it is in  remission.    Migraine      Past Surgical History:   Procedure Laterality Date    OVARIAN CYST REMOVAL Right      Past Surgical History:   Procedure Laterality Date    OVARIAN CYST REMOVAL Right      OB History    Para Term  AB Living   4 2 2   1 2   SAB IAB Ectopic Multiple Live Births   1     0 2      # Outcome Date GA Lbr Tavares/2nd Weight Sex Delivery Anes PTL Lv   4 Current            3 Term 10/17/20 41w1d  3.884 kg (8 lb 9 oz) F Vag-Spont EPI N MATHEUS   2 SAB 2019           1 Term 17 41w0d  3.969 kg (8 lb 12 oz) F Vag-Spont   MATHEUS     OB History        4    Para   2    Term   2            AB   1    Living   2       SAB   1    IAB        Ectopic        Multiple   0    Live Births   2               Social History     Socioeconomic History    Marital status:      Spouse name: Duy   Tobacco Use    Smoking status: Never Smoker    Smokeless tobacco: Never Used   Substance and Sexual Activity    Alcohol use: Not Currently    Drug use: Never    Sexual activity: Yes     Partners: Male     Birth control/protection: None     Comment: Duy   Social History Narrative    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 CLAU Gordillo works at [employer].        This will be Catie and Duy's third baby together!        No cats at home.     Family History   Problem Relation Age of Onset    Colon polyps Mother         Precancerous - Were removed    Hypertension Mother     Hyperlipidemia Mother     Patel's esophagus Mother     Hypertension Father     Hyperlipidemia Father     Diabetes Father     Melanoma Father     Lung cancer Maternal Grandfather     Diabetes Paternal Grandmother     Emphysema Paternal Grandfather         Was a smoker    Skin cancer Paternal Grandfather         Not sure which kind of skin cancer    Breast cancer Paternal Aunt 67    Ovarian cancer Neg Hx      Social History     Substance and Sexual Activity   Sexual  Activity Yes    Partners: Male    Birth control/protection: None    Comment: Duy       GENETIC SCREENING   Patient's age 35 years or older as of estimated date of delivery? n  Neural tube defect (meningomyelocele, spina bifida, or anencephaly)? n  Down syndrome? n  Elian-Sachs (Ashkenazi Anabaptism, Cajun, Comoran Gibraltarian)? n  Canavan disease (Ashkenazi Anabaptism)? n  Familial dysautonomia (Ashkenazi Anabaptism)? n  Sickle cell disease or trait ()? n  Hemophilia or other blood disorders? n  Cystic fibrosis? n  Muscular dystrophy? n  Prichard's chorea? n  Thalassemia (Italian, Greek, Mediterranean, or  background) MCV less than 80? n  Congenital heart defect? n  Mental retardation/autism? n   If Yes, was person tested for Fragile X? n/a  Other inherited genetic or chromosomal disorder? n  Maternal metabolic disorder (e.g. type 1 diabetes, PKU)? n  Patient or baby's father had a child with birth defects not listed above? n  Recurrent pregnancy loss or a stillbirth: n  Medications (including supplements, vitamins, herbs or OTC drugs)/illicit/recreational drugs/alcohol since last menstrual period? PNV, unisom and B6  List any other genetic risks: n/a  Comments/counseling: Catie denies any family history of genetic anomalies    INFECTION HISTORY  Live with someone with TB or exposed to TB: n  Patient or partner has history of genital herpes: n  Rash or viral illness since last menstrual period: Yes, had COVID in first trimester  Patient or partner has hepatitis B or C: n  History of STD, gonorrhea, chlamydia, HPV, HIV, syphilis (list all that apply): n  List other infections: n/a  Additional comments:  Catie denies any history of STDs.  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 She has not had a VZV outbreak.        ROS:  Constitutional/Gen: Denies fevers, chills, malaise, or  "weight loss.  Psych: Denies depression, anxiety  Eyes: Denies changes in vision or scotomata  Ears, nose, mouth, throat: Denies sinus tenderness, swelling, or dentition problems  CV/vasc: Denies heart palpitations or edema  Resp: Denies SOB or dyspnea  Breasts: Denies mass, nipple discharge, or trauma.  GI: Denies constipation, diarrhea. Reports nausea and vomiting, but unisom and B6 has been helping  : Denies vaginal discharge, dysuria or pelvic pain.  MS: Denies weakness, soreness, or changes in ROM    OBJECTIVE:  /79   Ht 5' 2" (1.575 m)   Wt 66.2 kg (145 lb 15.1 oz)   LMP 2022   BMI 26.69 kg/m²   Constitutional/Gen: NAD, appears stated age, well groomed  Neck: supple, no masses or enlargement  Head: normocephalic  Skin: warm and dry w/o rash  Lung: normal resp effort, CTAB  Heart: normal HR, RRR   Back: negative CVAT  Breasts: bilaterally--no masses, tenderness, skin changes, or nipple discharge noted  Abdomen: soft, nontender, no masses, and bowel sounds normal, no enlargement  External genitalia: no lesions or discharge, normal hair distribution  Urethral meatus: normal size and location, no lesions or prolapse  Vagina: normal appearance, no lesions, no discharge, no evidence cystocele or rectocele.  Cervix: normal appearance, no discharge, no lesions, negative CMT  Uterus: nontender, mobile, approx 9 week size, contour, and position.  Adnexa: no masses or tenderness  Anus/Perineum: normal appearance, with no lesions or discharge. Internal exam deferred.  Extremities: FROM, with no edema or tenderness.  Neurologic: A&O x 4, non-focal, cranial nerves 2-12 grossly intact  Psych: affect appropriate and without signs of mood, thought or memory difficulty appreciated    UPT positive in office    ASSESSMENT:  32 y.o. female  with amenorrhea  Likely at 9w2d via approximate LMP; S=D  Body mass index is 26.69 kg/m².  Patient Active Problem List   Diagnosis    Intramural leiomyoma of uterus    " Migraine without aura, not intractable, without status migrainosus    Vulvar varices during pregnancy    Lactating mother    COVID-19 affecting pregnancy in first trimester    Exposure to varicella zoster virus (VZV)       PLAN:  1. Amenorrhea  -- + UPT in office, Patient's last menstrual period was 03/21/2022. --> Estimated Date of Delivery: 12/26/2022  -- Dating US ordered  -- Routine serum and urine prenatal labs orders placed. GC to be collected at a future visit -- Catie did a clean catch urine at this visit so it was used for urine culture.    2. Physical exam today. Discussed ASCCP guidelines. Pap collected today.    3. BMI  -- Discussed IOM recommended weight gain of:   Weight category Pre pre BMI  Recommended TWG   Underweight Less than 18.5 28-40    Normal Weight 18.5-24.9  25-35    Overweight 25-29.9  15-25    Obese   30 and greater  11-20   -- Discussed criteria for delivery at [facility] r/t excessive pre-preg weight or excessive weight gain:   Pre-pregnancy BMI over 40 or excess pregnancy weight gain defined as:   Pre-preg BMI < 18.5; Excess weight gain = > 60 pound   Pre-preg BMI 18.5-24.9;  Excess weight gain = > 53 pounds   Pre-preg BMI 25-29.9;  Excess weight gain = > 38 pounds   Pre-preg BMI > 30;  Excess weight gain = > 30 pounds    4. Discussed nausea and vomiting in pregnancy  -- Education regarding lifestyle and dietary modifications  -- Reviewed use of B6/Unisom prn. Pt will notify us if no relief/worsening symptoms, will consider alternative therapies prn    5. Pregnancy education and couseling; handouts and booklet provided  -- She was cared for by the midwives at [facility] in a previous pregnancy and is familiar with the practice  -- Oriented to practice and anticipated prenatal course of care and how to contact us  -- Reviewed [facility] guidelines and admission, exclusion, and transfer criteria  -- Precautions/warning signs reviewed  -- Common complaints of pregnancy  -- Routine prenatal  labs including HIV  -- Ultrasounds  -- Childbirth education/hospital/[facility] facilities  -- Nutrition, prepregnant BMI, and recommended weight gain  -- Toxoplasmosis precautions (Cats/Raw Meat)  -- Sexual activity and exercise  -- Environmental/Work hazards  -- Travel  -- Tobacco (Ask, Advise, Assess, Assist, and Arrange), as well as alcohol and drug use  -- Use of any medications (Including supplements, Vitamins, Herbs, or OTC Drugs)  -- Domestic violence screen negative    6. Reviewed genetic testing options. Reviewed available first trimester and/or second  trimester screening options. Reviewed risk of false positive/negative results and recommendation of referral to [facility] in event of a positive result, for NIPT, US, and/or amniocentesis. Reviewed the possible estimated 1 in 300/500 risk of miscarriage with amniocentesis, even with a healthy fetus. Patient desires MT21. Gave her the phone number to call to check for insurance coverage. Staff message sent to RAJEDNRA Pedro and DEANGELO Degroot requesting for a MT21 form to be filled out and waiting for Catie to  from the midwife clinic  when she is at least 10 weeks pregnant.    7. History of vulvar varicosities  --Has had this with each of her pregnancies and doesn't have them yet with current pregnancy  --Discussed support garments and sent info via patient portal message    8. 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 She has not had a VZV outbreak.    9. Reviewed warning signs, precautions, and how/when to contact us.     10. RTC x 4 weeks, call or present sooner prn.          60 minutes of total time spent on the encounter, which includes face to face time and non-face to face time preparing to see the patient (eg, review of tests), Obtaining and/or reviewing separately obtained history,  Documenting clinical information in the electronic or other health record, Independently interpreting results (not separately reported) and communicating results to the patient/family/caregiver, or Care coordination (not separately reported).

## 2022-05-25 ENCOUNTER — OFFICE VISIT (OUTPATIENT)
Dept: OBSTETRICS AND GYNECOLOGY | Facility: CLINIC | Age: 32
End: 2022-05-25
Payer: MEDICAID

## 2022-05-25 VITALS
DIASTOLIC BLOOD PRESSURE: 79 MMHG | BODY MASS INDEX: 26.86 KG/M2 | SYSTOLIC BLOOD PRESSURE: 115 MMHG | HEIGHT: 62 IN | WEIGHT: 145.94 LBS

## 2022-05-25 DIAGNOSIS — U07.1 COVID-19 AFFECTING PREGNANCY IN FIRST TRIMESTER: ICD-10-CM

## 2022-05-25 DIAGNOSIS — O98.511 COVID-19 AFFECTING PREGNANCY IN FIRST TRIMESTER: ICD-10-CM

## 2022-05-25 DIAGNOSIS — Z13.9 RISK AND FUNCTIONAL ASSESSMENT: ICD-10-CM

## 2022-05-25 DIAGNOSIS — Z20.820 EXPOSURE TO VARICELLA ZOSTER VIRUS (VZV): ICD-10-CM

## 2022-05-25 DIAGNOSIS — D25.9 UTERINE LEIOMYOMA, UNSPECIFIED LOCATION: ICD-10-CM

## 2022-05-25 DIAGNOSIS — O22.10 VULVAR VARICES DURING PREGNANCY: ICD-10-CM

## 2022-05-25 DIAGNOSIS — N91.2 AMENORRHEA: Primary | ICD-10-CM

## 2022-05-25 DIAGNOSIS — Z34.90 PREGNANCY, UNSPECIFIED GESTATIONAL AGE: ICD-10-CM

## 2022-05-25 DIAGNOSIS — G43.009 MIGRAINE WITHOUT AURA, NOT INTRACTABLE, WITHOUT STATUS MIGRAINOSUS: ICD-10-CM

## 2022-05-25 PROBLEM — K60.2 ANAL FISSURE: Status: RESOLVED | Noted: 2020-12-09 | Resolved: 2022-05-25

## 2022-05-25 PROBLEM — A18.4: Status: RESOLVED | Noted: 2020-11-04 | Resolved: 2022-05-25

## 2022-05-25 PROCEDURE — 99999 PR PBB SHADOW E&M-EST. PATIENT-LVL III: CPT | Mod: PBBFAC,,, | Performed by: ADVANCED PRACTICE MIDWIFE

## 2022-05-25 PROCEDURE — 87086 URINE CULTURE/COLONY COUNT: CPT | Performed by: ADVANCED PRACTICE MIDWIFE

## 2022-05-25 PROCEDURE — 88175 CYTOPATH C/V AUTO FLUID REDO: CPT | Performed by: ADVANCED PRACTICE MIDWIFE

## 2022-05-25 PROCEDURE — 99999 PR PBB SHADOW E&M-EST. PATIENT-LVL III: ICD-10-PCS | Mod: PBBFAC,,, | Performed by: ADVANCED PRACTICE MIDWIFE

## 2022-05-25 PROCEDURE — 99213 OFFICE O/P EST LOW 20 MIN: CPT | Mod: PBBFAC,TH | Performed by: ADVANCED PRACTICE MIDWIFE

## 2022-05-25 PROCEDURE — 87624 HPV HI-RISK TYP POOLED RSLT: CPT | Performed by: ADVANCED PRACTICE MIDWIFE

## 2022-05-25 PROCEDURE — 99215 OFFICE O/P EST HI 40 MIN: CPT | Mod: S$PBB,TH,, | Performed by: ADVANCED PRACTICE MIDWIFE

## 2022-05-25 PROCEDURE — 99215 PR OFFICE/OUTPT VISIT, EST, LEVL V, 40-54 MIN: ICD-10-PCS | Mod: S$PBB,TH,, | Performed by: ADVANCED PRACTICE MIDWIFE

## 2022-05-26 ENCOUNTER — HOSPITAL ENCOUNTER (OUTPATIENT)
Dept: RADIOLOGY | Facility: HOSPITAL | Age: 32
Discharge: HOME | End: 2022-05-26
Attending: ADVANCED PRACTICE MIDWIFE

## 2022-05-26 ENCOUNTER — PATIENT MESSAGE (OUTPATIENT)
Dept: OBSTETRICS AND GYNECOLOGY | Facility: CLINIC | Age: 32
End: 2022-05-26
Payer: MEDICAID

## 2022-05-26 DIAGNOSIS — Z34.90 PREGNANCY, UNSPECIFIED GESTATIONAL AGE: ICD-10-CM

## 2022-05-26 PROBLEM — Z20.820 EXPOSURE TO VARICELLA ZOSTER VIRUS (VZV): Status: ACTIVE | Noted: 2022-05-26

## 2022-05-26 PROBLEM — D25.1 INTRAMURAL LEIOMYOMA OF UTERUS: Status: ACTIVE | Noted: 2019-02-14

## 2022-05-26 PROBLEM — Z13.9 RISK AND FUNCTIONAL ASSESSMENT: Status: ACTIVE | Noted: 2022-05-26

## 2022-05-26 LAB — BACTERIA UR CULT: NO GROWTH

## 2022-05-26 PROCEDURE — 76801 OB US < 14 WKS SINGLE FETUS: CPT | Mod: TC,PO

## 2022-05-31 LAB
FINAL PATHOLOGIC DIAGNOSIS: NORMAL
Lab: NORMAL

## 2022-06-02 LAB
HPV HR 12 DNA SPEC QL NAA+PROBE: NEGATIVE
HPV16 AG SPEC QL: NEGATIVE
HPV18 DNA SPEC QL NAA+PROBE: NEGATIVE

## 2022-06-21 ENCOUNTER — LAB VISIT (OUTPATIENT)
Dept: LAB | Facility: OTHER | Age: 32
End: 2022-06-21
Attending: ADVANCED PRACTICE MIDWIFE
Payer: MEDICAID

## 2022-06-21 ENCOUNTER — INITIAL PRENATAL (OUTPATIENT)
Dept: OBSTETRICS AND GYNECOLOGY | Facility: CLINIC | Age: 32
End: 2022-06-21
Payer: MEDICAID

## 2022-06-21 VITALS
SYSTOLIC BLOOD PRESSURE: 112 MMHG | WEIGHT: 144.81 LBS | BODY MASS INDEX: 26.49 KG/M2 | DIASTOLIC BLOOD PRESSURE: 68 MMHG

## 2022-06-21 DIAGNOSIS — D64.9 ANEMIA, UNSPECIFIED TYPE: ICD-10-CM

## 2022-06-21 DIAGNOSIS — Z34.90 PREGNANCY, UNSPECIFIED GESTATIONAL AGE: Primary | ICD-10-CM

## 2022-06-21 DIAGNOSIS — O98.511 COVID-19 AFFECTING PREGNANCY IN FIRST TRIMESTER: ICD-10-CM

## 2022-06-21 DIAGNOSIS — Z34.90 PREGNANCY, UNSPECIFIED GESTATIONAL AGE: ICD-10-CM

## 2022-06-21 DIAGNOSIS — U07.1 COVID-19 AFFECTING PREGNANCY IN FIRST TRIMESTER: ICD-10-CM

## 2022-06-21 DIAGNOSIS — Z32.00 POSSIBLE PREGNANCY: ICD-10-CM

## 2022-06-21 PROCEDURE — 99212 OFFICE O/P EST SF 10 MIN: CPT | Mod: S$PBB,,, | Performed by: MIDWIFE

## 2022-06-21 PROCEDURE — 99212 PR OFFICE/OUTPT VISIT, EST, LEVL II, 10-19 MIN: ICD-10-PCS | Mod: S$PBB,,, | Performed by: MIDWIFE

## 2022-06-21 PROCEDURE — 99999 PR PBB SHADOW E&M-EST. PATIENT-LVL II: CPT | Mod: PBBFAC,,, | Performed by: MIDWIFE

## 2022-06-21 PROCEDURE — 36415 COLL VENOUS BLD VENIPUNCTURE: CPT | Performed by: ADVANCED PRACTICE MIDWIFE

## 2022-06-21 PROCEDURE — 87389 HIV-1 AG W/HIV-1&-2 AB AG IA: CPT | Performed by: ADVANCED PRACTICE MIDWIFE

## 2022-06-21 PROCEDURE — 80074 ACUTE HEPATITIS PANEL: CPT | Performed by: ADVANCED PRACTICE MIDWIFE

## 2022-06-21 PROCEDURE — 99999 PR PBB SHADOW E&M-EST. PATIENT-LVL II: ICD-10-PCS | Mod: PBBFAC,,, | Performed by: MIDWIFE

## 2022-06-21 PROCEDURE — 99212 OFFICE O/P EST SF 10 MIN: CPT | Mod: PBBFAC,TH | Performed by: MIDWIFE

## 2022-06-21 RX ORDER — FERROUS GLUCONATE 324(38)MG
324 TABLET ORAL
Qty: 30 TABLET | Refills: 3 | Status: SHIPPED | OUTPATIENT
Start: 2022-06-21 | End: 2022-10-17

## 2022-06-21 NOTE — PROGRESS NOTES
Chief Complaint   Patient presents with    Initial Prenatal Visit       32 y.o.,  at 12w6d by 9week US    Patient presents today for initial prenatal visit. Accompanied by Duy. Reports good support at home.    Complaints today: Report fatigue and mild nausea. Improving as pregnancy continues. Not taking unisom/b6 combination as often.  Doing well overall.   TW.5 lbs      Last pap: 2022NILM with negHRHVP. Denies hx of abnormals. Due 2025    Medical history: denies    Current medications: prenatal vitamin     Has not yet had labs collected yet-- reports she is waiting for insurance to kick in. 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)    ROS  OBSTETRICS:   Contractions No   Bleeding No   Loss of fluid No    GASTRO:   Nausea Yes- mild   Vomiting No      OB History    Para Term  AB Living   4 2 2   1 2   SAB IAB Ectopic Multiple Live Births   1     0 2      # Outcome Date GA Lbr Tavares/2nd Weight Sex Delivery Anes PTL Lv   4 Current            3 Term 10/17/20 41w1d  3.884 kg (8 lb 9 oz) F Vag-Spont EPI N MATHEUS   2 SAB 2019           1 Term 17 41w0d  3.969 kg (8 lb 12 oz) F Vag-Spont   MATHEUS       Allergies and problem list reviewed and updated  Medical and surgical history reviewed    PHYSICAL EXAM  /68   Wt 65.7 kg (144 lb 13.5 oz)   LMP 2022   BMI 26.49 kg/m²     GENERAL: No acute distress  HEENT: Normocephalic, atruamatic  NEURO: Alert and oriented x3  PSYCH: Calm, normal mood and affect  PULMONARY: Non-labored respiration; no tachypnea  ABD: Soft, gravid, nontender    ASSESSMENT AND PLAN    Problem List     No episode was linked to this visit.            Initial labs and dating u/s reviewed.  Urine culture negative  Counseled today on proper weight gain based on the [place] of [employer]'s recommendations based on her pre-pregnancy weight. Discussed excessive weight gain allowance, which would risk her out of the ABC (and would plan for  delivery on L&D), but not midwifery care. Reviewed potential risks to excessive weight gain.  .BMI (prepregnancy)  -- Discussed IOM recommended weight gain of:   Weight category Pre pre BMI  Recommended TWG   Underweight Less than 18.5 28-40    Normal Weight 18.5-24.9  25-35    Overweight 25-29.9  15-25    Obese   30 and greater  11-20   -- Discussed criteria for delivery at [facility] r/t excessive pre-preg weight or excessive weight gain:   Pre-pregnancy BMI over 40 or excess pregnancy weight gain defined as:   Pre-preg BMI < 18.5; Excess weight gain = > 60 pound   Pre-preg BMI 18.5-24.9;  Excess weight gain = > 53 pounds   Pre-preg BMI 25-29.9;  Excess weight gain = > 38 pounds   Pre-preg BMI > 30;  Excess weight gain = > 30 pounds    Review exercise precautions in pregnancy, along with recommendations. She does usually exercise regularly but has not since she has been so sick. Advised minimum of 150 min of moderate activity each week.  Discussed importance of nutrition in pregnancy.   Discussed substances & foods to avoid in pregnancy (i.e. sushi, fish that are high in mercury, deli meat, and unpasteurized cheeses).   Discussed prenatal vitamin options (i.e. stool softener, DHA).    Patient was oriented to practice and progression of routine prenatal care. Patient has previously completed a Meet & Greet tour of [facility].  Reviewed New OB Pregnancy packet & questions answered.    Reviewed aneuploidy screening options and indications, questions answered - patient undecided at this time. Will consider QUAD screening.  Education regarding warning signs.      Follow up: 4 wks, call or present sooner prn.     Joseph Sanon CNM

## 2022-06-22 ENCOUNTER — PATIENT MESSAGE (OUTPATIENT)
Dept: ADMINISTRATIVE | Facility: OTHER | Age: 32
End: 2022-06-22

## 2022-06-22 ENCOUNTER — PATIENT MESSAGE (OUTPATIENT)
Dept: MATERNAL FETAL MEDICINE | Facility: CLINIC | Age: 32
End: 2022-06-22

## 2022-06-22 LAB — HIV 1+2 AB+HIV1 P24 AG SERPL QL IA: NEGATIVE

## 2022-06-23 LAB
HAV IGM SERPL QL IA: NEGATIVE
HBV CORE IGM SERPL QL IA: NEGATIVE
HBV SURFACE AG SERPL QL IA: NEGATIVE
HCV AB SERPL QL IA: NEGATIVE

## 2022-07-27 ENCOUNTER — PATIENT MESSAGE (OUTPATIENT)
Dept: MATERNAL FETAL MEDICINE | Facility: CLINIC | Age: 32
End: 2022-07-27
Payer: MEDICAID

## 2022-07-28 ENCOUNTER — PROCEDURE VISIT (OUTPATIENT)
Dept: MATERNAL FETAL MEDICINE | Facility: CLINIC | Age: 32
End: 2022-07-28
Payer: MEDICAID

## 2022-07-28 ENCOUNTER — ROUTINE PRENATAL (OUTPATIENT)
Dept: OBSTETRICS AND GYNECOLOGY | Facility: CLINIC | Age: 32
End: 2022-07-28
Payer: MEDICAID

## 2022-07-28 VITALS
BODY MASS INDEX: 27.52 KG/M2 | DIASTOLIC BLOOD PRESSURE: 66 MMHG | WEIGHT: 150.44 LBS | SYSTOLIC BLOOD PRESSURE: 112 MMHG

## 2022-07-28 DIAGNOSIS — Z34.90 PREGNANCY, UNSPECIFIED GESTATIONAL AGE: ICD-10-CM

## 2022-07-28 DIAGNOSIS — Z3A.18 18 WEEKS GESTATION OF PREGNANCY: Primary | ICD-10-CM

## 2022-07-28 PROCEDURE — 76805 US MFM PROCEDURE (VIEWPOINT): ICD-10-PCS | Mod: 26,S$PBB,, | Performed by: OBSTETRICS & GYNECOLOGY

## 2022-07-28 PROCEDURE — 99999 PR PBB SHADOW E&M-EST. PATIENT-LVL II: ICD-10-PCS | Mod: PBBFAC,,, | Performed by: MIDWIFE

## 2022-07-28 PROCEDURE — 76805 OB US >/= 14 WKS SNGL FETUS: CPT | Mod: PBBFAC | Performed by: OBSTETRICS & GYNECOLOGY

## 2022-07-28 PROCEDURE — 99212 PR OFFICE/OUTPT VISIT, EST, LEVL II, 10-19 MIN: ICD-10-PCS | Mod: TH,S$PBB,, | Performed by: MIDWIFE

## 2022-07-28 PROCEDURE — 99999 PR PBB SHADOW E&M-EST. PATIENT-LVL II: CPT | Mod: PBBFAC,,, | Performed by: MIDWIFE

## 2022-07-28 PROCEDURE — 99212 OFFICE O/P EST SF 10 MIN: CPT | Mod: PBBFAC | Performed by: MIDWIFE

## 2022-07-28 PROCEDURE — 99212 OFFICE O/P EST SF 10 MIN: CPT | Mod: TH,S$PBB,, | Performed by: MIDWIFE

## 2022-07-28 NOTE — PROGRESS NOTES
Chief Complaint   Patient presents with    Routine Prenatal Visit       32 y.o., at 18w1d by Estimated Date of Delivery: 22    Complaints today: Some increased PS pain. Affirms feeling flutters. Doing well.    TW lbs    ROS  OBSTETRICS:   Contractions No   Bleeding No   Loss of fluid No   Fetal mvmnt present  GASTRO:   Nausea No   Vomiting No      OB History    Para Term  AB Living   4 2 2   1 2   SAB IAB Ectopic Multiple Live Births   1     0 2      # Outcome Date GA Lbr Tavares/2nd Weight Sex Delivery Anes PTL Lv   4 Current            3 Term 10/17/20 41w1d  3.884 kg (8 lb 9 oz) F Vag-Spont EPI N MATHEUS   2 SAB 2019           1 Term 17 41w0d  3.969 kg (8 lb 12 oz) F Vag-Spont   MATHEUS       Dating reviewed  Allergies and problem list reviewed and updated  Medical and surgical history reviewed  Prenatal labs reviewed and updated    PHYSICAL EXAM  /66   Wt 68.2 kg (150 lb 7.4 oz)   LMP 2022   BMI 27.52 kg/m²     GENERAL: No acute distress  HEENT: Normocephalic, atraumatic  NEURO: Alert and oriented x3  PSYCH: Normal mood and affect  PULMONARY: Non-labored respiration; no tachypnea  ABD: Soft, gravid, nontender; no hernia or hepatosplenomegaly  FH = umbilicus    ASSESSMENT AND PLAN     Problems (from 22 to present)     No problems associated with this episode.          Comfort care reviewed for PS pain  Anatomy US scheduled for today  Reviewed wt gain parameters for ABC, along with exercise/diet recommendations in pregnancy.  Encouraged prenatal education classes - schedule given.  Education regarding  labor warning s/s.  Confirmed after-hours number given to patient.    Reviewed warning signs, normal FM, and how/when to call.    Follow-up: 4 weeks, call or present sooner PRN    Pt VU and agrees with POC    Joseph Sanon CNM

## 2022-08-29 PROBLEM — Z13.9 RISK AND FUNCTIONAL ASSESSMENT: Status: RESOLVED | Noted: 2022-05-26 | Resolved: 2022-08-29

## 2022-09-07 ENCOUNTER — TELEPHONE (OUTPATIENT)
Dept: OBSTETRICS AND GYNECOLOGY | Facility: CLINIC | Age: 32
End: 2022-09-07
Payer: COMMERCIAL

## 2022-09-07 NOTE — TELEPHONE ENCOUNTER
Called and scheduled PNV Sept 19, pt agreed with time and date.KP        ----- Message from Sissy Peraza sent at 9/7/2022  1:03 PM CDT -----  Name of Who is Calling: MACRINA BARBOZA [[id number]]            What is the request in detail: Patient is requesting call back to cancel and reschedule appointment for shon              Can the clinic reply by [facility]: no              What Number to Call Back if not in [facility]: [phone]

## 2022-09-14 ENCOUNTER — PATIENT MESSAGE (OUTPATIENT)
Dept: ADMINISTRATIVE | Facility: OTHER | Age: 32
End: 2022-09-14
Payer: COMMERCIAL

## 2022-09-19 ENCOUNTER — LAB VISIT (OUTPATIENT)
Dept: LAB | Facility: OTHER | Age: 32
End: 2022-09-19
Attending: ADVANCED PRACTICE MIDWIFE
Payer: COMMERCIAL

## 2022-09-19 ENCOUNTER — ROUTINE PRENATAL (OUTPATIENT)
Dept: OBSTETRICS AND GYNECOLOGY | Facility: CLINIC | Age: 32
End: 2022-09-19
Payer: COMMERCIAL

## 2022-09-19 VITALS
BODY MASS INDEX: 29.13 KG/M2 | DIASTOLIC BLOOD PRESSURE: 70 MMHG | WEIGHT: 159.31 LBS | SYSTOLIC BLOOD PRESSURE: 122 MMHG

## 2022-09-19 DIAGNOSIS — E61.1 IRON DEFICIENCY: Primary | ICD-10-CM

## 2022-09-19 DIAGNOSIS — Z34.82 ENCOUNTER FOR SUPERVISION OF OTHER NORMAL PREGNANCY IN SECOND TRIMESTER: Primary | ICD-10-CM

## 2022-09-19 DIAGNOSIS — Z34.90 PREGNANCY, UNSPECIFIED GESTATIONAL AGE: ICD-10-CM

## 2022-09-19 LAB
BASOPHILS # BLD AUTO: 0.03 K/UL (ref 0–0.2)
BASOPHILS NFR BLD: 0.3 % (ref 0–1.9)
DIFFERENTIAL METHOD: ABNORMAL
EOSINOPHIL # BLD AUTO: 0.1 K/UL (ref 0–0.5)
EOSINOPHIL NFR BLD: 1.2 % (ref 0–8)
ERYTHROCYTE [DISTWIDTH] IN BLOOD BY AUTOMATED COUNT: 12.8 % (ref 11.5–14.5)
ESTIMATED AVG GLUCOSE: 88 MG/DL (ref 68–131)
FERRITIN SERPL-MCNC: 10 NG/ML (ref 20–300)
HBA1C MFR BLD: 4.7 % (ref 4–5.6)
HCT VFR BLD AUTO: 37 % (ref 37–48.5)
HGB BLD-MCNC: 11.9 G/DL (ref 12–16)
IMM GRANULOCYTES # BLD AUTO: 0.1 K/UL (ref 0–0.04)
IMM GRANULOCYTES NFR BLD AUTO: 0.9 % (ref 0–0.5)
IRON SERPL-MCNC: 56 UG/DL (ref 30–160)
LYMPHOCYTES # BLD AUTO: 1.4 K/UL (ref 1–4.8)
LYMPHOCYTES NFR BLD: 12 % (ref 18–48)
MCH RBC QN AUTO: 28.7 PG (ref 27–31)
MCHC RBC AUTO-ENTMCNC: 32.2 G/DL (ref 32–36)
MCV RBC AUTO: 89 FL (ref 82–98)
MONOCYTES # BLD AUTO: 0.5 K/UL (ref 0.3–1)
MONOCYTES NFR BLD: 4 % (ref 4–15)
NEUTROPHILS # BLD AUTO: 9.3 K/UL (ref 1.8–7.7)
NEUTROPHILS NFR BLD: 81.6 % (ref 38–73)
NRBC BLD-RTO: 0 /100 WBC
PLATELET # BLD AUTO: 218 K/UL (ref 150–450)
PMV BLD AUTO: 10.7 FL (ref 9.2–12.9)
RBC # BLD AUTO: 4.14 M/UL (ref 4–5.4)
SATURATED IRON: 10 % (ref 20–50)
TOTAL IRON BINDING CAPACITY: 558 UG/DL (ref 250–450)
TRANSFERRIN SERPL-MCNC: 377 MG/DL (ref 200–375)
WBC # BLD AUTO: 11.4 K/UL (ref 3.9–12.7)

## 2022-09-19 PROCEDURE — 99212 OFFICE O/P EST SF 10 MIN: CPT | Mod: PBBFAC,TH | Performed by: ADVANCED PRACTICE MIDWIFE

## 2022-09-19 PROCEDURE — 86592 SYPHILIS TEST NON-TREP QUAL: CPT | Performed by: ADVANCED PRACTICE MIDWIFE

## 2022-09-19 PROCEDURE — 99999 PR PBB SHADOW E&M-EST. PATIENT-LVL II: CPT | Mod: PBBFAC,,, | Performed by: ADVANCED PRACTICE MIDWIFE

## 2022-09-19 PROCEDURE — 36415 COLL VENOUS BLD VENIPUNCTURE: CPT | Performed by: ADVANCED PRACTICE MIDWIFE

## 2022-09-19 PROCEDURE — 86762 RUBELLA ANTIBODY: CPT | Performed by: ADVANCED PRACTICE MIDWIFE

## 2022-09-19 PROCEDURE — 85025 COMPLETE CBC W/AUTO DIFF WBC: CPT | Performed by: ADVANCED PRACTICE MIDWIFE

## 2022-09-19 PROCEDURE — 99999 PR PBB SHADOW E&M-EST. PATIENT-LVL II: ICD-10-PCS | Mod: PBBFAC,,, | Performed by: ADVANCED PRACTICE MIDWIFE

## 2022-09-19 PROCEDURE — 82728 ASSAY OF FERRITIN: CPT | Performed by: ADVANCED PRACTICE MIDWIFE

## 2022-09-19 PROCEDURE — 83036 HEMOGLOBIN GLYCOSYLATED A1C: CPT | Performed by: ADVANCED PRACTICE MIDWIFE

## 2022-09-19 PROCEDURE — 0502F PR SUBSEQUENT PRENATAL CARE: ICD-10-PCS | Mod: S$GLB,,, | Performed by: ADVANCED PRACTICE MIDWIFE

## 2022-09-19 PROCEDURE — 84466 ASSAY OF TRANSFERRIN: CPT | Performed by: ADVANCED PRACTICE MIDWIFE

## 2022-09-19 PROCEDURE — 0502F SUBSEQUENT PRENATAL CARE: CPT | Mod: S$GLB,,, | Performed by: ADVANCED PRACTICE MIDWIFE

## 2022-09-19 NOTE — PROGRESS NOTES
Chief Complaint   Patient presents with    Routine Prenatal Visit       32 y.o. female  at 25w5d by Estimated Date of Delivery: 22    Complaints today: none  Here with    Reviewed TWG: 15lbs    ROS  OBSTETRICS:   Contractions No   Bleeding No   Loss of fluid No   Fetal mvmnt Yes   GASTRO:   Nausea No   Vomiting No      OB History    Para Term  AB Living   4 2 2   1 2   SAB IAB Ectopic Multiple Live Births   1     0 2      # Outcome Date GA Lbr Tavares/2nd Weight Sex Delivery Anes PTL Lv   4 Current            3 Term 10/17/20 41w1d  3.884 kg (8 lb 9 oz) F Vag-Spont EPI N MATHEUS   2 SAB 2019           1 Term 17 41w0d  3.969 kg (8 lb 12 oz) F Vag-Spont   MATHEUS       Dating reviewed  Allergies and problem list reviewed and updated  Medical and surgical history reviewed  Prenatal labs reviewed and updated    PHYSICAL EXAM  /70   Wt 72.3 kg (159 lb 4.5 oz)   LMP 2022   BMI 29.13 kg/m²     GENERAL: No acute distress  HEENT: Normocephalic, atraumatic  NEURO: Alert and oriented x3  PSYCH: Normal mood and affect  PULMONARY: Non-labored respiration; no tachypnea  ABD: Soft, gravid, nontender.    ASSESSMENT AND PLAN     Problems (from 22 to present)     No problems associated with this episode.          Completing 28wk labs today.   Blood type: (A POS)..  Education regarding CDC recommendations for Tdap in pregnancy. Patient wants . Order placed, will schedule with next appt.    Reviewed warning signs, normal FKCs,  labor precautions and how/when to call.    Follow-up: 2 weeks, call or present sooner PRN.

## 2022-09-20 LAB
RPR SER QL: NORMAL
RUBV IGG SER-ACNC: 112 IU/ML
RUBV IGG SER-IMP: REACTIVE

## 2022-10-17 ENCOUNTER — TELEPHONE (OUTPATIENT)
Dept: OBSTETRICS AND GYNECOLOGY | Facility: CLINIC | Age: 32
End: 2022-10-17
Payer: COMMERCIAL

## 2022-10-17 ENCOUNTER — LAB VISIT (OUTPATIENT)
Dept: LAB | Facility: OTHER | Age: 32
End: 2022-10-17
Payer: COMMERCIAL

## 2022-10-17 ENCOUNTER — PATIENT MESSAGE (OUTPATIENT)
Dept: OBSTETRICS AND GYNECOLOGY | Facility: CLINIC | Age: 32
End: 2022-10-17

## 2022-10-17 ENCOUNTER — ROUTINE PRENATAL (OUTPATIENT)
Dept: OBSTETRICS AND GYNECOLOGY | Facility: CLINIC | Age: 32
End: 2022-10-17
Payer: COMMERCIAL

## 2022-10-17 ENCOUNTER — CLINICAL SUPPORT (OUTPATIENT)
Dept: OBSTETRICS AND GYNECOLOGY | Facility: CLINIC | Age: 32
End: 2022-10-17
Payer: MEDICAID

## 2022-10-17 VITALS — DIASTOLIC BLOOD PRESSURE: 68 MMHG | BODY MASS INDEX: 30.6 KG/M2 | WEIGHT: 167.31 LBS | SYSTOLIC BLOOD PRESSURE: 112 MMHG

## 2022-10-17 DIAGNOSIS — R10.2 PELVIC PAIN AFFECTING PREGNANCY IN THIRD TRIMESTER, ANTEPARTUM: ICD-10-CM

## 2022-10-17 DIAGNOSIS — Z3A.29 29 WEEKS GESTATION OF PREGNANCY: Primary | ICD-10-CM

## 2022-10-17 DIAGNOSIS — Z3A.29 29 WEEKS GESTATION OF PREGNANCY: ICD-10-CM

## 2022-10-17 DIAGNOSIS — O26.893 PELVIC PAIN AFFECTING PREGNANCY IN THIRD TRIMESTER, ANTEPARTUM: ICD-10-CM

## 2022-10-17 DIAGNOSIS — Z23 NEEDS FLU SHOT: Primary | ICD-10-CM

## 2022-10-17 LAB
BASOPHILS # BLD AUTO: 0.03 K/UL (ref 0–0.2)
BASOPHILS NFR BLD: 0.3 % (ref 0–1.9)
DIFFERENTIAL METHOD: ABNORMAL
EOSINOPHIL # BLD AUTO: 0.2 K/UL (ref 0–0.5)
EOSINOPHIL NFR BLD: 1.6 % (ref 0–8)
ERYTHROCYTE [DISTWIDTH] IN BLOOD BY AUTOMATED COUNT: 13.5 % (ref 11.5–14.5)
GLUCOSE SERPL-MCNC: 87 MG/DL (ref 70–140)
HCT VFR BLD AUTO: 34 % (ref 37–48.5)
HGB BLD-MCNC: 10.9 G/DL (ref 12–16)
HIV 1+2 AB+HIV1 P24 AG SERPL QL IA: NORMAL
IMM GRANULOCYTES # BLD AUTO: 0.15 K/UL (ref 0–0.04)
IMM GRANULOCYTES NFR BLD AUTO: 1.4 % (ref 0–0.5)
LYMPHOCYTES # BLD AUTO: 1.3 K/UL (ref 1–4.8)
LYMPHOCYTES NFR BLD: 12.5 % (ref 18–48)
MCH RBC QN AUTO: 28.4 PG (ref 27–31)
MCHC RBC AUTO-ENTMCNC: 32.1 G/DL (ref 32–36)
MCV RBC AUTO: 89 FL (ref 82–98)
MONOCYTES # BLD AUTO: 0.7 K/UL (ref 0.3–1)
MONOCYTES NFR BLD: 7.1 % (ref 4–15)
NEUTROPHILS # BLD AUTO: 8.1 K/UL (ref 1.8–7.7)
NEUTROPHILS NFR BLD: 77.1 % (ref 38–73)
NRBC BLD-RTO: 0 /100 WBC
PLATELET # BLD AUTO: 201 K/UL (ref 150–450)
PMV BLD AUTO: 11.1 FL (ref 9.2–12.9)
RBC # BLD AUTO: 3.84 M/UL (ref 4–5.4)
WBC # BLD AUTO: 10.47 K/UL (ref 3.9–12.7)

## 2022-10-17 PROCEDURE — 86592 SYPHILIS TEST NON-TREP QUAL: CPT

## 2022-10-17 PROCEDURE — 85025 COMPLETE CBC W/AUTO DIFF WBC: CPT

## 2022-10-17 PROCEDURE — 36415 COLL VENOUS BLD VENIPUNCTURE: CPT

## 2022-10-17 PROCEDURE — 90686 FLU VACCINE (QUAD) GREATER THAN OR EQUAL TO 3YO PRESERVATIVE FREE IM: ICD-10-PCS | Mod: S$GLB,,, | Performed by: MIDWIFE

## 2022-10-17 PROCEDURE — 99999 PR PBB SHADOW E&M-EST. PATIENT-LVL II: CPT | Mod: PBBFAC,,, | Performed by: MIDWIFE

## 2022-10-17 PROCEDURE — 0502F SUBSEQUENT PRENATAL CARE: CPT | Mod: S$GLB,,, | Performed by: MIDWIFE

## 2022-10-17 PROCEDURE — 90471 IMMUNIZATION ADMIN: CPT | Mod: S$GLB,,, | Performed by: MIDWIFE

## 2022-10-17 PROCEDURE — 0502F PR SUBSEQUENT PRENATAL CARE: ICD-10-PCS | Mod: S$GLB,,, | Performed by: MIDWIFE

## 2022-10-17 PROCEDURE — 99212 OFFICE O/P EST SF 10 MIN: CPT | Mod: PBBFAC,TH | Performed by: MIDWIFE

## 2022-10-17 PROCEDURE — 99999 PR PBB SHADOW E&M-EST. PATIENT-LVL II: ICD-10-PCS | Mod: PBBFAC,,, | Performed by: MIDWIFE

## 2022-10-17 PROCEDURE — 90686 IIV4 VACC NO PRSV 0.5 ML IM: CPT | Mod: S$GLB,,, | Performed by: MIDWIFE

## 2022-10-17 PROCEDURE — 82950 GLUCOSE TEST: CPT

## 2022-10-17 PROCEDURE — 87389 HIV-1 AG W/HIV-1&-2 AB AG IA: CPT

## 2022-10-17 PROCEDURE — 90471 FLU VACCINE (QUAD) GREATER THAN OR EQUAL TO 3YO PRESERVATIVE FREE IM: ICD-10-PCS | Mod: S$GLB,,, | Performed by: MIDWIFE

## 2022-10-17 NOTE — PROGRESS NOTES
Chief Complaint   Patient presents with    Routine Prenatal Visit       32 y.o. female  at 29w5d by Estimated Date of Delivery: 22    Complaints today: Pelvic pain and vulvar varicosities. Reports small gino-like bump in labia minora. Otherwise doing well.     Reviewed TWlbs    ROS  OBSTETRICS:   Contractions No   Bleeding No   Loss of fluid No   Fetal mvmnt present  GASTRO:   Nausea No   Vomiting No      OB History    Para Term  AB Living   4 2 2   1 2   SAB IAB Ectopic Multiple Live Births   1     0 2      # Outcome Date GA Lbr Tavares/2nd Weight Sex Delivery Anes PTL Lv   4 Current            3 Term 10/17/20 41w1d  3.884 kg (8 lb 9 oz) F Vag-Spont EPI N MATHEUS   2 SAB 2019           1 Term 17 41w0d  3.969 kg (8 lb 12 oz) F Vag-Spont   MATHEUS       Dating reviewed  Allergies and problem list reviewed and updated  Medical and surgical history reviewed  Prenatal labs reviewed and updated    PHYSICAL EXAM  /68   Wt 75.9 kg (167 lb 5.3 oz)   LMP 2022   BMI 30.60 kg/m²     GENERAL: No acute distress  HEENT: Normocephalic, atraumatic  NEURO: Alert and oriented x3  PSYCH: Normal mood and affect  PULMONARY: Non-labored respiration; no tachypnea  ABD: Soft, gravid, nontender.  Vulva: mild vulvar edema, 2-3mm cyst at base of left labia minora. NTTP. Bartholin gland nonpalpable    ASSESSMENT AND PLAN     Problems (from 22 to present)       No problems associated with this episode.          Encouraged warm compresses to vulva and support wear  Referral to pelvic floor physical therapy  Order placed for influenza vaccination   Completing 28wk labs today.   Blood type: (A POS). Rhogam not indicated  Education regarding CDC recommendations for Tdap in pregnancy. Patient desires. Order placed, will schedule with next appt.    Reviewed warning signs, normal FKCs,  labor precautions and how/when to call.    Follow-up: 2 weeks, call or present sooner PRN.    Pt VU  and agrees with PRASAD Sanon. [facility]

## 2022-10-17 NOTE — TELEPHONE ENCOUNTER
Called and left [facility] phone number [phone] regarding doing her glucose before appointment today.ARIES

## 2022-10-18 LAB — RPR SER QL: NORMAL

## 2022-10-19 DIAGNOSIS — O99.019 ANEMIA DURING PREGNANCY: Primary | ICD-10-CM

## 2022-10-19 RX ORDER — FERROUS SULFATE 325(65) MG
325 TABLET, DELAYED RELEASE (ENTERIC COATED) ORAL DAILY
Qty: 90 TABLET | Refills: 0 | Status: ON HOLD | OUTPATIENT
Start: 2022-10-19 | End: 2022-12-30 | Stop reason: CLARIF

## 2022-11-01 ENCOUNTER — ROUTINE PRENATAL (OUTPATIENT)
Dept: OBSTETRICS AND GYNECOLOGY | Facility: CLINIC | Age: 32
End: 2022-11-01
Payer: COMMERCIAL

## 2022-11-01 VITALS
WEIGHT: 168.75 LBS | DIASTOLIC BLOOD PRESSURE: 68 MMHG | SYSTOLIC BLOOD PRESSURE: 112 MMHG | BODY MASS INDEX: 30.87 KG/M2

## 2022-11-01 DIAGNOSIS — Z3A.31 31 WEEKS GESTATION OF PREGNANCY: ICD-10-CM

## 2022-11-01 PROCEDURE — 99999 PR PBB SHADOW E&M-EST. PATIENT-LVL II: CPT | Mod: PBBFAC,,,

## 2022-11-01 PROCEDURE — 0502F SUBSEQUENT PRENATAL CARE: CPT | Mod: CPTII,S$GLB,,

## 2022-11-01 PROCEDURE — 99999 PR PBB SHADOW E&M-EST. PATIENT-LVL II: ICD-10-PCS | Mod: PBBFAC,,,

## 2022-11-01 PROCEDURE — 0502F PR SUBSEQUENT PRENATAL CARE: ICD-10-PCS | Mod: CPTII,S$GLB,,

## 2022-11-01 NOTE — PROGRESS NOTES
Chief Complaint   Patient presents with    Routine Prenatal Visit       32 y.o. female  at 31w6d, by Estimated Date of Delivery: 22    Complaints today: sorness. Doing well today.  Reviewed TW lbs        ROS  OBSTETRICS:   Contractions No   Bleeding No   Loss of fluid No   Fetal mvmnt Yes  GASTRO:   Nausea No   Vomiting No      OB History    Para Term  AB Living   4 2 2   1 2   SAB IAB Ectopic Multiple Live Births   1     0 2      # Outcome Date GA Lbr Tavares/2nd Weight Sex Delivery Anes PTL Lv   4 Current            3 Term 10/17/20 41w1d  3.884 kg (8 lb 9 oz) F Vag-Spont EPI N MATHEUS   2 SAB 2019           1 Term 17 41w0d  3.969 kg (8 lb 12 oz) F Vag-Spont   MATHEUS       Dating reviewed  Allergies and problem list reviewed and updated  Medical and surgical history reviewed  Prenatal labs reviewed and updated    PHYSICAL EXAM  /68   Wt 76.5 kg (168 lb 12.2 oz)   LMP 2022   BMI 30.87 kg/m²     GENERAL: No acute distress  HEENT: Normocephalic, atraumatic  NEURO: Alert and oriented x3  PSYCH: Normal mood and affect  PULMONARY: Non-labored respiration; no tachypnea  ABD: Soft, gravid, nontender.      ASSESSMENT AND PLAN     Problems (from 22 to present)     No problems associated with this episode.          Patient does plans to breast feed -  first two children  It's a boy! They do desire circumcision.  Pediatrician: Dr. Mckay in [place]   Patient does request Breast Pump Rx today.    Reviewed warning signs, normal FM,  labor precautions, and how/when to call.  Confirmed pt has after-hours number.    Follow-up: 2 weeks, call or present sooner PRN

## 2022-11-17 ENCOUNTER — CLINICAL SUPPORT (OUTPATIENT)
Dept: OBSTETRICS AND GYNECOLOGY | Facility: CLINIC | Age: 32
End: 2022-11-17
Payer: COMMERCIAL

## 2022-11-17 ENCOUNTER — ROUTINE PRENATAL (OUTPATIENT)
Dept: OBSTETRICS AND GYNECOLOGY | Facility: CLINIC | Age: 32
End: 2022-11-17
Payer: COMMERCIAL

## 2022-11-17 VITALS
WEIGHT: 172.19 LBS | BODY MASS INDEX: 31.49 KG/M2 | DIASTOLIC BLOOD PRESSURE: 62 MMHG | SYSTOLIC BLOOD PRESSURE: 110 MMHG

## 2022-11-17 DIAGNOSIS — Z3A.34 34 WEEKS GESTATION OF PREGNANCY: Primary | ICD-10-CM

## 2022-11-17 DIAGNOSIS — O99.019 ANEMIA DURING PREGNANCY: ICD-10-CM

## 2022-11-17 DIAGNOSIS — Z23 NEED FOR DIPHTHERIA-TETANUS-PERTUSSIS (TDAP) VACCINE: Primary | ICD-10-CM

## 2022-11-17 PROCEDURE — 99999 PR PBB SHADOW E&M-EST. PATIENT-LVL I: CPT | Mod: PBBFAC,,,

## 2022-11-17 PROCEDURE — 99999 PR PBB SHADOW E&M-EST. PATIENT-LVL II: CPT | Mod: PBBFAC,,,

## 2022-11-17 PROCEDURE — 90471 IMMUNIZATION ADMIN: CPT | Mod: S$GLB,,, | Performed by: ADVANCED PRACTICE MIDWIFE

## 2022-11-17 PROCEDURE — 99999 PR PBB SHADOW E&M-EST. PATIENT-LVL II: ICD-10-PCS | Mod: PBBFAC,,,

## 2022-11-17 PROCEDURE — 90715 TDAP VACCINE GREATER THAN OR EQUAL TO 7YO IM: ICD-10-PCS | Mod: S$GLB,,, | Performed by: ADVANCED PRACTICE MIDWIFE

## 2022-11-17 PROCEDURE — 90715 TDAP VACCINE 7 YRS/> IM: CPT | Mod: S$GLB,,, | Performed by: ADVANCED PRACTICE MIDWIFE

## 2022-11-17 PROCEDURE — 90471 TDAP VACCINE GREATER THAN OR EQUAL TO 7YO IM: ICD-10-PCS | Mod: S$GLB,,, | Performed by: ADVANCED PRACTICE MIDWIFE

## 2022-11-17 PROCEDURE — 0502F PR SUBSEQUENT PRENATAL CARE: ICD-10-PCS | Mod: CPTII,S$GLB,,

## 2022-11-17 PROCEDURE — 99999 PR PBB SHADOW E&M-EST. PATIENT-LVL I: ICD-10-PCS | Mod: PBBFAC,,,

## 2022-11-17 PROCEDURE — 0502F SUBSEQUENT PRENATAL CARE: CPT | Mod: CPTII,S$GLB,,

## 2022-11-17 NOTE — PROGRESS NOTES
Here for Tdap injection. Patient without complaint of pain at this time, injection given. Tolerated well no pain noted post injection advised to wait in lobby 15 minutes and report any adverse reactions.      Site:RD

## 2022-11-17 NOTE — PROGRESS NOTES
Chief Complaint   Patient presents with    Routine Prenatal Visit       32 y.o. female  at 34w1d, by Estimated Date of Delivery: 22    Complaints today: None. Doing well today.  Reviewed TW lbs      ROS  OBSTETRICS:   Contractions No   Bleeding No   Loss of fluid No   Fetal mvmnt Yes  GASTRO:   Nausea No   Vomiting No      OB History    Para Term  AB Living   4 2 2   1 2   SAB IAB Ectopic Multiple Live Births   1     0 2      # Outcome Date GA Lbr Tavares/2nd Weight Sex Delivery Anes PTL Lv   4 Current            3 Term 10/17/20 41w1d  3.884 kg (8 lb 9 oz) F Vag-Spont EPI N MATHEUS   2 SAB 2019           1 Term 17 41w0d  3.969 kg (8 lb 12 oz) F Vag-Spont   MATHEUS       Dating reviewed  Allergies and problem list reviewed and updated  Medical and surgical history reviewed  Prenatal labs reviewed and updated    PHYSICAL EXAM  /62   Wt 78.1 kg (172 lb 2.9 oz)   LMP 2022   BMI 31.49 kg/m²     GENERAL: No acute distress  HEENT: Normocephalic, atraumatic  NEURO: Alert and oriented x3  PSYCH: Normal mood and affect  PULMONARY: Non-labored respiration; no tachypnea  ABD: Soft, gravid, nontender.      ASSESSMENT AND PLAN     Problems (from 22 to present)     No problems associated with this episode.        Patient to receive TDAP today  Reviewed upcoming 36wk labs.   Reviewed patient does not have a history of genital HSV.     Reviewed ABC risk assessment with the patient:    Birth Center Risk Assessment: 0- Meets birth center guidelines    0- CNM management in ABC  1- CNM management on L&D  2- Consultation with OB to develop  plan of care  3- Collaborative CNM/OB management with delivery on L&D  4- Permanent referral of care to MD    She understands she is a candidate for the ABC. Reviewed potential risks which could arise, that could change this status.    Reviewed warning signs, normal FM,  labor precautions, and how/when to call. Confirmed pt has  after-hours number.    Follow-up: 2 weeks, call or present sooner PASTORA Sifuentes CNM

## 2022-12-05 ENCOUNTER — ROUTINE PRENATAL (OUTPATIENT)
Dept: OBSTETRICS AND GYNECOLOGY | Facility: CLINIC | Age: 32
End: 2022-12-05
Payer: COMMERCIAL

## 2022-12-05 ENCOUNTER — LAB VISIT (OUTPATIENT)
Dept: LAB | Facility: OTHER | Age: 32
End: 2022-12-05
Payer: COMMERCIAL

## 2022-12-05 VITALS
SYSTOLIC BLOOD PRESSURE: 108 MMHG | BODY MASS INDEX: 32.58 KG/M2 | WEIGHT: 178.13 LBS | DIASTOLIC BLOOD PRESSURE: 68 MMHG

## 2022-12-05 DIAGNOSIS — Z3A.36 36 WEEKS GESTATION OF PREGNANCY: ICD-10-CM

## 2022-12-05 DIAGNOSIS — Z3A.36 36 WEEKS GESTATION OF PREGNANCY: Primary | ICD-10-CM

## 2022-12-05 LAB
BASOPHILS # BLD AUTO: 0.04 K/UL (ref 0–0.2)
BASOPHILS NFR BLD: 0.4 % (ref 0–1.9)
DIFFERENTIAL METHOD: ABNORMAL
EOSINOPHIL # BLD AUTO: 0.2 K/UL (ref 0–0.5)
EOSINOPHIL NFR BLD: 1.4 % (ref 0–8)
ERYTHROCYTE [DISTWIDTH] IN BLOOD BY AUTOMATED COUNT: 15 % (ref 11.5–14.5)
HCT VFR BLD AUTO: 35.2 % (ref 37–48.5)
HGB BLD-MCNC: 11.2 G/DL (ref 12–16)
IMM GRANULOCYTES # BLD AUTO: 0.11 K/UL (ref 0–0.04)
IMM GRANULOCYTES NFR BLD AUTO: 1 % (ref 0–0.5)
LYMPHOCYTES # BLD AUTO: 1.6 K/UL (ref 1–4.8)
LYMPHOCYTES NFR BLD: 13.8 % (ref 18–48)
MCH RBC QN AUTO: 26.5 PG (ref 27–31)
MCHC RBC AUTO-ENTMCNC: 31.8 G/DL (ref 32–36)
MCV RBC AUTO: 83 FL (ref 82–98)
MONOCYTES # BLD AUTO: 0.8 K/UL (ref 0.3–1)
MONOCYTES NFR BLD: 6.9 % (ref 4–15)
NEUTROPHILS # BLD AUTO: 8.7 K/UL (ref 1.8–7.7)
NEUTROPHILS NFR BLD: 76.5 % (ref 38–73)
NRBC BLD-RTO: 0 /100 WBC
PLATELET # BLD AUTO: 211 K/UL (ref 150–450)
PMV BLD AUTO: 11.5 FL (ref 9.2–12.9)
RBC # BLD AUTO: 4.23 M/UL (ref 4–5.4)
WBC # BLD AUTO: 11.33 K/UL (ref 3.9–12.7)

## 2022-12-05 PROCEDURE — 99999 PR PBB SHADOW E&M-EST. PATIENT-LVL III: ICD-10-PCS | Mod: PBBFAC,,,

## 2022-12-05 PROCEDURE — 0502F SUBSEQUENT PRENATAL CARE: CPT | Mod: CPTII,S$GLB,,

## 2022-12-05 PROCEDURE — 0502F PR SUBSEQUENT PRENATAL CARE: ICD-10-PCS | Mod: CPTII,S$GLB,,

## 2022-12-05 PROCEDURE — 36415 COLL VENOUS BLD VENIPUNCTURE: CPT

## 2022-12-05 PROCEDURE — 99999 PR PBB SHADOW E&M-EST. PATIENT-LVL III: CPT | Mod: PBBFAC,,,

## 2022-12-05 PROCEDURE — 87081 CULTURE SCREEN ONLY: CPT

## 2022-12-05 PROCEDURE — 85025 COMPLETE CBC W/AUTO DIFF WBC: CPT

## 2022-12-05 NOTE — PROGRESS NOTES
Chief Complaint   Patient presents with    Routine Prenatal Visit       32 y.o. female  at 36w5d, by Estimated Date of Delivery: 22    Complaints today: Some trace fink. Doing well today.  Reviewed TW lbs  Reports no history of HSV    ROS  OBSTETRICS:   Contractions No   Bleeding No   Loss of fluid No   Fetal mvmnt Yes  GASTRO:   Nausea No   Vomiting No      OB History    Para Term  AB Living   4 2 2   1 2   SAB IAB Ectopic Multiple Live Births   1     0 2      # Outcome Date GA Lbr Tavares/2nd Weight Sex Delivery Anes PTL Lv   4 Current            3 Term 10/17/20 41w1d  3.884 kg (8 lb 9 oz) F Vag-Spont EPI N MATHEUS   2 SAB 2019           1 Term 17 41w0d  3.969 kg (8 lb 12 oz) F Vag-Spont   MATHEUS       Dating reviewed  Allergies and problem list reviewed and updated  Medical and surgical history reviewed  Prenatal labs reviewed and updated    PHYSICAL EXAM  /68   Wt 80.8 kg (178 lb 2.1 oz)   LMP 2022   BMI 32.58 kg/m²     GENERAL: No acute distress  HEENT: Normocephalic, atraumatic  NEURO: Alert and oriented x3  PSYCH: Normal mood and affect  PULMONARY: Non-labored respiration; no tachypnea  ABD: Soft, gravid, nontender.      ASSESSMENT AND PLAN     Problems (from 22 to present)     No problems associated with this episode.          GBS collected today   Reviewed [place] department of Health recommendation for repeat HIV/RPR today; she already had drawn - neg      Reviewed warning signs, normal FM,  labor precautions, and how/when to call.      Follow-up: 1 week, call or present sooner PASTORA Sifuentes CNM

## 2022-12-07 LAB — BACTERIA SPEC AEROBE CULT: NORMAL

## 2022-12-12 ENCOUNTER — ROUTINE PRENATAL (OUTPATIENT)
Dept: OBSTETRICS AND GYNECOLOGY | Facility: CLINIC | Age: 32
End: 2022-12-12
Payer: COMMERCIAL

## 2022-12-12 VITALS
WEIGHT: 180.56 LBS | SYSTOLIC BLOOD PRESSURE: 110 MMHG | BODY MASS INDEX: 33.02 KG/M2 | DIASTOLIC BLOOD PRESSURE: 64 MMHG

## 2022-12-12 DIAGNOSIS — Z34.90 PREGNANCY, UNSPECIFIED GESTATIONAL AGE: Primary | ICD-10-CM

## 2022-12-12 LAB
C TRACH DNA SPEC QL NAA+PROBE: NOT DETECTED
N GONORRHOEA DNA SPEC QL NAA+PROBE: NOT DETECTED

## 2022-12-12 PROCEDURE — 99999 PR PBB SHADOW E&M-EST. PATIENT-LVL II: CPT | Mod: PBBFAC,,, | Performed by: ADVANCED PRACTICE MIDWIFE

## 2022-12-12 PROCEDURE — 99999 PR PBB SHADOW E&M-EST. PATIENT-LVL II: ICD-10-PCS | Mod: PBBFAC,,, | Performed by: ADVANCED PRACTICE MIDWIFE

## 2022-12-12 PROCEDURE — 0502F PR SUBSEQUENT PRENATAL CARE: ICD-10-PCS | Mod: CPTII,S$GLB,, | Performed by: ADVANCED PRACTICE MIDWIFE

## 2022-12-12 PROCEDURE — 87491 CHLMYD TRACH DNA AMP PROBE: CPT | Performed by: ADVANCED PRACTICE MIDWIFE

## 2022-12-12 PROCEDURE — 87591 N.GONORRHOEAE DNA AMP PROB: CPT | Performed by: ADVANCED PRACTICE MIDWIFE

## 2022-12-12 PROCEDURE — 0502F SUBSEQUENT PRENATAL CARE: CPT | Mod: CPTII,S$GLB,, | Performed by: ADVANCED PRACTICE MIDWIFE

## 2022-12-12 NOTE — PROGRESS NOTES
Chief Complaint   Patient presents with    Routine Prenatal Visit     32 y.o. female  at 37w4d, by Estimated Date of Delivery: 22    Doing well today.    Reviewed TW lbs    BMI  -- Discussed IOM recommended weight gain of:   Overweight 25-29.9  15-25       ROS  OBSTETRICS:   Contractions: Nothing regular   Bleeding No   Loss of fluid No   Fetal mvmnt Yes  GASTRO:   Nausea No   Vomiting No      OB History    Para Term  AB Living   4 2 2   1 2   SAB IAB Ectopic Multiple Live Births   1     0 2      # Outcome Date GA Lbr Tavares/2nd Weight Sex Delivery Anes PTL Lv   4 Current            3 Term 10/17/20 41w1d  3.884 kg (8 lb 9 oz) F Vag-Spont EPI N MATHEUS   2 SAB 2019           1 Term 17 41w0d  3.969 kg (8 lb 12 oz) F Vag-Spont   MATHEUS       Dating reviewed  Allergies and problem list reviewed and updated  Medical and surgical history reviewed  Prenatal labs reviewed and updated    PHYSICAL EXAM  /64   Wt 81.9 kg (180 lb 8.9 oz)   LMP 2022   BMI 33.02 kg/m²     GENERAL: No acute distress  HEENT: Normocephalic, atraumatic  NEURO: Alert and oriented x3  PSYCH: Normal mood and affect  PULMONARY: Non-labored respiration; no tachypnea  ABD: Soft, gravid, nontender.      ASSESSMENT AND PLAN     Problems (from 22 to present)       No problems associated with this episode.          Agrees to have routine OB GC collected via urine today.    Delivery consents reviewed and signed    Reviewed GBS negative    Reviewed repeat 3rd trimester HIV/RPR both negative    Education regarding labor precautions.    Reviewed warning signs, normal FM, and how/when to call.      Follow-up: 1 week, call or present sooner PRN

## 2022-12-20 ENCOUNTER — ROUTINE PRENATAL (OUTPATIENT)
Dept: OBSTETRICS AND GYNECOLOGY | Facility: CLINIC | Age: 32
End: 2022-12-20
Payer: COMMERCIAL

## 2022-12-20 VITALS
DIASTOLIC BLOOD PRESSURE: 66 MMHG | SYSTOLIC BLOOD PRESSURE: 110 MMHG | WEIGHT: 181.19 LBS | BODY MASS INDEX: 33.15 KG/M2

## 2022-12-20 DIAGNOSIS — Z34.93 PRENATAL CARE IN THIRD TRIMESTER: Primary | ICD-10-CM

## 2022-12-20 PROCEDURE — 99999 PR PBB SHADOW E&M-EST. PATIENT-LVL II: ICD-10-PCS | Mod: PBBFAC,,, | Performed by: ADVANCED PRACTICE MIDWIFE

## 2022-12-20 PROCEDURE — 99999 PR PBB SHADOW E&M-EST. PATIENT-LVL II: CPT | Mod: PBBFAC,,, | Performed by: ADVANCED PRACTICE MIDWIFE

## 2022-12-20 PROCEDURE — 0502F SUBSEQUENT PRENATAL CARE: CPT | Mod: CPTII,S$GLB,, | Performed by: ADVANCED PRACTICE MIDWIFE

## 2022-12-20 PROCEDURE — 0502F PR SUBSEQUENT PRENATAL CARE: ICD-10-PCS | Mod: CPTII,S$GLB,, | Performed by: ADVANCED PRACTICE MIDWIFE

## 2022-12-20 NOTE — PROGRESS NOTES
"Chief Complaint   Patient presents with    Routine Prenatal Visit       32 y.o. female  at 38w6d, by Estimated Date of Delivery: 22  Considered SVE--but after discussion declines and will plan to be checked next week.  Lives in [place], [place] and concerned about drive--reassured and discussed plan of care.   ON call CNM contact info reviewed.  Will likely consider 41 week IOL--did last time due to history of "big babies" (both almost 9lbs)\    Complaints today: Pubic bone pain. Doing well today.  Reviewed TW lbs    ROS  OBSTETRICS:   Contractions: Nothing regular   Bleeding No   Loss of fluid No   Fetal mvmnt present  GASTRO:   Nausea No   Vomiting No      OB History    Para Term  AB Living   4 2 2   1 2   SAB IAB Ectopic Multiple Live Births   1     0 2      # Outcome Date GA Lbr Tavares/2nd Weight Sex Delivery Anes PTL Lv   4 Current            3 Term 10/17/20 41w1d  3.884 kg (8 lb 9 oz) F Vag-Spont EPI N MATHEUS   2 SAB 2019           1 Term 17 41w0d  3.969 kg (8 lb 12 oz) F Vag-Spont   MATHEUS       Dating reviewed  Allergies and problem list reviewed and updated  Medical and surgical history reviewed  Prenatal labs reviewed and updated    PHYSICAL EXAM  /66   Wt 82.2 kg (181 lb 3.5 oz)   LMP 2022   BMI 33.15 kg/m²     GENERAL: No acute distress  HEENT: Normocephalic, atraumatic  NEURO: Alert and oriented x3  PSYCH: Normal mood and affect  PULMONARY: Non-labored respiration; no tachypnea  ABD: Soft, gravid, nontender.      ASSESSMENT AND PLAN     Problems (from 22 to present)     No problems associated with this episode.          Delivery consents already signed  Discussed plans for support people during labor - patient plans to have Duy .    Reviewed warning signs, normal FM, and how/when to call.      Follow-up: 1 week, call or present sooner PASTORA Barker CNM, MSN  2022  3:33 PM      "

## 2022-12-23 ENCOUNTER — HOSPITAL ENCOUNTER (INPATIENT)
Facility: OTHER | Age: 32
LOS: 2 days | Discharge: HOME | End: 2022-12-25
Attending: OBSTETRICS & GYNECOLOGY | Admitting: OBSTETRICS & GYNECOLOGY
Payer: COMMERCIAL

## 2022-12-23 ENCOUNTER — ANESTHESIA EVENT (OUTPATIENT)
Dept: OBSTETRICS AND GYNECOLOGY | Facility: OTHER | Age: 32
End: 2022-12-23
Payer: COMMERCIAL

## 2022-12-23 ENCOUNTER — ANESTHESIA (OUTPATIENT)
Dept: OBSTETRICS AND GYNECOLOGY | Facility: OTHER | Age: 32
End: 2022-12-23
Payer: COMMERCIAL

## 2022-12-23 DIAGNOSIS — Z20.822 CLOSE EXPOSURE TO COVID-19 VIRUS: ICD-10-CM

## 2022-12-23 DIAGNOSIS — Z37.9 NORMAL LABOR: Primary | ICD-10-CM

## 2022-12-23 DIAGNOSIS — Z3A.39 39 WEEKS GESTATION OF PREGNANCY: ICD-10-CM

## 2022-12-23 PROBLEM — R03.0 ELEVATED BP WITHOUT DIAGNOSIS OF HYPERTENSION: Status: ACTIVE | Noted: 2022-12-23

## 2022-12-23 LAB
ABO + RH BLD: NORMAL
ALBUMIN SERPL BCP-MCNC: 2.6 G/DL (ref 3.5–5.2)
ALP SERPL-CCNC: 185 U/L (ref 55–135)
ALT SERPL W/O P-5'-P-CCNC: 12 U/L (ref 10–44)
ANION GAP SERPL CALC-SCNC: 10 MMOL/L (ref 8–16)
AST SERPL-CCNC: 17 U/L (ref 10–40)
BASOPHILS # BLD AUTO: 0.04 K/UL (ref 0–0.2)
BASOPHILS NFR BLD: 0.3 % (ref 0–1.9)
BILIRUB SERPL-MCNC: 0.4 MG/DL (ref 0.1–1)
BLD GP AB SCN CELLS X3 SERPL QL: NORMAL
BUN SERPL-MCNC: 10 MG/DL (ref 6–20)
CALCIUM SERPL-MCNC: 8.5 MG/DL (ref 8.7–10.5)
CHLORIDE SERPL-SCNC: 110 MMOL/L (ref 95–110)
CO2 SERPL-SCNC: 19 MMOL/L (ref 23–29)
CREAT SERPL-MCNC: 0.6 MG/DL (ref 0.5–1.4)
CREAT UR-MCNC: 71.1 MG/DL (ref 15–325)
DIFFERENTIAL METHOD: ABNORMAL
EOSINOPHIL # BLD AUTO: 0.2 K/UL (ref 0–0.5)
EOSINOPHIL NFR BLD: 1.3 % (ref 0–8)
ERYTHROCYTE [DISTWIDTH] IN BLOOD BY AUTOMATED COUNT: 15.4 % (ref 11.5–14.5)
EST. GFR  (NO RACE VARIABLE): >60 ML/MIN/1.73 M^2
GLUCOSE SERPL-MCNC: 88 MG/DL (ref 70–110)
HCT VFR BLD AUTO: 36.7 % (ref 37–48.5)
HGB BLD-MCNC: 11.8 G/DL (ref 12–16)
IMM GRANULOCYTES # BLD AUTO: 0.07 K/UL (ref 0–0.04)
IMM GRANULOCYTES NFR BLD AUTO: 0.5 % (ref 0–0.5)
LYMPHOCYTES # BLD AUTO: 1.4 K/UL (ref 1–4.8)
LYMPHOCYTES NFR BLD: 10.4 % (ref 18–48)
MCH RBC QN AUTO: 26.3 PG (ref 27–31)
MCHC RBC AUTO-ENTMCNC: 32.2 G/DL (ref 32–36)
MCV RBC AUTO: 82 FL (ref 82–98)
MONOCYTES # BLD AUTO: 0.9 K/UL (ref 0.3–1)
MONOCYTES NFR BLD: 7.1 % (ref 4–15)
NEUTROPHILS # BLD AUTO: 10.6 K/UL (ref 1.8–7.7)
NEUTROPHILS NFR BLD: 80.4 % (ref 38–73)
NRBC BLD-RTO: 0 /100 WBC
PLATELET # BLD AUTO: 203 K/UL (ref 150–450)
PMV BLD AUTO: 11.6 FL (ref 9.2–12.9)
POTASSIUM SERPL-SCNC: 4 MMOL/L (ref 3.5–5.1)
PROT SERPL-MCNC: 6.2 G/DL (ref 6–8.4)
PROT UR-MCNC: 14 MG/DL (ref 0–15)
PROT/CREAT UR: 0.2 MG/G{CREAT} (ref 0–0.2)
RBC # BLD AUTO: 4.49 M/UL (ref 4–5.4)
SARS-COV-2 RDRP RESP QL NAA+PROBE: NEGATIVE
SODIUM SERPL-SCNC: 139 MMOL/L (ref 136–145)
WBC # BLD AUTO: 13.23 K/UL (ref 3.9–12.7)

## 2022-12-23 PROCEDURE — 99285 EMERGENCY DEPT VISIT HI MDM: CPT | Mod: 25

## 2022-12-23 PROCEDURE — 51702 INSERT TEMP BLADDER CATH: CPT

## 2022-12-23 PROCEDURE — 99284 PR EMERGENCY DEPT VISIT,LEVEL IV: ICD-10-PCS | Mod: 25,,, | Performed by: OBSTETRICS & GYNECOLOGY

## 2022-12-23 PROCEDURE — 63600175 PHARM REV CODE 636 W HCPCS: Performed by: STUDENT IN AN ORGANIZED HEALTH CARE EDUCATION/TRAINING PROGRAM

## 2022-12-23 PROCEDURE — 99284 EMERGENCY DEPT VISIT MOD MDM: CPT | Mod: 25,,, | Performed by: OBSTETRICS & GYNECOLOGY

## 2022-12-23 PROCEDURE — 72100002 HC LABOR CARE, 1ST 8 HOURS

## 2022-12-23 PROCEDURE — 59400 OBSTETRICAL CARE: CPT | Mod: GB,,, | Performed by: ADVANCED PRACTICE MIDWIFE

## 2022-12-23 PROCEDURE — U0002 COVID-19 LAB TEST NON-CDC: HCPCS | Performed by: ADVANCED PRACTICE MIDWIFE

## 2022-12-23 PROCEDURE — 59400 PRA FULL ROUT OBSTE CARE,VAGINAL DELIV: ICD-10-PCS | Mod: QY,,, | Performed by: ANESTHESIOLOGY

## 2022-12-23 PROCEDURE — 63600175 PHARM REV CODE 636 W HCPCS: Performed by: ADVANCED PRACTICE MIDWIFE

## 2022-12-23 PROCEDURE — 11000001 HC ACUTE MED/SURG PRIVATE ROOM

## 2022-12-23 PROCEDURE — 59400 PR FULL ROUT OBSTE CARE,VAGINAL DELIV: ICD-10-PCS | Mod: GB,,, | Performed by: ADVANCED PRACTICE MIDWIFE

## 2022-12-23 PROCEDURE — 72200004 HC VAGINAL DELIVERY LEVEL I

## 2022-12-23 PROCEDURE — 86850 RBC ANTIBODY SCREEN: CPT | Performed by: ADVANCED PRACTICE MIDWIFE

## 2022-12-23 PROCEDURE — 59025 FETAL NON-STRESS TEST: CPT | Mod: 26,,, | Performed by: OBSTETRICS & GYNECOLOGY

## 2022-12-23 PROCEDURE — 59025 PR FETAL 2N-STRESS TEST: ICD-10-PCS | Mod: 26,,, | Performed by: OBSTETRICS & GYNECOLOGY

## 2022-12-23 PROCEDURE — 84156 ASSAY OF PROTEIN URINE: CPT | Performed by: ADVANCED PRACTICE MIDWIFE

## 2022-12-23 PROCEDURE — 85025 COMPLETE CBC W/AUTO DIFF WBC: CPT | Performed by: ADVANCED PRACTICE MIDWIFE

## 2022-12-23 PROCEDURE — 25000003 PHARM REV CODE 250: Performed by: STUDENT IN AN ORGANIZED HEALTH CARE EDUCATION/TRAINING PROGRAM

## 2022-12-23 PROCEDURE — 59025 FETAL NON-STRESS TEST: CPT

## 2022-12-23 PROCEDURE — 80053 COMPREHEN METABOLIC PANEL: CPT | Performed by: ADVANCED PRACTICE MIDWIFE

## 2022-12-23 PROCEDURE — C1751 CATH, INF, PER/CENT/MIDLINE: HCPCS | Performed by: ANESTHESIOLOGY

## 2022-12-23 PROCEDURE — 27200710 HC EPIDURAL INFUSION PUMP SET: Performed by: ANESTHESIOLOGY

## 2022-12-23 PROCEDURE — 62326 NJX INTERLAMINAR LMBR/SAC: CPT | Performed by: STUDENT IN AN ORGANIZED HEALTH CARE EDUCATION/TRAINING PROGRAM

## 2022-12-23 PROCEDURE — 59400 OBSTETRICAL CARE: CPT | Mod: QY,,, | Performed by: ANESTHESIOLOGY

## 2022-12-23 RX ORDER — FENTANYL CITRATE 50 UG/ML
INJECTION, SOLUTION INTRAMUSCULAR; INTRAVENOUS
Status: COMPLETED
Start: 2022-12-23 | End: 2022-12-23

## 2022-12-23 RX ORDER — DIPHENHYDRAMINE HYDROCHLORIDE 50 MG/ML
25 INJECTION INTRAMUSCULAR; INTRAVENOUS EVERY 4 HOURS PRN
Status: DISCONTINUED | OUTPATIENT
Start: 2022-12-24 | End: 2022-12-25 | Stop reason: HOSPADM

## 2022-12-23 RX ORDER — OXYTOCIN/RINGER'S LACTATE 30/500 ML
95 PLASTIC BAG, INJECTION (ML) INTRAVENOUS ONCE
Status: DISCONTINUED | OUTPATIENT
Start: 2022-12-24 | End: 2022-12-25 | Stop reason: HOSPADM

## 2022-12-23 RX ORDER — CALCIUM CARBONATE 200(500)MG
500 TABLET,CHEWABLE ORAL 3 TIMES DAILY PRN
Status: DISCONTINUED | OUTPATIENT
Start: 2022-12-23 | End: 2022-12-24

## 2022-12-23 RX ORDER — HYDROCORTISONE 25 MG/G
CREAM TOPICAL 3 TIMES DAILY PRN
Status: DISCONTINUED | OUTPATIENT
Start: 2022-12-24 | End: 2022-12-25 | Stop reason: HOSPADM

## 2022-12-23 RX ORDER — FENTANYL CITRATE 50 UG/ML
INJECTION, SOLUTION INTRAMUSCULAR; INTRAVENOUS
Status: DISCONTINUED | OUTPATIENT
Start: 2022-12-23 | End: 2022-12-23

## 2022-12-23 RX ORDER — PRENATAL WITH FERROUS FUM AND FOLIC ACID 3080; 920; 120; 400; 22; 1.84; 3; 20; 10; 1; 12; 200; 27; 25; 2 [IU]/1; [IU]/1; MG/1; [IU]/1; MG/1; MG/1; MG/1; MG/1; MG/1; MG/1; UG/1; MG/1; MG/1; MG/1; MG/1
1 TABLET ORAL DAILY
Status: DISCONTINUED | OUTPATIENT
Start: 2022-12-24 | End: 2022-12-25 | Stop reason: HOSPADM

## 2022-12-23 RX ORDER — BUPIVACAINE HYDROCHLORIDE 2.5 MG/ML
INJECTION, SOLUTION INFILTRATION; PERINEURAL
Status: DISCONTINUED | OUTPATIENT
Start: 2022-12-23 | End: 2022-12-23

## 2022-12-23 RX ORDER — FENTANYL/BUPIVACAINE/NS/PF 2MCG/ML-.1
PLASTIC BAG, INJECTION (ML) INJECTION CONTINUOUS
Status: DISCONTINUED | OUTPATIENT
Start: 2022-12-23 | End: 2022-12-24

## 2022-12-23 RX ORDER — TRANEXAMIC ACID 100 MG/ML
INJECTION, SOLUTION INTRAVENOUS
Status: DISCONTINUED
Start: 2022-12-23 | End: 2022-12-24 | Stop reason: WASHOUT

## 2022-12-23 RX ORDER — METHYLERGONOVINE MALEATE 0.2 MG/ML
INJECTION INTRAVENOUS
Status: DISCONTINUED
Start: 2022-12-23 | End: 2022-12-24 | Stop reason: WASHOUT

## 2022-12-23 RX ORDER — FENTANYL/BUPIVACAINE/NS/PF 2MCG/ML-.1
PLASTIC BAG, INJECTION (ML) INJECTION CONTINUOUS PRN
Status: DISCONTINUED | OUTPATIENT
Start: 2022-12-23 | End: 2022-12-23

## 2022-12-23 RX ORDER — NALBUPHINE HYDROCHLORIDE 10 MG/ML
2.5 INJECTION, SOLUTION INTRAMUSCULAR; INTRAVENOUS; SUBCUTANEOUS ONCE AS NEEDED
Status: DISCONTINUED | OUTPATIENT
Start: 2022-12-23 | End: 2022-12-24

## 2022-12-23 RX ORDER — LIDOCAINE HYDROCHLORIDE AND EPINEPHRINE 15; 5 MG/ML; UG/ML
INJECTION, SOLUTION EPIDURAL
Status: DISCONTINUED | OUTPATIENT
Start: 2022-12-23 | End: 2022-12-23

## 2022-12-23 RX ORDER — SODIUM CITRATE AND CITRIC ACID MONOHYDRATE 334; 500 MG/5ML; MG/5ML
30 SOLUTION ORAL ONCE
Status: DISCONTINUED | OUTPATIENT
Start: 2022-12-23 | End: 2022-12-24

## 2022-12-23 RX ORDER — ONDANSETRON 8 MG/1
8 TABLET, ORALLY DISINTEGRATING ORAL EVERY 8 HOURS PRN
Status: DISCONTINUED | OUTPATIENT
Start: 2022-12-24 | End: 2022-12-25 | Stop reason: HOSPADM

## 2022-12-23 RX ORDER — SODIUM CHLORIDE 0.9 % (FLUSH) 0.9 %
10 SYRINGE (ML) INJECTION
Status: DISCONTINUED | OUTPATIENT
Start: 2022-12-24 | End: 2022-12-25 | Stop reason: HOSPADM

## 2022-12-23 RX ORDER — PROCHLORPERAZINE EDISYLATE 5 MG/ML
5 INJECTION INTRAMUSCULAR; INTRAVENOUS EVERY 6 HOURS PRN
Status: DISCONTINUED | OUTPATIENT
Start: 2022-12-24 | End: 2022-12-25 | Stop reason: HOSPADM

## 2022-12-23 RX ORDER — OXYTOCIN/RINGER'S LACTATE 30/500 ML
334 PLASTIC BAG, INJECTION (ML) INTRAVENOUS ONCE
Status: COMPLETED | OUTPATIENT
Start: 2022-12-23 | End: 2022-12-23

## 2022-12-23 RX ORDER — MISOPROSTOL 200 UG/1
800 TABLET ORAL
Status: DISCONTINUED | OUTPATIENT
Start: 2022-12-23 | End: 2022-12-24

## 2022-12-23 RX ORDER — OXYTOCIN/RINGER'S LACTATE 30/500 ML
95 PLASTIC BAG, INJECTION (ML) INTRAVENOUS ONCE
Status: DISCONTINUED | OUTPATIENT
Start: 2022-12-23 | End: 2022-12-24

## 2022-12-23 RX ORDER — ACETAMINOPHEN 325 MG/1
650 TABLET ORAL EVERY 6 HOURS PRN
Status: DISCONTINUED | OUTPATIENT
Start: 2022-12-24 | End: 2022-12-25 | Stop reason: HOSPADM

## 2022-12-23 RX ORDER — FENTANYL/BUPIVACAINE/NS/PF 2MCG/ML-.1
PLASTIC BAG, INJECTION (ML) INJECTION
Status: COMPLETED
Start: 2022-12-23 | End: 2022-12-23

## 2022-12-23 RX ORDER — SIMETHICONE 80 MG
1 TABLET,CHEWABLE ORAL 4 TIMES DAILY PRN
Status: DISCONTINUED | OUTPATIENT
Start: 2022-12-23 | End: 2022-12-24

## 2022-12-23 RX ORDER — SIMETHICONE 80 MG
1 TABLET,CHEWABLE ORAL EVERY 6 HOURS PRN
Status: DISCONTINUED | OUTPATIENT
Start: 2022-12-24 | End: 2022-12-25 | Stop reason: HOSPADM

## 2022-12-23 RX ORDER — DIPHENOXYLATE HYDROCHLORIDE AND ATROPINE SULFATE 2.5; .025 MG/1; MG/1
1 TABLET ORAL 4 TIMES DAILY PRN
Status: DISCONTINUED | OUTPATIENT
Start: 2022-12-23 | End: 2022-12-24

## 2022-12-23 RX ORDER — LIDOCAINE HYDROCHLORIDE 10 MG/ML
10 INJECTION INFILTRATION; PERINEURAL ONCE AS NEEDED
Status: DISCONTINUED | OUTPATIENT
Start: 2022-12-23 | End: 2022-12-24

## 2022-12-23 RX ORDER — PROCHLORPERAZINE EDISYLATE 5 MG/ML
5 INJECTION INTRAMUSCULAR; INTRAVENOUS EVERY 6 HOURS PRN
Status: DISCONTINUED | OUTPATIENT
Start: 2022-12-23 | End: 2022-12-24

## 2022-12-23 RX ORDER — DOCUSATE SODIUM 100 MG/1
200 CAPSULE, LIQUID FILLED ORAL 2 TIMES DAILY PRN
Status: DISCONTINUED | OUTPATIENT
Start: 2022-12-24 | End: 2022-12-25 | Stop reason: HOSPADM

## 2022-12-23 RX ORDER — CARBOPROST TROMETHAMINE 250 UG/ML
250 INJECTION, SOLUTION INTRAMUSCULAR
Status: DISCONTINUED | OUTPATIENT
Start: 2022-12-23 | End: 2022-12-24

## 2022-12-23 RX ORDER — TRANEXAMIC ACID 100 MG/ML
1000 INJECTION, SOLUTION INTRAVENOUS ONCE AS NEEDED
Status: DISCONTINUED | OUTPATIENT
Start: 2022-12-23 | End: 2022-12-24

## 2022-12-23 RX ORDER — SODIUM CHLORIDE 9 MG/ML
INJECTION, SOLUTION INTRAVENOUS
Status: DISCONTINUED | OUTPATIENT
Start: 2022-12-23 | End: 2022-12-24

## 2022-12-23 RX ORDER — DIPHENHYDRAMINE HCL 25 MG
25 CAPSULE ORAL EVERY 4 HOURS PRN
Status: DISCONTINUED | OUTPATIENT
Start: 2022-12-24 | End: 2022-12-25 | Stop reason: HOSPADM

## 2022-12-23 RX ORDER — MISOPROSTOL 200 UG/1
TABLET ORAL
Status: DISCONTINUED
Start: 2022-12-23 | End: 2022-12-24 | Stop reason: WASHOUT

## 2022-12-23 RX ORDER — ONDANSETRON 8 MG/1
8 TABLET, ORALLY DISINTEGRATING ORAL EVERY 8 HOURS PRN
Status: DISCONTINUED | OUTPATIENT
Start: 2022-12-23 | End: 2022-12-24

## 2022-12-23 RX ORDER — IBUPROFEN 600 MG/1
600 TABLET ORAL EVERY 6 HOURS PRN
Status: DISCONTINUED | OUTPATIENT
Start: 2022-12-24 | End: 2022-12-25 | Stop reason: HOSPADM

## 2022-12-23 RX ADMIN — Medication 334 MILLI-UNITS/MIN: at 11:12

## 2022-12-23 RX ADMIN — BUPIVACAINE HYDROCHLORIDE 1 ML: 2.5 INJECTION, SOLUTION INFILTRATION; PERINEURAL at 09:12

## 2022-12-23 RX ADMIN — LIDOCAINE HYDROCHLORIDE,EPINEPHRINE BITARTRATE 3 ML: 15; .005 INJECTION, SOLUTION EPIDURAL; INFILTRATION; INTRACAUDAL; PERINEURAL at 09:12

## 2022-12-23 RX ADMIN — FENTANYL CITRATE 10 MCG: 0.05 INJECTION, SOLUTION INTRAMUSCULAR; INTRAVENOUS at 09:12

## 2022-12-23 RX ADMIN — Medication 10 ML/HR: at 10:12

## 2022-12-23 NOTE — Clinical Note
I certify that Inpatient services for greater than or equal to 2 midnights are medically necessary:: No   Transfer To (Destination): [facility] LABOR AND DELIVERY [[id number]]   Diagnosis: Normal labor [461312]   Admitting Provider:: JESSICA MAO [[id number]]   Future Attending Provider: JESSICA MAO [[id number]]   Reason for IP Medical Treatment  (Clinical interventions that can only be accomplished in the IP setting? ) :: Normal Labor   Estimated Length of Stay:: 2 midnights   Plans for Post-Acute care--if anticipated (pick the single best option):: A. No post acute care anticipated at this time   Special Needs:: No Special Needs [1]

## 2022-12-24 PROCEDURE — 11000001 HC ACUTE MED/SURG PRIVATE ROOM

## 2022-12-24 PROCEDURE — 25000003 PHARM REV CODE 250: Performed by: ADVANCED PRACTICE MIDWIFE

## 2022-12-24 RX ADMIN — DOCUSATE SODIUM 200 MG: 100 CAPSULE, LIQUID FILLED ORAL at 09:12

## 2022-12-24 RX ADMIN — ACETAMINOPHEN 650 MG: 325 TABLET, FILM COATED ORAL at 01:12

## 2022-12-24 RX ADMIN — IBUPROFEN 600 MG: 600 TABLET, FILM COATED ORAL at 08:12

## 2022-12-24 RX ADMIN — IBUPROFEN 600 MG: 600 TABLET, FILM COATED ORAL at 09:12

## 2022-12-24 RX ADMIN — DOCUSATE SODIUM 200 MG: 100 CAPSULE, LIQUID FILLED ORAL at 08:12

## 2022-12-24 RX ADMIN — IBUPROFEN 600 MG: 600 TABLET, FILM COATED ORAL at 02:12

## 2022-12-24 RX ADMIN — IBUPROFEN 600 MG: 600 TABLET, FILM COATED ORAL at 04:12

## 2022-12-24 RX ADMIN — PRENATAL VIT W/ FE FUMARATE-FA TAB 27-0.8 MG 1 TABLET: 27-0.8 TAB at 08:12

## 2022-12-24 NOTE — DISCHARGE INSTRUCTIONS
Discharge Instructions    The AAP recommends exclusive breastfeeding for about the first 6 months of age and as solid foods are introduced, continued breastfeeding  for at least 2 years or longer.    Feed the baby at the earliest sign of hunger or comfort (see signs on the back cover of the Mother's Breastfeeding Guide)  Hands to mouth, sucking motions  Rooting or searching for something to suck on  Dont wait for crying - it is a sign of distress    The feedings may be 8-12 times per 24hrs and will not follow a schedule  Avoid pacifiers and bottles for the first 4 weeks  Alternate the breast you start the feeding with, or start with the breast that feels the fullest  Switch breasts when the baby takes himself off the breast or falls asleep  Keep offering breasts until the baby looks full, no longer gives hunger signs, and stays asleep when placed on his back in the crib  If the baby is sleepy and wont wake for a feeding, put the baby skin-to-skin dressed in a diaper against the mothers bare chest  Sleep with your baby in your room near you  The baby should be positioned and latched on to the breast correctly  Chest-to-chest, chin in the breast  Babys lips are flipped outward  Babys mouth is stretched open wide like a shout  Babys sucking should feel like tugging to the mother  The baby should be drinking at the breast:  You should hear swallowing or gulping throughout the feeding  You should see milk on the babys lips when he comes off the breast  Your breasts should be softer when the baby is finished feeding  The baby should look relaxed at the end of feedings  After the 4th day and your milk is in:  The babys poop should turn bright yellow and be loose, watery, and seedy  The baby should have at least 3-4 poops the size of the palm of your hand per day  The baby should have at least 5-6 wet diapers per day  The urine should be light yellow in color  You should drink when you are thirsty and eat a  healthy diet when you are    hungry.     Take naps to get the rest you need.   Take medications and/or drink alcohol only with permission of your obstetrician    or the babys pediatrician.  You can also call the Infant Risk Center,   ([phone]), Monday-Friday, 8am-5pm Central time, to get the most   up-to-date evidence-based information on the use of medications during   pregnancy and breastfeeding.      Complete the First Alert form in the Mother's Breastfeeding Guide 3-5 days after the baby's birth.  Please call the Breastfeeding Warmline ([phone]) or the baby's pediatrician if you have any concerns.    The baby should be examined by a pediatrician at 3-5 days of age and again at 2 weeks of age.  Once your milk production increases, the baby should be gaining at least ½ - 1oz each day and should be back to birthweight no later than 10-14 days of age.  If this is not the case, please call the Breastfeeding Warmline ([phone]) for assistance and support.    Community Resources    [facility] Breastfeeding Warmline: [phone]   Local [facility] clinics: provide incentives and breastpumps to eligible mothers 4-012-271-BABY  La Leche League International (LLLI):  mother-to-mother support group website        www.lll."[employer]"  [facility] La Leche League mother-to-mother support groups:        www.lllDirectPointe.U4EA        La Leche League [facility]   Dr. Kian Ruiz website for latch videos and general information:        www.breastfeedinginc.ca  Infant Risk Center is a call center that provides information about the safety of taking medications while breastfeeding.  Call 4-790-667-7581, M-F, 8am-5pm, [place].  International Lactation Consultant Association provides resources for assistance:        www.ilca.org  [place] Breastfeeding Coalition provides informationand resources for parents  and the community    www.LaBreastfeedingSupport.org  Leti Terry is a mom-to-mom support group:                              www.nolanesting.com//breastfeedng-support/  [employer] for Healthy Babies:  2-867-482-BABY(9626)  [employer]: a breastfeeding support group for women of color, 877.737.9872

## 2022-12-24 NOTE — ED PROVIDER NOTES
Encounter Date: 2022       History     Chief Complaint   Patient presents with    Contractions     History of Present Illness:   Catie Pendleton is a 32 y.o. female  at 39w2d with Estimated Date of Delivery: 22 based on 1st trimester sonogram who presents to Labor and Delivery accompanied by  . Expecting a Boy[place]!    Reports regular uterine contractions since this afternoon. They are now 3-4 minutes apart and increasing in intensity and duration.  strong contractions, active fetal movement, No vaginal bleeding , No loss of fluid.     Currently drinking water and last ate an hour ago.    This pregnancy has been complicated by :  Patient Active Problem List:     Intramural leiomyoma of uterus     Migraine without aura, not intractable, without status migrainosus     Vulvar varices during pregnancy     Lactating mother     COVID-19 affecting pregnancy in first trimester     Exposure to varicella zoster virus (VZV)     Birth Center Risk Assessment: 0- Meets birth center guidelines     Pregnancy       Presentation: vertex  Estimated Fetal Weight: 7.5lbs      Birth Center Risk Assessment: 1-CNM management on L&D (Desires tub, might want epidural)  CNM management in ABC  CNM management on L&D  Consultation with OB to develop  plan of care  Collaborative CNM/OB management with delivery on L&D           Review of patient's allergies indicates:   Allergen Reactions    Clindamycin Itching and Rash    Morphine Rash    Penicillins Hives and Rash     She was a baby and was told that she was allergic.    Sulfa (sulfonamide antibiotics) Rash     Past Medical History:   Diagnosis Date    Anal fissure 2020    Given suppositories (anusol to start)--f/u at 6week visit. 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    Migraine      Past Surgical History:   Procedure Laterality Date    OVARIAN CYST REMOVAL Right      Family  History   Problem Relation Age of Onset    Colon polyps Mother         Precancerous - Were removed    Hypertension Mother     Hyperlipidemia Mother     Patel's esophagus Mother     Hypertension Father     Hyperlipidemia Father     Diabetes Father     Melanoma Father     Lung cancer Maternal Grandfather     Diabetes Paternal Grandmother     Emphysema Paternal Grandfather         Was a smoker    Skin cancer Paternal Grandfather         Not sure which kind of skin cancer    Breast cancer Paternal Aunt 67    Ovarian cancer Neg Hx      Social History     Tobacco Use    Smoking status: Never    Smokeless tobacco: Never   Substance Use Topics    Alcohol use: Not Currently    Drug use: Never     Review of Systems   Constitutional:  Negative for chills and fever.   HENT: Negative.  Negative for congestion.         Denies cough, sore throat or congestion. Two children at home are Covid positive ,  is covid positive and present in room.     Eyes:  Negative for visual disturbance.   Respiratory:  Negative for shortness of breath.    Cardiovascular:  Negative for chest pain and palpitations.   Gastrointestinal:  Positive for abdominal pain (contractions). Negative for diarrhea, nausea and vomiting.        No contractions   Endocrine: Negative.    Genitourinary:  Negative for dysuria, genital sores, vaginal bleeding and vaginal discharge.        Denies vaginal bleeding or leaking fluid.    Musculoskeletal: Negative.    Skin:  Negative for rash.   Allergic/Immunologic: Negative.    Neurological:  Negative for dizziness, syncope and headaches.   Psychiatric/Behavioral:  Negative for agitation, behavioral problems and suicidal ideas. The patient is not nervous/anxious.      Physical Exam     Initial Vitals   BP Pulse Resp Temp SpO2   12/23/22 2200 12/23/22 2200 12/24/22 0236 12/23/22 2245 12/23/22 2215   (!) 112/58 75 18 98 °F (36.7 °C) 97 %      MAP       --              BP: 142/81, FIRST 146/87 (added Pre-E  labs)  Physical Exam    Vitals reviewed.  Constitutional: She appears well-developed and well-nourished. She is not diaphoretic.   HENT:   Head: Normocephalic and atraumatic.   Eyes: Conjunctivae are normal.   Neck: Neck supple.   Normal range of motion.  Cardiovascular:  Normal rate, regular rhythm and normal heart sounds.           Pulmonary/Chest: Breath sounds normal.   Abdominal: Abdomen is soft. Bowel sounds are normal. There is no abdominal tenderness. There is no rebound and no guarding.   Genitourinary:    Vagina and uterus normal.      Genitourinary Comments: SVE 5/80/-2/vertex/posterior/ soft  Intact BOW, Vertex       Musculoskeletal:         General: Normal range of motion.      Cervical back: Normal range of motion and neck supple.     Neurological: She is alert and oriented to person, place, and time. She has normal strength.   Skin: Skin is warm and dry.   Psychiatric: She has a normal mood and affect. Her behavior is normal. Judgment and thought content normal.       ED Course   Fetal non-stress test    Date/Time: 12/23/2022 8:38 PM  Performed by: Rajani Barker CNM  Authorized by: Rajani Barker CNM     Nonstress Test:     Variability:  6-25 BPM    Decelerations:  None    Accelerations:  15 bpm    Baseline:  135    Uterine Irritability: No      Contractions:  Regular    Contraction Frequency:  3  Biophysical Profile:     Nonstress Test Interpretation: reactive      Overall Impression:  Reassuring  Post-procedure:     Patient tolerance:  Patient tolerated the procedure well with no immediate complications  Labs Reviewed   COMPREHENSIVE METABOLIC PANEL - Abnormal; Notable for the following components:       Result Value    CO2 19 (*)     Calcium 8.5 (*)     Albumin 2.6 (*)     Alkaline Phosphatase 185 (*)     All other components within normal limits   CBC W/ AUTO DIFFERENTIAL - Abnormal; Notable for the following components:    WBC 13.23 (*)     Hemoglobin 11.8 (*)     Hematocrit 36.7 (*)     MCH  26.3 (*)     RDW 15.4 (*)     Gran # (ANC) 10.6 (*)     Immature Grans (Abs) 0.07 (*)     Gran % 80.4 (*)     Lymph % 10.4 (*)     All other components within normal limits   SARS-COV-2 RNA AMPLIFICATION, QUAL          Imaging Results    None          Medications   oxytocin 30 units in 500 mL lactated ringers infusion (non-titrating) (20 chloé-units/min Intravenous Rate/Dose Change 22 3328)   fentaNYL (SUBLIMAZE) 50 mcg/mL injection (  Override pull for Anesthesia 22)   fentanyl 2mcg/mL with BUPivacaine 0.1% in sdoium chloride 0.9% Epidural 2 mcg/mL- 0.1 % Soln (  Override pull for Anesthesia 22)     Medical Decision Making:   Initial Assessment:   Normal Labor  High Risk for COVID (2 children at home are POS/  likely pos and present)  Desires NCB-wants to try tub, may opt for epidural (patient ok with L&D)  Dr. Azul to be updated--agrees with plan of care.          Attending Attestation:   Physician Attestation Statement for Resident:  As the supervising MD   Physician Attestation Statement: I have personally seen and examined this patient.   I agree with the above history.  -:   As the supervising MD I agree with the above PE.     As the supervising MD I agree with the above treatment, course, plan, and disposition.   -: NST reactive and reassuring, toco with contractions every 3 minutes.  Exam c/w labor.  Will admit to L&D.  Anticipate .  I was personally present during the critical portions of the procedure(s) performed by the resident and was immediately available in the ED to provide services and assistance as needed during the entire procedure.   I have reviewed the following: old records at this facility.                           Clinical Impression:     Diagnosis     1. Normal labor    2. Close exposure to COVID-19 virus    3.  (normal spontaneous vaginal delivery)    4. 39 weeks gestation of pregnancy      Expectant management  Covid test collected   ED  Disposition Condition    Send to L&D                 Rajani Barker CNM  12/23/22 2044       Rajani Barker CNM  12/23/22 2044       Kimberlee Azul MD  12/26/22 [id number]

## 2022-12-24 NOTE — LACTATION NOTE
12/24/22 1720   Maternal Assessment   Breast Shape Bilateral:;round   Breast Density Bilateral:;soft   Areola Bilateral:;elastic   Nipples Bilateral:;everted   Maternal Infant Feeding   Infant Positioning cradle   Signs of Milk Transfer audible swallow;infant jaw motion present   Pain with Feeding no   Nipple Shape After Feeding, Right round   Latch Assistance no   Breast Pumping   Breast Pumping hand expression utilized   Community Referrals   Community Referrals outpatient lactation program;pediatric care provider;support group     Lactation note:  Reviewed first day expectations for breastfeeding using the breastfeeding guide with family. Discussed feeding cues for baby and adequacy/importance of feeding colostrum the first few days of life. Babies should eat often, 8 or more times in 24 hours, with these cues until they are content.Mom independent with nursing infant; showed her how to hand express colostrum and feed baby with a spoon. Mom waiting on first void but several stools since delivery.  phone number placed on board for further questions or assistance as needed.

## 2022-12-24 NOTE — L&D DELIVERY NOTE
[facility] - Labor & Delivery  Vaginal Delivery   Operative Note    SUMMARY     Normal spontaneous vaginal delivery of live infant male at 2306, was placed on mothers abdomen for skin to skin and bulb suctioning performed.  Infant delivered position OA over intact perineum.  Nuchal cord: No.    Spontaneous delivery of placenta ballard mechanism and IV pitocin given noting good uterine tone.  1st degree laceration noted.  Patient tolerated delivery well. Sponge needle and lap counted correctly x2. EBL 200cc.    Indications: Normal labor  Pregnancy complicated by:   Patient Active Problem List   Diagnosis    Intramural leiomyoma of uterus    Migraine without aura, not intractable, without status migrainosus    Vulvar varices during pregnancy    Lactating mother    COVID-19 affecting pregnancy in first trimester    Exposure to varicella zoster virus (VZV)    Birth Center Risk Assessment: 0- Meets birth center guidelines    Pregnancy    Close exposure to COVID-19 virus    Normal labor    Elevated BP without diagnosis of hypertension     Admitting GA: 39w2d    Delivery Information for Oniel Pendleton    Birth information:  YOB: 2022   Time of birth: 11:06 PM   Sex: male   Head Delivery Date/Time: 12/23/2022 11:06 PM   Delivery type: Vaginal, Spontaneous   Gestational Age: 39w2d    Delivery Providers    Delivering clinician: Rajani Barker CNM   Provider Role    Gissell Olivera RN               Measurements    Weight:   Length:          Apgars    Living status: Living  Apgars:  1 min.:  5 min.:  10 min.:  15 min.:  20 min.:    Skin color:  0  1       Heart rate:  2  2       Reflex irritability:  2  2       Muscle tone:  2  2       Respiratory effort:  2  2       Total:  8  9       Apgars assigned by: TRACEY OLIVERA RNC-OB         Operative Delivery    Forceps attempted?: No  Vacuum extractor attempted?: No         Shoulder Dystocia    Shoulder dystocia present?: No           Presentation     Presentation: Vertex  Position: Middle Occiput Anterior           Interventions/Resuscitation    Method: Bulb Suctioning, Tactile Stimulation       Cord    Vessels: 3 vessels  Complications: None  Delayed Cord Clamping?: Yes  Cord Clamped Date/Time: 2022 11:09 PM  Cord Blood Disposition: Lab  Gases Sent?: No  Stem Cell Collection (by MD): No       Placenta    Placenta delivery date/time: 2022 2318  Placenta removal: Spontaneous  Placenta appearance: Intact  Placenta disposition: discarded           Labor Events:       labor: No     Labor Onset Date/Time: 2022 12:00     Dilation Complete Date/Time: 2022 23:00     Start Pushing Date/Time:         Start Pushing Date/Time:       Rupture Date/Time:            Rupture type:            Fluid Amount:         Fluid Color:         Fluid Odor:         Membrane Status:                  steroids: None     Antibiotics given for GBS: No     Induction:       Indications for induction:        Augmentation:       Indications for augmentation:       Labor complications: None     Additional complications:          Cervical ripening:                     Delivery:      Episiotomy: None     Indication for Episiotomy:       Perineal Lacerations: 1st Repaired:  Yes   Periurethral Laceration:   Repaired:     Labial Laceration:   Repaired:     Sulcus Laceration:   Repaired:     Vaginal Laceration:   Repaired:     Cervical Laceration:   Repaired:     Repair suture:       Repair # of packets: 1     Last Value - EBL - Nursing (mL):       Sum - EBL - Nursing (mL): 0     Last Value - EBL - Anesthesia (mL):        Calculated QBL (mL): 200        Vaginal Sweep Performed: No     Surgicount Correct: Yes       Other providers:       Anesthesia    Method: Epidural          Details (if applicable):  Trial of Labor      Categorization:      Priority:     Indications for :     Incision Type:       Additional   information:  Forceps:    Vacuum:    Breech:    Observed anomalies    Other (Comments): Patient called out with pressure and was complete and on the perineum. NICU immediately called along with other delivery staff. Patient pushed 3 times to spontaneously deliver live infant male in OA position, rotated to LOT and anterior shoudler deli  jeremy easily with maternal push and mild traction, followed by posterior shoulder and full expulsion. Infant immediately began to cry and had great tone--therefore left on maternal abdomen and cleaned and dried placed skin to skin. Cord double clampe  d and cut after 3 min of delay. Cord blood collected. 1st degree laceration noted and repaired with 3.0 vicryl in usual fashion under epidural anesthesia. Placenta spontaneously delivered via ballard mechanism, intact, 3v cord no gross anomalies, dis   carded. Pitocin IV given per protocol. Fundus firm and bleeding minimal. EBL 200cc. Patient and infant left in stable condition.

## 2022-12-24 NOTE — PLAN OF CARE
Lactation note:  Lactation consultant's first visit with mother. Reviewed first day expectations for breastfeeding using the breastfeeding guide. The mother will feed infant with cues 8 or more times in 24 hours until content. Colostrum is adequate and important to feed baby the first few days of life. Mother independently nursing baby and showed her how to hand express after nursing. Left  phone number on board for patient to call for assistance.

## 2022-12-24 NOTE — ANESTHESIA PREPROCEDURE EVALUATION
[facility]  Anesthesia Pre-Operative Evaluation         Patient Name: Catie Pendleton  YOB: 1990  MRN: [id number]    2022      Catie Pendleton is a 32 y.o. female  @ 39w2d who presents in active labor. She denies any complications this pregnancy. Her previous pregnancy culminated in  w/epidural. She has a history of scoliosis. She denies HTN, DM, asthma, coagulopathies.     OB History    Para Term  AB Living   4 2 2   1 2   SAB IAB Ectopic Multiple Live Births   1     0 2      # Outcome Date GA Lbr [place]/2nd Weight Sex Delivery Anes PTL Lv   4 Current            3 Term 10/17/20 41w1d  3.884 kg (8 lb 9 oz) F Vag-Spont EPI N MATHEUS   2 SAB 2019           1 Term 17 41w0d  3.969 kg (8 lb 12 oz) F Vag-Spont   MATHEUS       Review of patient's allergies indicates:   Allergen Reactions    Clindamycin Itching and Rash    Morphine Rash    Penicillins Hives and Rash     She was a baby and was told that she was allergic.    Sulfa (sulfonamide antibiotics) Rash       Wt Readings from Last 1 Encounters:   22 1501 82.2 kg (181 lb 3.5 oz)       BP Readings from Last 3 Encounters:   22 110/66   22 110/64   22 108/68       Patient Active Problem List   Diagnosis    Intramural leiomyoma of uterus    Migraine without aura, not intractable, without status migrainosus    Vulvar varices during pregnancy    Lactating mother    COVID-19 affecting pregnancy in first trimester    Exposure to varicella zoster virus (VZV)    Birth Center Risk Assessment: 0- Meets birth center guidelines    Pregnancy    Close exposure to COVID-19 virus    Normal labor       Past Surgical History:   Procedure Laterality Date    OVARIAN CYST REMOVAL Right        Social History     Socioeconomic History    Marital status:      Spouse name: Duy   Tobacco Use    Smoking status: Never    Smokeless tobacco: Never    Substance and Sexual Activity    Alcohol use: Not Currently    Drug use: Never    Sexual activity: Yes     Partners: Male     Birth control/protection: None     Comment: Duy   Social History Narrative    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        Duy works at [employer].        This will be Catie and Duy's third baby together!        No cats at home.         Chemistry        Component Value Date/Time     12/23/2022 2044    K 4.0 12/23/2022 2044     12/23/2022 2044    CO2 19 (L) 12/23/2022 2044    BUN 10 12/23/2022 2044    CREATININE 0.6 12/23/2022 2044    GLU 88 12/23/2022 2044        Component Value Date/Time    CALCIUM 8.5 (L) 12/23/2022 2044    ALKPHOS 185 (H) 12/23/2022 2044    AST 17 12/23/2022 2044    ALT 12 12/23/2022 2044    BILITOT 0.4 12/23/2022 2044    ESTGFRAFRICA >60 10/12/2020 1501    EGFRNONAA >60 10/12/2020 1501            Lab Results   Component Value Date    WBC 13.23 (H) 12/23/2022    HGB 11.8 (L) 12/23/2022    HCT 36.7 (L) 12/23/2022    MCV 82 12/23/2022     12/23/2022       No results for input(s): PT, INR, PROTIME, APTT in the last 72 hours.            Pre-op Assessment    I have reviewed the Patient Summary Reports.       I have reviewed the Medications.     Review of Systems  Anesthesia Hx:  No previous Anesthesia  Neg history of prior surgery. Denies Family Hx of Anesthesia complications.    Social:  Non-Smoker    Hematology/Oncology:  Hematology Normal        Cardiovascular:  Cardiovascular Normal     Pulmonary:  Pulmonary Normal    Renal/:  Renal/ Normal     Hepatic/GI:  Hepatic/GI Normal    Musculoskeletal:  Musculoskeletal Normal    Neurological:  Neurology Normal    Endocrine:  Endocrine Normal        Physical Exam  General: Well nourished and Cooperative    Airway:  Mallampati: II / I      Dental:  Intact        Anesthesia Plan  Type of Anesthesia, risks & benefits discussed:    Anesthesia Type: Gen ETT, Epidural  Intra-op  Monitoring Plan: Standard ASA Monitors  Post Op Pain Control Plan: multimodal analgesia  Induction:  IV  Airway Plan: Video, Post-Induction  Informed Consent: Informed consent signed with the Patient and all parties understand the risks and agree with anesthesia plan.  All questions answered.   ASA Score: 2  Day of Surgery Review of History & Physical: H&P Update referred to the surgeon/provider.    Ready For Surgery From Anesthesia Perspective.     .

## 2022-12-24 NOTE — HPI
ncounter Date: 2022       History     Chief Complaint   Patient presents with    Contractions     History of Present Illness:   Catie Pendleton is a 32 y.o. female  at 39w2d with Estimated Date of Delivery: 22 based on 1st trimester sonogram who presents to Labor and Delivery accompanied by  . Expecting a Boy-[place]!    Reports regular uterine contractions since this afternoon. They are now 3-4 minutes apart and increasing in intensity and duration.  strong contractions, active fetal movement, No vaginal bleeding , No loss of fluid.     Currently drinking water and last ate an hour ago.    This pregnancy has been complicated by :  Patient Active Problem List:     Intramural leiomyoma of uterus     Migraine without aura, not intractable, without status migrainosus     Vulvar varices during pregnancy     Lactating mother     COVID-19 affecting pregnancy in first trimester     Exposure to varicella zoster virus (VZV)     Birth Center Risk Assessment: 0- Meets birth center guidelines     Pregnancy       Presentation: vertex  Estimated Fetal Weight: 7.5lbs      Birth Center Risk Assessment: 1-CNM management on L&D (Desires tub, might want epidural)  CNM management in ABC  CNM management on L&D  Consultation with OB to develop  plan of care  Collaborative CNM/OB management with delivery on L&D

## 2022-12-24 NOTE — ANESTHESIA POSTPROCEDURE EVALUATION
Anesthesia Post Evaluation    Patient: Catie Pendleton    Procedure(s) Performed: * No procedures listed *    Final Anesthesia Type: CSE      Patient location during evaluation: labor & delivery  Patient participation: Yes- Able to Participate  Level of consciousness: awake and alert and oriented  Post-procedure vital signs: reviewed and stable  Pain management: adequate  Airway patency: patent    PONV status at discharge: No PONV  Anesthetic complications: no      Cardiovascular status: hemodynamically stable  Respiratory status: spontaneous ventilation  Hydration status: euvolemic  Follow-up not needed.          Vitals Value Taken Time   /57 12/24/22 0855   Temp 36.8 °C (98.2 °F) 12/24/22 0855   Pulse 77 12/24/22 0855   Resp 16 12/24/22 0855   SpO2 96 % 12/24/22 0855         No case tracking events are documented in the log.      Pain/Nicholas Score: Pain Rating Prior to Med Admin: 2 (12/24/2022  8:27 AM)

## 2022-12-24 NOTE — PLAN OF CARE
Problem: Adult Inpatient Plan of Care  Goal: Optimal Comfort and Wellbeing  Outcome: Ongoing, Progressing  Goal: Readiness for Transition of Care  Outcome: Ongoing, Progressing     Problem: Infection  Goal: Absence of Infection Signs and Symptoms  Outcome: Ongoing, Progressing     Problem: Breastfeeding  Goal: Effective Breastfeeding  Outcome: Ongoing, Progressing     Problem: Bleeding (Postpartum Vaginal Delivery)  Goal: Hemostasis  Outcome: Ongoing, Progressing  Intervention: Monitor and Manage Postpartum Bleeding  Flowsheets (Taken 2022)   Bleed Management: uterine massage provided (delivered)     Problem: Infection (Postpartum Vaginal Delivery)  Goal: Absence of Infection Signs/Symptoms  Outcome: Ongoing, Progressing     Problem: Pain (Postpartum Vaginal Delivery)  Goal: Acceptable Pain Control  Outcome: Ongoing, Progressing  Intervention: Prevent or Manage Pain  Flowsheets (Taken 2022)  Pain Management Interventions: medication offered     Problem: Urinary Retention (Postpartum Vaginal Delivery)  Goal: Effective Urinary Elimination  Outcome: Ongoing, Progressing  Intervention: Promote Effective Urinary Elimination  Flowsheets (Taken 2022)  Urinary Elimination Promotion: toileting offered   Stable. VSS; mild range BPs noted. CSC/Epidural completed. Pt achieved ; Baby boy stable in room. Fundus firm and bleeding controlled.

## 2022-12-24 NOTE — H&P
[facility] - Labor & Delivery  Obstetrics  History & Physical    Patient Name: Catie Pendleton  MRN: [id number]  Admission Date: 2022  Primary Care Provider: Primary Doctor No    Subjective:     Principal Problem:Normal labor    History of Present Illness:  ncounter Date: 2022       History     Chief Complaint   Patient presents with    Contractions     History of Present Illness:   Catie Pendleton is a 32 y.o. female  at 39w2d with Estimated Date of Delivery: 22 based on 1st trimester sonogram who presents to Labor and Delivery accompanied by  . Expecting a Boy-[place]!    Reports regular uterine contractions since this afternoon. They are now 3-4 minutes apart and increasing in intensity and duration.  strong contractions, active fetal movement, No vaginal bleeding , No loss of fluid.     Currently drinking water and last ate an hour ago.    This pregnancy has been complicated by :  Patient Active Problem List:     Intramural leiomyoma of uterus     Migraine without aura, not intractable, without status migrainosus     Vulvar varices during pregnancy     Lactating mother     COVID-19 affecting pregnancy in first trimester     Exposure to varicella zoster virus (VZV)     Birth Center Risk Assessment: 0- Meets birth center guidelines     Pregnancy       Presentation: vertex  Estimated Fetal Weight: 7.5lbs      Birth Center Risk Assessment: 1-CNM management on L&D (Desires tub, might want epidural)  CNM management in ABC  CNM management on L&D  Consultation with OB to develop  plan of care  Collaborative CNM/OB management with delivery on L&D                     Review of patient's allergies indicates:   Allergen Reactions    Clindamycin Itching and Rash    Morphine Rash    Penicillins Hives and Rash       She was a baby and was told that she was allergic.    Sulfa (sulfonamide antibiotics) Rash           Past Medical History:   Diagnosis Date    Anal fissure 2020     Given  suppositories (anusol to start)--f/u at 6week visit. 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    Migraine              Past Surgical History:   Procedure Laterality Date    OVARIAN CYST REMOVAL Right 2014            Family History   Problem Relation Age of Onset    Colon polyps Mother           Precancerous - Were removed    Hypertension Mother      Hyperlipidemia Mother      Patel's esophagus Mother      Hypertension Father      Hyperlipidemia Father      Diabetes Father      Melanoma Father      Lung cancer Maternal Grandfather      Diabetes Paternal Grandmother      Emphysema Paternal Grandfather           Was a smoker    Skin cancer Paternal Grandfather           Not sure which kind of skin cancer    Breast cancer Paternal Aunt 67    Ovarian cancer Neg Hx        Social History           Tobacco Use    Smoking status: Never    Smokeless tobacco: Never   Substance Use Topics    Alcohol use: Not Currently    Drug use: Never      Review of Systems   Constitutional:  Negative for chills and fever.   HENT: Negative.  Negative for congestion.         Denies cough, sore throat or congestion. Two children at home are Covid positive ,  is covid positive and present in room.     Eyes:  Negative for visual disturbance.   Respiratory:  Negative for shortness of breath.    Cardiovascular:  Negative for chest pain and palpitations.   Gastrointestinal:  Negative for diarrhea, nausea and vomiting.        No contractions   Endocrine: Negative.    Genitourinary:  Negative for dysuria and genital sores.        Denies vaginal bleeding or leaking fluid.    Musculoskeletal: Negative.    Skin:  Negative for rash.   Allergic/Immunologic: Negative.    Neurological:  Negative for dizziness, syncope and headaches.   Psychiatric/Behavioral:  Negative for agitation, behavioral problems and suicidal ideas. The patient is not nervous/anxious.        Physical Exam             Initial Vitals   BP Pulse Resp Temp SpO2   -- -- -- -- --       MAP           --           BP: 142/81, FIRST 146/87 (added Pre-E labs)  Physical Exam     Constitutional: She appears well-developed and well-nourished.   HENT:   Head: Normocephalic and atraumatic.   Eyes: Conjunctivae are normal.   Neck: Neck supple.   Normal range of motion.  Cardiovascular:  Normal rate, regular rhythm and normal heart sounds.           Pulmonary/Chest: Breath sounds normal.   Abdominal: Abdomen is soft. Bowel sounds are normal.   Genitourinary:    Vagina and uterus normal.      Genitourinary Comments: SVE 5/80/-2/vertex/posterior/ soft  Intact BOW, Vertex        Musculoskeletal:         General: Normal range of motion.      Cervical back: Normal range of motion and neck supple.      Neurological: She is alert and oriented to person, place, and time. She has normal strength.   Skin: Skin is warm and dry.   Psychiatric: She has a normal mood and affect. Her behavior is normal. Judgment and thought content normal.         ED Course   Fetal non-stress test     Date/Time: 12/23/2022 8:38 PM  Performed by: Rajani Barker CNM  Authorized by: Rajani Barker CNM      Nonstress Test:     Variability:  6-25 BPM    Decelerations:  None    Accelerations:  15 bpm    Baseline:  135    Uterine Irritability: No      Contractions:  Regular    Contraction Frequency:  3  Biophysical Profile:     Nonstress Test Interpretation: reactive      Overall Impression:  Reassuring  Post-procedure:     Patient tolerance:  Patient tolerated the procedure well with no immediate complications  Labs Reviewed - No data to display        Imaging Results    None            Medications - No data to display  Medical Decision Making:   Initial Assessment:   Normal Labor  High Risk for COVID (2 children at home are POS/  likely pos and present)  Desires NCB-wants to try tub, may opt for epidural (patient ok with L&D)  Dr. Azul to  be updated--agrees with plan of care.                    Clinical Impression:      Diagnosis      1. Normal labor    2. Close exposure to COVID-19 virus       Expectant management  Covid test collected        Assessment/Plan:     32 y.o. female  at 39w2d for:    * Normal labor  Expectant management  Desires epidural    Close exposure to COVID-19 virus  Admission Covid test Negative      Elevated BP without dx of Hypertension  Pre-E labs ordered  Close surveillance      Rajani Barker CNM  Obstetrics  [facility] - Labor & Delivery

## 2022-12-24 NOTE — HOSPITAL COURSE
22:   Admission to labor (SVE 5/80/-2/ posterior/ soft/ bow intact/ vertex)  Requesting epidural--anesthesia to bedside and placed.  : 6-7/100/-2 SROM -meconium stained fluid  230: calls out c/o pressure C/C/+3  2306:  viable male, 1st degree laceration repaired, assume routine pp care. EBL 200cc    PPD2: Patient recovering well. Speaking positively of birth experience. Breastfeeding exclusively. Normal involution. Discharge teaching completed

## 2022-12-24 NOTE — ANESTHESIA PROCEDURE NOTES
CSE    Patient location during procedure: OB  Start time: 12/23/2022 9:53 PM  Timeout: 12/23/2022 9:53 PM  End time: 12/23/2022 10:00 PM    Reason for block: labor analgesia requested by patient and obstetrician    Staffing  Authorizing Provider: Flores Mckeon MD  Performing Provider: Louie Osborne MD    Preanesthetic Checklist  Completed: patient identified, IV checked, site marked, risks and benefits discussed, surgical consent, monitors and equipment checked, pre-op evaluation and timeout performed  CSE  Patient position: sitting  Prep: ChloraPrep  Patient monitoring: heart rate, continuous pulse ox and frequent blood pressure checks  Approach: midline  Spinal Needle  Needle type: Sandrita   Needle gauge: 25 G  Needle length: 5 in  Epidural Needle  Injection technique: EMELYN air  Needle type: Tuohy   Needle gauge: 17 G  Needle length: 3.5 in  Needle insertion depth: 7 cm  Location: L3-4  Needle localization: anatomical landmarks   Catheter  Catheter type: springwZ-good  Catheter size: 20 G  Catheter at skin depth: 11 cm  Test dose: lidocaine 1.5% with Epi 1-to-200,000  Test dose: 3 mL  Additional Documentation: incremental injection, negative aspiration for heme and negative test dose

## 2022-12-25 VITALS
OXYGEN SATURATION: 96 % | HEART RATE: 86 BPM | TEMPERATURE: 98 F | RESPIRATION RATE: 18 BRPM | DIASTOLIC BLOOD PRESSURE: 66 MMHG | SYSTOLIC BLOOD PRESSURE: 114 MMHG

## 2022-12-25 PROCEDURE — 25000003 PHARM REV CODE 250: Performed by: ADVANCED PRACTICE MIDWIFE

## 2022-12-25 RX ORDER — IBUPROFEN 600 MG/1
600 TABLET ORAL EVERY 6 HOURS PRN
Qty: 30 TABLET | Refills: 1 | Status: SHIPPED | OUTPATIENT
Start: 2022-12-25 | End: 2023-02-16

## 2022-12-25 RX ORDER — DOCUSATE SODIUM 100 MG/1
200 CAPSULE, LIQUID FILLED ORAL 2 TIMES DAILY PRN
Qty: 30 CAPSULE | Refills: 0 | Status: SHIPPED | OUTPATIENT
Start: 2022-12-25 | End: 2023-02-16

## 2022-12-25 RX ADMIN — IBUPROFEN 600 MG: 600 TABLET, FILM COATED ORAL at 09:12

## 2022-12-25 RX ADMIN — DOCUSATE SODIUM 200 MG: 100 CAPSULE, LIQUID FILLED ORAL at 09:12

## 2022-12-25 RX ADMIN — PRENATAL VIT W/ FE FUMARATE-FA TAB 27-0.8 MG 1 TABLET: 27-0.8 TAB at 09:12

## 2022-12-25 RX ADMIN — IBUPROFEN 600 MG: 600 TABLET, FILM COATED ORAL at 03:12

## 2022-12-25 NOTE — PLAN OF CARE
POC reviewed with pt throughout the shift; all questions answered. Pt ambulating, voiding, and passing flatus. Continues to tolerate PO well with no SS of distress at this time. Pain well controlled throughout shift by oral pain medication. Bleeding remains light and fundus is firm. Safety maintained per unit protocol. See flowsheets for additional information.

## 2022-12-25 NOTE — ASSESSMENT & PLAN NOTE
Normotensive since delivery  PIH labs wnl  Patient to monitor symptoms at home PP, parameters and warning s/sx reviewed

## 2022-12-25 NOTE — LACTATION NOTE
Infant nursing, pt independent with latching and feeding. Reports some tenderness when latch not deep but able to readjust. Denies questions/concerns at this time. Support and encouragement provided.     Lactation discharge education completed. Plan of care is for pt to follow basic breastfeeding education, frequent feeding on demand, and to monitor baby's voids and stools. Breastfeeding guide, including First Alert survey, resource list, and lactation warmline phone number reviewed. Pt to notify doctor for maternal or infant concerns, as reviewed with LC. Pt verbalizes understanding.       12/25/22 0810   Maternal Assessment   Breast Shape round;pendulous   Breast Density soft   Areola elastic   Nipples everted   Maternal Infant Feeding   Maternal Emotional State relaxed   Infant Positioning cradle   Signs of Milk Transfer audible swallow;infant jaw motion present   Pain with Feeding no   Nipple Shape After Feeding, Left   (continues nursing)   Latch Assistance no

## 2022-12-25 NOTE — PLAN OF CARE
Vitals stable, voiding, ambulating independently, pain controlled with ordered pain medication, fundus firm with light lochia, cleared for discharge home with baby.  Problem:  Fall Injury Risk  Goal: Absence of Fall, Infant Drop and Related Injury  Outcome: Met     Problem: Adult Inpatient Plan of Care  Goal: Plan of Care Review  Outcome: Met  Goal: Patient-Specific Goal (Individualized)  Outcome: Met  Goal: Absence of Hospital-Acquired Illness or Injury  Outcome: Met  Goal: Optimal Comfort and Wellbeing  Outcome: Met  Goal: Readiness for Transition of Care  Outcome: Met     Problem: Infection  Goal: Absence of Infection Signs and Symptoms  Outcome: Met     Problem: Breastfeeding  Goal: Effective Breastfeeding  Outcome: Met     Problem: Pain (Anesthesia/Analgesia, Neuraxial)  Goal: Effective Pain Control  Outcome: Met     Problem: Sensorimotor Impairment (Anesthesia/Analgesia, Neuraxial)  Goal: Baseline Motor Function  Outcome: Met     Problem: Urinary Retention (Anesthesia/Analgesia, Neuraxial)  Goal: Effective Urinary Elimination  Outcome: Met     Problem: Adjustment to Role Transition (Postpartum Vaginal Delivery)  Goal: Successful Maternal Role Transition  Outcome: Met     Problem: Bleeding (Postpartum Vaginal Delivery)  Goal: Hemostasis  Outcome: Met     Problem: Infection (Postpartum Vaginal Delivery)  Goal: Absence of Infection Signs/Symptoms  Outcome: Met     Problem: Pain (Postpartum Vaginal Delivery)  Goal: Acceptable Pain Control  Outcome: Met     Problem: Urinary Retention (Postpartum Vaginal Delivery)  Goal: Effective Urinary Elimination  Outcome: Met

## 2022-12-25 NOTE — PROGRESS NOTES
[facility], [place] - [facility] ([place])  Obstetrics  Postpartum Progress Note    Patient Name: Catie Pendleton  MRN: [id number]  Admission Date: 2022  Hospital Length of Stay: 2 days  Attending Physician: Kimberlee Azul MD  Primary Care Provider: Primary Doctor No    Subjective:     Principal Problem: (normal spontaneous vaginal delivery)    Hospital Course:  22:   Admission to labor (SVE 5/80/-2/ posterior/ soft/ bow intact/ vertex)  Requesting epidural--anesthesia to bedside and placed.  : 6-7/100/-2 SROM -meconium stained fluid  : calls out c/o pressure C/C/+3  :  viable male, 1st degree laceration repaired, assume routine pp care. EBL 200cc    PPD2: Patient recovering well. Speaking positively of birth experience. Breastfeeding exclusively. Normal involution. Discharge teaching completed      Interval History:     She is doing well this morning. She is tolerating a regular diet without nausea or vomiting. She is voiding spontaneously. She is ambulating. She has passed flatus, and has not a BM. Vaginal bleeding is mild. She denies fever or chills. Abdominal pain is mild and controlled with oral medications. She Is breastfeeding. She desires circumcision for her male baby: yes.    Objective:     Vital Signs (Most Recent):  Temp: 98.2 °F (36.8 °C) (22)  Pulse: 70 (22)  Resp: 17 (22)  BP: (!) 99/53 (22)  SpO2: 97 % (22)   Vital Signs (24h Range):  Temp:  [98.2 °F (36.8 °C)-98.5 °F (36.9 °C)] 98.2 °F (36.8 °C)  Pulse:  [70-77] 70  Resp:  [16-17] 17  SpO2:  [96 %-97 %] 97 %  BP: ()/(53-57) 99/53        There is no height or weight on file to calculate BMI.      Intake/Output Summary (Last 24 hours) at 2022 0853  Last data filed at 2022 1200  Gross per 24 hour   Intake --   Output 1000 ml   Net -1000 ml         Significant Labs:  Lab Results   Component Value Date    [facility] A POS 2022    HEPBSAG Negative  2022    STREPBCULT No Group B Streptococcus isolated 2022     Recent Labs   Lab 22   HGB 11.8*   HCT 36.7*       I have personallly reviewed all pertinent lab results from the last 24 hours.    Physical Exam:   Constitutional: She is oriented to person, place, and time. She appears well-developed and well-nourished. No distress.     Eyes: EOM are normal.     Cardiovascular:  Normal rate.             Pulmonary/Chest: Effort normal. No respiratory distress.        Abdominal: Soft. She exhibits distension. There is no abdominal tenderness.     Genitourinary: There is vaginal discharge (light lochia rubra) in the vagina. Uterus is enlarged.    Genitourinary Comments: Fundus firm, midline             Musculoskeletal: Normal range of motion and moves all extremeties.       Neurological: She is alert and oriented to person, place, and time.    Skin: Skin is warm and dry.    Psychiatric: She has a normal mood and affect. Her behavior is normal. Judgment and thought content normal.     Review of Systems    Assessment/Plan:     32 y.o. female  for:    *  (normal spontaneous vaginal delivery)  Routine postpartum care    Elevated BP without diagnosis of hypertension  Normotensive since delivery  PI labs wnl  Patient to monitor symptoms at home PP, parameters and warning s/sx reviewed    Close exposure to COVID-19 virus  Admission Covid test Negative        Disposition: As patient meets milestones, will plan to discharge today.    Joseph Sanon CNM  Obstetrics  [facility] ([place])

## 2022-12-25 NOTE — SUBJECTIVE & OBJECTIVE
Interval History:     She is doing well this morning. She is tolerating a regular diet without nausea or vomiting. She is voiding spontaneously. She is ambulating. She has passed flatus, and has not a BM. Vaginal bleeding is mild. She denies fever or chills. Abdominal pain is mild and controlled with oral medications. She Is breastfeeding. She desires circumcision for her male baby: yes.    Objective:     Vital Signs (Most Recent):  Temp: 98.2 °F (36.8 °C) (12/25/22 0031)  Pulse: 70 (12/25/22 0031)  Resp: 17 (12/25/22 0031)  BP: (!) 99/53 (12/25/22 0031)  SpO2: 97 % (12/25/22 0031)   Vital Signs (24h Range):  Temp:  [98.2 °F (36.8 °C)-98.5 °F (36.9 °C)] 98.2 °F (36.8 °C)  Pulse:  [70-77] 70  Resp:  [16-17] 17  SpO2:  [96 %-97 %] 97 %  BP: ()/(53-57) 99/53        There is no height or weight on file to calculate BMI.      Intake/Output Summary (Last 24 hours) at 12/25/2022 0853  Last data filed at 12/24/2022 1200  Gross per 24 hour   Intake --   Output 1000 ml   Net -1000 ml         Significant Labs:  Lab Results   Component Value Date    GROUPTRH A POS 12/23/2022    HEPBSAG Negative 06/21/2022    STREPBCULT No Group B Streptococcus isolated 12/05/2022     Recent Labs   Lab 12/23/22 2044   HGB 11.8*   HCT 36.7*       I have personallly reviewed all pertinent lab results from the last 24 hours.    Physical Exam:   Constitutional: She is oriented to person, place, and time. She appears well-developed and well-nourished. No distress.     Eyes: EOM are normal.     Cardiovascular:  Normal rate.             Pulmonary/Chest: Effort normal. No respiratory distress.        Abdominal: Soft. She exhibits distension. There is no abdominal tenderness.     Genitourinary: There is vaginal discharge (light lochia rubra) in the vagina. Uterus is enlarged.    Genitourinary Comments: Fundus firm, midline             Musculoskeletal: Normal range of motion and moves all extremeties.       Neurological: She is alert and oriented  to person, place, and time.    Skin: Skin is warm and dry.    Psychiatric: She has a normal mood and affect. Her behavior is normal. Judgment and thought content normal.     Review of Systems

## 2022-12-25 NOTE — DISCHARGE SUMMARY
[facility] ([place])  Obstetrics  Discharge Summary      Patient Name: Catie Pendleton  MRN: [id number]  Admission Date: 2022  Hospital Length of Stay: 2 days  Discharge Date and Time:  2022 9:03 AM  Attending Physician: Kimberlee Azul MD   Discharging Provider: Joseph Sanon CNM   Primary Care Provider: Primary Doctor No    HPI:   ncounter Date: 2022       History     Chief Complaint   Patient presents with    Contractions     History of Present Illness:   Catie Pendleton is a 32 y.o. female  at 39w2d with Estimated Date of Delivery: 22 based on 1st trimester sonogram who presents to Labor and Delivery accompanied by  . Expecting a Boy-Sanchez!    Reports regular uterine contractions since this afternoon. They are now 3-4 minutes apart and increasing in intensity and duration.  strong contractions, active fetal movement, No vaginal bleeding , No loss of fluid.     Currently drinking water and last ate an hour ago.    This pregnancy has been complicated by :  Patient Active Problem List:     Intramural leiomyoma of uterus     Migraine without aura, not intractable, without status migrainosus     Vulvar varices during pregnancy     Lactating mother     COVID-19 affecting pregnancy in first trimester     Exposure to varicella zoster virus (VZV)     Birth Center Risk Assessment: 0- Meets birth center guidelines     Pregnancy       Presentation: vertex  Estimated Fetal Weight: 7.5lbs      Birth Center Risk Assessment: 1-CNM management on L&D (Desires tub, might want epidural)  CNM management in ABC  CNM management on L&D  Consultation with OB to develop  plan of care  Collaborative CNM/OB management with delivery on L&D                     * No surgery found *     Hospital Course:   22:   Admission to labor (SVE 5/80/-2/ posterior/ soft/ bow intact/ vertex)  Requesting epidural--anesthesia to bedside and placed.  : 6-7/100/-2 SROM -meconium stained  fluid  230: calls out c/o pressure C/C/+3  :  viable male, 1st degree laceration repaired, assume routine pp care. EBL 200cc    PPD2: Patient recovering well. Speaking positively of birth experience. Breastfeeding exclusively. Normal involution. Discharge teaching completed           Final Active Diagnoses:    Diagnosis Date Noted POA    PRINCIPAL PROBLEM:   (normal spontaneous vaginal delivery) [O80] 2022 Not Applicable    Close exposure to COVID-19 virus [Z20.822] 2022 Yes    Elevated BP without diagnosis of hypertension [R03.0] 2022 Yes      Problems Resolved During this Admission:    Diagnosis Date Noted Date Resolved POA    Normal labor [O80, Z37.9] 2022 Not Applicable        Significant Diagnostic Studies: Labs: All labs within the past 24 hours have been reviewed      Feeding Method: breast    Immunizations     Date Immunization Status Dose Route/Site Given by    22 MMR Incomplete 0.5 mL Subcutaneous/     22 Tdap Incomplete 0.5 mL Intramuscular/           Delivery:    Episiotomy: None   Lacerations: 1st   Repair suture:     Repair # of packets: 1   Blood loss (ml):       Birth information:  YOB: 2022   Time of birth: 11:06 PM   Sex: male   Delivery type: Vaginal, Spontaneous   Gestational Age: 39w2d    Delivery Clinician:      Other providers:       Additional  information:  Forceps:    Vacuum:    Breech:    Observed anomalies      Living?:           APGARS  One minute Five minutes Ten minutes   Skin color:         Heart rate:         Grimace:         Muscle tone:         Breathing:         Totals: 8  9        Placenta: Delivered:       appearance    Pending Diagnostic Studies:     None          Discharged Condition: good    Disposition: Home or Self Care    Follow Up:   Follow-up Information     [facility] Follow up in 6 week(s).    Specialty: Obstetrics and Gynecology  Contact  information:  [address]  [facility] [id number] [phone]  Additional information:  [facility] 4th Floor   Please park in [address] and use [address] elevators                     Patient Instructions:      Diet Adult Regular     Pelvic Rest     Ice to affected area     Lifting restrictions     Notify your health care provider if you experience any of the following:  increased confusion or weakness     Notify your health care provider if you experience any of the following:  persistent dizziness, light-headedness, or visual disturbances     Notify your health care provider if you experience any of the following:  worsening rash     Notify your health care provider if you experience any of the following:  severe persistent headache     Notify your health care provider if you experience any of the following:  difficulty breathing or increased cough     Notify your health care provider if you experience any of the following:  redness, tenderness, or signs of infection (pain, swelling, redness, odor or green/yellow discharge around incision site)     Notify your health care provider if you experience any of the following:  severe uncontrolled pain     Notify your health care provider if you experience any of the following:  persistent nausea and vomiting or diarrhea     Notify your health care provider if you experience any of the following:  temperature >100.4     Activity as tolerated   Follow Up/Patient Instructions:   1. Reviewed postpartum recommendations and precautions ~ encouraged to call for fever, severe or increased pain in head, chest, abdomen, perineum or legs; heavy bleeding, foul smelling lochia, signs of depression/anxiety, or any other concern. Reviewed postpartum precautions r/t high blood pressure ~ encouraged to call for HA, vision changes, epigastric discomfort, or abnormal swelling.  2. Rx provided: ibuprofen & colace  3.  Contraception: considering NFP; will f/u at postpartum visit. Encouraged abstinence and pelvic rest for healing until after postpartum check-up.   4. Encouraged to continue PNV while breastfeeding or at least for 6 weeks postpartum. Continue po Fe.  5. Car seat law will be reviewed with patient at discharge per protocol  6. RTO in 4-6 weeks or sooner prn.  7. Discharge home today with written and verbal postpartum instructions and precautions.       Medications:  Current Discharge Medication List      START taking these medications    Details   docusate sodium (COLACE) 100 MG capsule Take 2 capsules (200 mg total) by mouth 2 (two) times daily as needed for Constipation.  Qty: 30 capsule, Refills: 0      ibuprofen (ADVIL,MOTRIN) 600 MG tablet Take 1 tablet (600 mg total) by mouth every 6 (six) hours as needed (cramping).  Qty: 30 tablet, Refills: 1         CONTINUE these medications which have NOT CHANGED    Details   ferrous sulfate 325 (65 FE) MG EC tablet Take 1 tablet (325 mg total) by mouth once daily.  Qty: 90 tablet, Refills: 0    Associated Diagnoses: Anemia during pregnancy      prenatal vit/iron fum/folic ac (PRENATAL 1+1 ORAL)              Joseph Sanon CNM  Obstetrics  [facility] ([place])

## 2022-12-29 ENCOUNTER — PATIENT MESSAGE (OUTPATIENT)
Dept: OBSTETRICS AND GYNECOLOGY | Facility: OTHER | Age: 32
End: 2022-12-29
Payer: COMMERCIAL

## 2022-12-29 ENCOUNTER — HOSPITAL ENCOUNTER (INPATIENT)
Facility: HOSPITAL | Age: 32
LOS: 2 days | Discharge: HOME | DRG: 776 | End: 2022-12-31
Attending: EMERGENCY MEDICINE | Admitting: OBSTETRICS & GYNECOLOGY
Payer: COMMERCIAL

## 2022-12-29 ENCOUNTER — TELEPHONE (OUTPATIENT)
Dept: OBSTETRICS AND GYNECOLOGY | Facility: HOSPITAL | Age: 32
End: 2022-12-29
Payer: COMMERCIAL

## 2022-12-29 DIAGNOSIS — R06.00 DYSPNEA: ICD-10-CM

## 2022-12-29 DIAGNOSIS — I10 HYPERTENSION: ICD-10-CM

## 2022-12-29 LAB
ALBUMIN SERPL BCP-MCNC: 3.2 G/DL (ref 3.5–5.2)
ALP SERPL-CCNC: 132 U/L (ref 55–135)
ALT SERPL W/O P-5'-P-CCNC: 46 U/L (ref 10–44)
ANION GAP SERPL CALC-SCNC: 8 MMOL/L (ref 8–16)
AST SERPL-CCNC: 31 U/L (ref 10–40)
BACTERIA #/AREA URNS HPF: NEGATIVE /HPF
BASOPHILS # BLD AUTO: 0.04 K/UL (ref 0–0.2)
BASOPHILS NFR BLD: 0.4 % (ref 0–1.9)
BILIRUB SERPL-MCNC: 0.5 MG/DL (ref 0.1–1)
BILIRUB UR QL STRIP: NEGATIVE
BNP SERPL-MCNC: 67 PG/ML (ref 0–99)
BUN SERPL-MCNC: 16 MG/DL (ref 6–20)
CALCIUM SERPL-MCNC: 8.7 MG/DL (ref 8.7–10.5)
CHLORIDE SERPL-SCNC: 105 MMOL/L (ref 95–110)
CLARITY UR: CLEAR
CO2 SERPL-SCNC: 25 MMOL/L (ref 23–29)
COLOR UR: YELLOW
CREAT SERPL-MCNC: 0.6 MG/DL (ref 0.5–1.4)
DIFFERENTIAL METHOD: ABNORMAL
EOSINOPHIL # BLD AUTO: 0.4 K/UL (ref 0–0.5)
EOSINOPHIL NFR BLD: 4.6 % (ref 0–8)
ERYTHROCYTE [DISTWIDTH] IN BLOOD BY AUTOMATED COUNT: 15.9 % (ref 11.5–14.5)
EST. GFR  (NO RACE VARIABLE): >60 ML/MIN/1.73 M^2
GLUCOSE SERPL-MCNC: 93 MG/DL (ref 70–110)
GLUCOSE UR QL STRIP: NEGATIVE
HCT VFR BLD AUTO: 40.7 % (ref 37–48.5)
HGB BLD-MCNC: 12.5 G/DL (ref 12–16)
HGB UR QL STRIP: ABNORMAL
HYALINE CASTS #/AREA URNS LPF: 1 /LPF
IMM GRANULOCYTES # BLD AUTO: 0.04 K/UL (ref 0–0.04)
IMM GRANULOCYTES NFR BLD AUTO: 0.4 % (ref 0–0.5)
KETONES UR QL STRIP: NEGATIVE
LEUKOCYTE ESTERASE UR QL STRIP: NEGATIVE
LYMPHOCYTES # BLD AUTO: 2 K/UL (ref 1–4.8)
LYMPHOCYTES NFR BLD: 21.7 % (ref 18–48)
MCH RBC QN AUTO: 25.7 PG (ref 27–31)
MCHC RBC AUTO-ENTMCNC: 30.7 G/DL (ref 32–36)
MCV RBC AUTO: 84 FL (ref 82–98)
MICROSCOPIC COMMENT: ABNORMAL
MONOCYTES # BLD AUTO: 0.6 K/UL (ref 0.3–1)
MONOCYTES NFR BLD: 7 % (ref 4–15)
NEUTROPHILS # BLD AUTO: 6 K/UL (ref 1.8–7.7)
NEUTROPHILS NFR BLD: 65.9 % (ref 38–73)
NITRITE UR QL STRIP: NEGATIVE
NRBC BLD-RTO: 0 /100 WBC
PH UR STRIP: 7 [PH] (ref 5–8)
PLATELET # BLD AUTO: 233 K/UL (ref 150–450)
PMV BLD AUTO: 11.1 FL (ref 9.2–12.9)
POTASSIUM SERPL-SCNC: 4.5 MMOL/L (ref 3.5–5.1)
PROT SERPL-MCNC: 6.9 G/DL (ref 6–8.4)
PROT UR QL STRIP: ABNORMAL
RBC # BLD AUTO: 4.86 M/UL (ref 4–5.4)
RBC #/AREA URNS HPF: 8 /HPF (ref 0–4)
SODIUM SERPL-SCNC: 138 MMOL/L (ref 136–145)
SP GR UR STRIP: 1.02 (ref 1–1.03)
SQUAMOUS #/AREA URNS HPF: 1 /HPF
TROPONIN I SERPL HS-MCNC: 8.5 PG/ML (ref 0–14.9)
URATE SERPL-MCNC: 5.4 MG/DL (ref 2.4–5.7)
URN SPEC COLLECT METH UR: ABNORMAL
UROBILINOGEN UR STRIP-ACNC: NEGATIVE EU/DL
WBC # BLD AUTO: 9.05 K/UL (ref 3.9–12.7)
WBC #/AREA URNS HPF: 1 /HPF (ref 0–5)

## 2022-12-29 PROCEDURE — 96376 TX/PRO/DX INJ SAME DRUG ADON: CPT

## 2022-12-29 PROCEDURE — 85025 COMPLETE CBC W/AUTO DIFF WBC: CPT | Performed by: EMERGENCY MEDICINE

## 2022-12-29 PROCEDURE — 81001 URINALYSIS AUTO W/SCOPE: CPT | Performed by: EMERGENCY MEDICINE

## 2022-12-29 PROCEDURE — 25000003 PHARM REV CODE 250: Performed by: STUDENT IN AN ORGANIZED HEALTH CARE EDUCATION/TRAINING PROGRAM

## 2022-12-29 PROCEDURE — 63600175 PHARM REV CODE 636 W HCPCS: Performed by: EMERGENCY MEDICINE

## 2022-12-29 PROCEDURE — 63600175 PHARM REV CODE 636 W HCPCS: Performed by: STUDENT IN AN ORGANIZED HEALTH CARE EDUCATION/TRAINING PROGRAM

## 2022-12-29 PROCEDURE — 12000002 HC ACUTE/MED SURGE SEMI-PRIVATE ROOM

## 2022-12-29 PROCEDURE — 84550 ASSAY OF BLOOD/URIC ACID: CPT | Performed by: EMERGENCY MEDICINE

## 2022-12-29 PROCEDURE — 93010 ELECTROCARDIOGRAM REPORT: CPT | Mod: ,,, | Performed by: SPECIALIST

## 2022-12-29 PROCEDURE — 96366 THER/PROPH/DIAG IV INF ADDON: CPT

## 2022-12-29 PROCEDURE — 93005 ELECTROCARDIOGRAM TRACING: CPT | Performed by: SPECIALIST

## 2022-12-29 PROCEDURE — 84484 ASSAY OF TROPONIN QUANT: CPT | Performed by: EMERGENCY MEDICINE

## 2022-12-29 PROCEDURE — 80053 COMPREHEN METABOLIC PANEL: CPT | Performed by: EMERGENCY MEDICINE

## 2022-12-29 PROCEDURE — 83880 ASSAY OF NATRIURETIC PEPTIDE: CPT | Performed by: EMERGENCY MEDICINE

## 2022-12-29 PROCEDURE — 96365 THER/PROPH/DIAG IV INF INIT: CPT

## 2022-12-29 PROCEDURE — 96375 TX/PRO/DX INJ NEW DRUG ADDON: CPT

## 2022-12-29 PROCEDURE — 36415 COLL VENOUS BLD VENIPUNCTURE: CPT | Performed by: EMERGENCY MEDICINE

## 2022-12-29 PROCEDURE — 93010 EKG 12-LEAD: ICD-10-PCS | Mod: ,,, | Performed by: SPECIALIST

## 2022-12-29 PROCEDURE — 99291 CRITICAL CARE FIRST HOUR: CPT

## 2022-12-29 RX ORDER — MAGNESIUM SULFATE HEPTAHYDRATE 40 MG/ML
2 INJECTION, SOLUTION INTRAVENOUS ONCE
Status: COMPLETED | OUTPATIENT
Start: 2022-12-29 | End: 2022-12-29

## 2022-12-29 RX ORDER — HYDRALAZINE HYDROCHLORIDE 20 MG/ML
5 INJECTION INTRAMUSCULAR; INTRAVENOUS
Status: COMPLETED | OUTPATIENT
Start: 2022-12-29 | End: 2022-12-29

## 2022-12-29 RX ORDER — MAGNESIUM SULFATE HEPTAHYDRATE 40 MG/ML
2 INJECTION, SOLUTION INTRAVENOUS ONCE
Status: COMPLETED | OUTPATIENT
Start: 2022-12-30 | End: 2022-12-30

## 2022-12-29 RX ORDER — ACETAMINOPHEN 325 MG/1
650 TABLET ORAL
Status: COMPLETED | OUTPATIENT
Start: 2022-12-29 | End: 2022-12-29

## 2022-12-29 RX ADMIN — MAGNESIUM SULFATE HEPTAHYDRATE 2 G: 40 INJECTION, SOLUTION INTRAVENOUS at 11:12

## 2022-12-29 RX ADMIN — ACETAMINOPHEN 650 MG: 325 TABLET ORAL at 11:12

## 2022-12-29 RX ADMIN — HYDRALAZINE HYDROCHLORIDE 5 MG: 20 INJECTION INTRAMUSCULAR; INTRAVENOUS at 08:12

## 2022-12-29 RX ADMIN — MAGNESIUM SULFATE HEPTAHYDRATE 2 G: 40 INJECTION, SOLUTION INTRAVENOUS at 09:12

## 2022-12-29 RX ADMIN — HYDRALAZINE HYDROCHLORIDE 5 MG: 20 INJECTION INTRAMUSCULAR; INTRAVENOUS at 10:12

## 2022-12-29 RX ADMIN — IBUPROFEN 600 MG: 200 TABLET, FILM COATED ORAL at 11:12

## 2022-12-29 NOTE — TELEPHONE ENCOUNTER
Catie calls on call CNM with reports of elevated BP and headache. She has taken it multiple times and most recent is 180/118. She has NO hx of elevated BP, headache is severe. She lives in [place], [place] and is advised to go to [facility] hospital due to severe elevation of BP. She is advised to go to ED immediately and DO NOT drive herself.   She verbalized understanding and states she will likely go to [facility].   Rajani Barker CNM, MSN  12/29/2022  5:58 PM

## 2022-12-30 LAB
ALBUMIN SERPL BCP-MCNC: 2.8 G/DL (ref 3.5–5.2)
ALP SERPL-CCNC: 112 U/L (ref 55–135)
ALT SERPL W/O P-5'-P-CCNC: 37 U/L (ref 10–44)
ANION GAP SERPL CALC-SCNC: 7 MMOL/L (ref 8–16)
AST SERPL-CCNC: 24 U/L (ref 10–40)
BASOPHILS # BLD AUTO: 0.05 K/UL (ref 0–0.2)
BASOPHILS NFR BLD: 0.7 % (ref 0–1.9)
BILIRUB SERPL-MCNC: 0.6 MG/DL (ref 0.1–1)
BUN SERPL-MCNC: 13 MG/DL (ref 6–20)
CALCIUM SERPL-MCNC: 7.9 MG/DL (ref 8.7–10.5)
CHLORIDE SERPL-SCNC: 103 MMOL/L (ref 95–110)
CO2 SERPL-SCNC: 25 MMOL/L (ref 23–29)
CREAT SERPL-MCNC: 0.5 MG/DL (ref 0.5–1.4)
DIFFERENTIAL METHOD: ABNORMAL
EOSINOPHIL # BLD AUTO: 0.4 K/UL (ref 0–0.5)
EOSINOPHIL NFR BLD: 5.4 % (ref 0–8)
ERYTHROCYTE [DISTWIDTH] IN BLOOD BY AUTOMATED COUNT: 15.9 % (ref 11.5–14.5)
EST. GFR  (NO RACE VARIABLE): >60 ML/MIN/1.73 M^2
GLUCOSE SERPL-MCNC: 95 MG/DL (ref 70–110)
HCT VFR BLD AUTO: 38.4 % (ref 37–48.5)
HGB BLD-MCNC: 12 G/DL (ref 12–16)
IMM GRANULOCYTES # BLD AUTO: 0.03 K/UL (ref 0–0.04)
IMM GRANULOCYTES NFR BLD AUTO: 0.4 % (ref 0–0.5)
LYMPHOCYTES # BLD AUTO: 1.6 K/UL (ref 1–4.8)
LYMPHOCYTES NFR BLD: 21.3 % (ref 18–48)
MAGNESIUM SERPL-MCNC: 4.5 MG/DL (ref 1.6–2.6)
MAGNESIUM SERPL-MCNC: 5.3 MG/DL (ref 1.6–2.6)
MCH RBC QN AUTO: 26 PG (ref 27–31)
MCHC RBC AUTO-ENTMCNC: 31.3 G/DL (ref 32–36)
MCV RBC AUTO: 83 FL (ref 82–98)
MONOCYTES # BLD AUTO: 0.6 K/UL (ref 0.3–1)
MONOCYTES NFR BLD: 8.2 % (ref 4–15)
NEUTROPHILS # BLD AUTO: 4.8 K/UL (ref 1.8–7.7)
NEUTROPHILS NFR BLD: 64 % (ref 38–73)
NRBC BLD-RTO: 0 /100 WBC
PLATELET # BLD AUTO: 219 K/UL (ref 150–450)
PMV BLD AUTO: 10.8 FL (ref 9.2–12.9)
POTASSIUM SERPL-SCNC: 3.6 MMOL/L (ref 3.5–5.1)
PROT SERPL-MCNC: 6 G/DL (ref 6–8.4)
RBC # BLD AUTO: 4.62 M/UL (ref 4–5.4)
SODIUM SERPL-SCNC: 135 MMOL/L (ref 136–145)
WBC # BLD AUTO: 7.56 K/UL (ref 3.9–12.7)

## 2022-12-30 PROCEDURE — 63600175 PHARM REV CODE 636 W HCPCS: Performed by: OBSTETRICS & GYNECOLOGY

## 2022-12-30 PROCEDURE — 83735 ASSAY OF MAGNESIUM: CPT | Performed by: OBSTETRICS & GYNECOLOGY

## 2022-12-30 PROCEDURE — 12000002 HC ACUTE/MED SURGE SEMI-PRIVATE ROOM

## 2022-12-30 PROCEDURE — 25000003 PHARM REV CODE 250: Performed by: SPECIALIST

## 2022-12-30 PROCEDURE — 36415 COLL VENOUS BLD VENIPUNCTURE: CPT | Performed by: OBSTETRICS & GYNECOLOGY

## 2022-12-30 PROCEDURE — 25000003 PHARM REV CODE 250: Performed by: OBSTETRICS & GYNECOLOGY

## 2022-12-30 PROCEDURE — 80053 COMPREHEN METABOLIC PANEL: CPT | Performed by: OBSTETRICS & GYNECOLOGY

## 2022-12-30 PROCEDURE — 85025 COMPLETE CBC W/AUTO DIFF WBC: CPT | Performed by: OBSTETRICS & GYNECOLOGY

## 2022-12-30 RX ORDER — DOCUSATE SODIUM 100 MG/1
100 CAPSULE, LIQUID FILLED ORAL 2 TIMES DAILY PRN
Status: DISCONTINUED | OUTPATIENT
Start: 2022-12-30 | End: 2022-12-31 | Stop reason: HOSPADM

## 2022-12-30 RX ORDER — CALCIUM GLUCONATE 98 MG/ML
1 INJECTION, SOLUTION INTRAVENOUS
Status: DISCONTINUED | OUTPATIENT
Start: 2022-12-30 | End: 2022-12-31 | Stop reason: HOSPADM

## 2022-12-30 RX ORDER — BUTALBITAL, ACETAMINOPHEN AND CAFFEINE 50; 325; 40 MG/1; MG/1; MG/1
2 TABLET ORAL EVERY 4 HOURS PRN
Status: DISCONTINUED | OUTPATIENT
Start: 2022-12-30 | End: 2022-12-31 | Stop reason: HOSPADM

## 2022-12-30 RX ORDER — HYDRALAZINE HYDROCHLORIDE 20 MG/ML
5 INJECTION INTRAMUSCULAR; INTRAVENOUS ONCE AS NEEDED
Status: COMPLETED | OUTPATIENT
Start: 2022-12-30 | End: 2022-12-30

## 2022-12-30 RX ORDER — IBUPROFEN 400 MG/1
800 TABLET ORAL EVERY 6 HOURS PRN
Status: DISCONTINUED | OUTPATIENT
Start: 2022-12-30 | End: 2022-12-31 | Stop reason: HOSPADM

## 2022-12-30 RX ORDER — HYDRALAZINE HYDROCHLORIDE 20 MG/ML
10 INJECTION INTRAMUSCULAR; INTRAVENOUS ONCE AS NEEDED
Status: DISCONTINUED | OUTPATIENT
Start: 2022-12-30 | End: 2022-12-31 | Stop reason: HOSPADM

## 2022-12-30 RX ORDER — NIFEDIPINE 30 MG/1
30 TABLET, EXTENDED RELEASE ORAL DAILY
Status: DISCONTINUED | OUTPATIENT
Start: 2022-12-31 | End: 2022-12-31 | Stop reason: HOSPADM

## 2022-12-30 RX ORDER — NIFEDIPINE 10 MG/1
10 CAPSULE ORAL ONCE AS NEEDED
Status: DISCONTINUED | OUTPATIENT
Start: 2022-12-30 | End: 2022-12-30

## 2022-12-30 RX ORDER — SODIUM CHLORIDE, SODIUM LACTATE, POTASSIUM CHLORIDE, CALCIUM CHLORIDE 600; 310; 30; 20 MG/100ML; MG/100ML; MG/100ML; MG/100ML
INJECTION, SOLUTION INTRAVENOUS CONTINUOUS
Status: DISCONTINUED | OUTPATIENT
Start: 2022-12-30 | End: 2022-12-31 | Stop reason: HOSPADM

## 2022-12-30 RX ORDER — LABETALOL HYDROCHLORIDE 5 MG/ML
80 INJECTION, SOLUTION INTRAVENOUS ONCE AS NEEDED
Status: DISCONTINUED | OUTPATIENT
Start: 2022-12-30 | End: 2022-12-31 | Stop reason: HOSPADM

## 2022-12-30 RX ORDER — LABETALOL HYDROCHLORIDE 5 MG/ML
20 INJECTION, SOLUTION INTRAVENOUS ONCE AS NEEDED
Status: COMPLETED | OUTPATIENT
Start: 2022-12-30 | End: 2022-12-30

## 2022-12-30 RX ORDER — LABETALOL HYDROCHLORIDE 5 MG/ML
40 INJECTION, SOLUTION INTRAVENOUS ONCE AS NEEDED
Status: DISCONTINUED | OUTPATIENT
Start: 2022-12-30 | End: 2022-12-31 | Stop reason: HOSPADM

## 2022-12-30 RX ORDER — MAGNESIUM SULFATE HEPTAHYDRATE 40 MG/ML
1 INJECTION, SOLUTION INTRAVENOUS CONTINUOUS
Status: DISCONTINUED | OUTPATIENT
Start: 2022-12-30 | End: 2022-12-30

## 2022-12-30 RX ORDER — NIFEDIPINE 10 MG/1
20 CAPSULE ORAL ONCE AS NEEDED
Status: DISCONTINUED | OUTPATIENT
Start: 2022-12-30 | End: 2022-12-30

## 2022-12-30 RX ORDER — ACETAMINOPHEN 500 MG
1000 TABLET ORAL EVERY 6 HOURS PRN
Status: DISCONTINUED | OUTPATIENT
Start: 2022-12-30 | End: 2022-12-31 | Stop reason: HOSPADM

## 2022-12-30 RX ORDER — LABETALOL HYDROCHLORIDE 5 MG/ML
40 INJECTION, SOLUTION INTRAVENOUS ONCE AS NEEDED
Status: DISCONTINUED | OUTPATIENT
Start: 2022-12-30 | End: 2022-12-30

## 2022-12-30 RX ADMIN — BUTALBITAL, ACETAMINOPHEN, AND CAFFEINE 1 TABLET: 50; 325; 40 TABLET, COATED ORAL at 11:12

## 2022-12-30 RX ADMIN — SODIUM CHLORIDE, SODIUM LACTATE, POTASSIUM CHLORIDE, AND CALCIUM CHLORIDE: .6; .31; .03; .02 INJECTION, SOLUTION INTRAVENOUS at 01:12

## 2022-12-30 RX ADMIN — IBUPROFEN 800 MG: 400 TABLET, FILM COATED ORAL at 04:12

## 2022-12-30 RX ADMIN — HYDRALAZINE HYDROCHLORIDE 5 MG: 20 INJECTION INTRAMUSCULAR; INTRAVENOUS at 11:12

## 2022-12-30 RX ADMIN — MAGNESIUM SULFATE IN WATER 2 G/HR: 40 INJECTION, SOLUTION INTRAVENOUS at 01:12

## 2022-12-30 RX ADMIN — IBUPROFEN 800 MG: 400 TABLET, FILM COATED ORAL at 07:12

## 2022-12-30 RX ADMIN — LABETALOL HYDROCHLORIDE 20 MG: 5 INJECTION, SOLUTION INTRAVENOUS at 06:12

## 2022-12-30 RX ADMIN — ACETAMINOPHEN 1000 MG: 500 TABLET ORAL at 02:12

## 2022-12-30 NOTE — H&P
[facility]  Obstetrics  History & Physical    Patient Name: Catie Barboza  MRN: [id number]  Admission Date: 2022  Primary Care Provider: Primary Doctor No    Subjective:     Principal Problem:<principal problem not specified>    History of Present Illness:  32-year-old  4 para 3 with a recent vaginal delivery at [facility] which was uneventful.  Patient presents to the emergency room with elevated blood pressure after having a headache and checking her blood pressure at home and noticed it to be in the severe range.  First blood pressure in the ER is 180s over 100s.  Patient admitted for magnesium therapy for postpartum preeclampsia based on her blood pressure and her headache.  Today patient feels a little bit better still has occasional posterior headache denies anything in the front and denies it being postural post epidural.  Since being on magnesium here on labor and delivery her pressures have been excellent in the normal range not requiring any additional medication.    Obstetric HPI:      OB History    Para Term  AB Living   4 3 3 0 1 3   SAB IAB Ectopic Multiple Live Births   1 0 0 0 3      # Outcome Date GA Lbr [place]/2nd Weight Sex Delivery Anes PTL Lv   4 Term 22 39w2d 11:00 / 00:06 3.36 kg (7 lb 6.5 oz) M Vag-Spont EPI N MATHEUS      Name: NIURKA BARBOZA      Apgar1: 8  Apgar5: 9   3 Term 10/17/20 41w1d  3.884 kg (8 lb 9 oz) F Vag-Spont EPI N MATHEUS      Name: CELIO BARBOZA      Apgar1: 7  Apgar5: 9   2 SAB 2019           1 Term 17 41w0d  3.969 kg (8 lb 12 oz) F Vag-Spont   MATHEUS     Past Medical History:   Diagnosis Date    Anal fissure 2020    Given suppositories (anusol to start)--f/u at 6week visit. 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    Hypertension     Migraine      Past Surgical History:   Procedure Laterality Date    OVARIAN CYST REMOVAL Right         PTA Medications   Medication Sig    docusate sodium (COLACE) 100 MG capsule Take 2 capsules (200 mg total) by mouth 2 (two) times daily as needed for Constipation.    ibuprofen (ADVIL,MOTRIN) 600 MG tablet Take 1 tablet (600 mg total) by mouth every 6 (six) hours as needed (cramping).    prenatal vit/iron fum/folic ac (PRENATAL 1+1 ORAL) Take 1 tablet by mouth once daily.       Review of patient's allergies indicates:   Allergen Reactions    Clindamycin Itching and Rash    Morphine Rash    Penicillins Hives and Rash     She was a baby and was told that she was allergic.    Sulfa (sulfonamide antibiotics) Rash        Family History       Problem Relation (Age of Onset)    Patel's esophagus Mother    Breast cancer Paternal Aunt (67)    Colon polyps Mother    Diabetes Father, Paternal Grandmother    Emphysema Paternal Grandfather    Hyperlipidemia Mother, Father    Hypertension Mother, Father    Lung cancer Maternal Grandfather    Melanoma Father    Skin cancer Paternal Grandfather          Tobacco Use    Smoking status: Never    Smokeless tobacco: Never   Substance and Sexual Activity    Alcohol use: Not Currently    Drug use: Never    Sexual activity: Yes     Partners: Male     Birth control/protection: None     Comment: Duy     Review of Systems   Objective:     Vital Signs (Most Recent):  Temp: 97.8 °F (36.6 °C) (12/30/22 0748)  Pulse: 79 (12/30/22 1107)  Resp: 16 (12/30/22 1130)  BP: 113/70 (12/30/22 1130)  SpO2: (!) 72 % (12/30/22 0759)   Vital Signs (24h Range):  Temp:  [97.8 °F (36.6 °C)-98.6 °F (37 °C)] 97.8 °F (36.6 °C)  Pulse:  [] 79  Resp:  [16-18] 16  SpO2:  [72 %-99 %] 72 %  BP: (101-189)/() 113/70     Weight: 77.1 kg (170 lb)  Body mass index is 31.09 kg/m².  Physical Exam  General in no acute distress comfortable  Extremities no edema normal DTRs  Lungs clear per nurse      Significant Labs:  Lab Results   Component Value Date    GROUPTRH A POS 12/23/2022    HEPBSAG Negative  06/21/2022    STREPBCULT No Group B Streptococcus isolated 12/05/2022       I have personallly reviewed all pertinent lab results from the last 24 hours.  Recent Lab Results         12/30/22  0425   12/29/22 2159 12/29/22 1945 12/29/22 1944        Albumin 2.8       3.2       Alkaline Phosphatase 112       132       ALT 37       46       Anion Gap 7       8       Appearance, UA   Clear           AST 24       31       Bacteria, UA   Negative           Baso # 0.05       0.04       Basophil % 0.7       0.4       Bilirubin (UA)   Negative           BILIRUBIN TOTAL 0.6  Comment: For infants and newborns, interpretation of results should be based  on gestational age, weight and in agreement with clinical  observations.    Premature Infant recommended reference ranges:  Up to 24 hours.............<8.0 mg/dL  Up to 48 hours............<12.0 mg/dL  3-5 days..................<15.0 mg/dL  6-29 days.................<15.0 mg/dL         0.5  Comment: For infants and newborns, interpretation of results should be based  on gestational age, weight and in agreement with clinical  observations.    Premature Infant recommended reference ranges:  Up to 24 hours.............<8.0 mg/dL  Up to 48 hours............<12.0 mg/dL  3-5 days..................<15.0 mg/dL  6-29 days.................<15.0 mg/dL         BNP     67  Comment: Values of less than 100 pg/ml are consistent with non-CHF populations.         BUN 13       16       Calcium 7.9       8.7       Chloride 103       105       CO2 25       25       Color, UA   Yellow           Creatinine 0.5       0.6       Differential Method Automated       Automated       eGFR >60.0       >60.0       Eos # 0.4       0.4       Eosinophil % 5.4       4.6       Glucose 95       93       Glucose, UA   Negative           Gran # (ANC) 4.8       6.0       Gran % 64.0       65.9       Hematocrit 38.4       40.7       Hemoglobin 12.0       12.5       Hyaline Casts, UA   1           Immature Grans  (Abs) 0.03  Comment: Mild elevation in immature granulocytes is non specific and   can be seen in a variety of conditions including stress response,   acute inflammation, trauma and pregnancy. Correlation with other   laboratory and clinical findings is essential.         0.04  Comment: Mild elevation in immature granulocytes is non specific and   can be seen in a variety of conditions including stress response,   acute inflammation, trauma and pregnancy. Correlation with other   laboratory and clinical findings is essential.         Immature Granulocytes 0.4       0.4       Ketones, UA   Negative           Leukocytes, UA   Negative           Lymph # 1.6       2.0       Lymph % 21.3       21.7       Magnesium 4.5             MCH 26.0       25.7       MCHC 31.3       30.7       MCV 83       84       Microscopic Comment   SEE COMMENT  Comment: Other formed elements not mentioned in the report are not   present in the microscopic examination.              Mono # 0.6       0.6       Mono % 8.2       7.0       MPV 10.8       11.1       NITRITE UA   Negative           nRBC 0       0       Occult Blood UA   1+           pH, UA   7.0           Platelets 219       233       Potassium 3.6       4.5       PROTEIN TOTAL 6.0       6.9       Protein, UA   Trace  Comment: Recommend a 24 hour urine protein or a urine   protein/creatinine ratio if globulin induced proteinuria is  clinically suspected.             RBC 4.62       4.86       RBC, UA   8           RDW 15.9       15.9       Sodium 135       138       Specific Gravity, UA   1.020           Specimen UA   Urine, Clean Catch           Squam Epithel, UA   1           Troponin I High Sensitivity     8.5  Comment: Troponin results differ between methods. Do not use   results between Troponin methods interchangeably.    Alkaline Phospatase levels above 400 U/L may   cause false positive results.    Access hsTnI should not be used for patients taking   Asfotase leonid  (Strensiq).           Uric Acid     5.4         UROBILINOGEN UA   Negative           WBC, UA   1           WBC 7.56       9.05               Assessment/Plan:     32 y.o. female  at 39w2d for:    There are no hospital problems to display for this patient.  Postpartum preeclampsia  Continue magnesium will slowly wean it down until tonight and see if pressures remained stable.    Robby Bedolla MD  Obstetrics  [facility]

## 2022-12-30 NOTE — LACTATION NOTE
12/30/22 1145   Maternal Assessment   Breast Size Issue none   Breast Shape round   Breast Density full   Areola elastic   Nipples everted   Maternal Infant Feeding   Maternal Emotional State independent;relaxed   Infant Positioning cradle   Signs of Milk Transfer audible swallow   Pain with Feeding no   Latch Assistance no   Equipment Type   Breast Pump Type manual   Breast Pump Flange Type hard   Breast Pump Flange Size 24 mm   Breast Pumping   Breast Pumping manual breast pump utilized     Pt readmitted for htn & is currently on magnesium IV. Pt is concerned that the mag is making baby sleepy & is not emptying her breast very well.  Pt is currently breastfeeding now. Good nutritive sucking & swallowing noted. Her breast appear to be full not engorged. Encouraged to keep putting baby to the breast. Manual breast pump given to pt to use if she becomes too uncomfortable after breastfeeding. Discussed the risks of over using the pump & putting herself in over supply. Labels & breast milk containers given to pt. Assistance offered prn. Pt verbalized understanding

## 2022-12-30 NOTE — FIRST PROVIDER EVALUATION
"Medical screening examination initiated.  I have conducted a focused provider triage encounter, findings are as follows:    Brief history of present illness:  33 yo , no known history of HTN or preeclampsia, presenting for intermittent HA for 6 days, today not relieved with OTC medications. Checked BP at home - 181/114. No N/V, visual changes, or fever, but she does report occasional palpitations with dyspnea and extremity swelling.     Delivered at [facility], midwife.    Vitals:    22 1919   BP: (!) 189/117   BP Location: Left arm   Patient Position: Sitting   Pulse: 70   Resp: 16   Temp: 98.6 °F (37 °C)   TempSrc: Oral   SpO2: 98%   Weight: 77.1 kg (170 lb)   Height: 5' 2" (1.575 m)       Pertinent physical exam:  NAD, face symmetric, CTAB, no appreciable extremity edema, HR 60s, patella reflexes 2+ bilaterally.    Brief workup plan:  Labs, UA, CXR, Hydralazine IV, Mag 2g IV, likely admit.    Preliminary workup initiated; this workup will be continued and followed by the physician or advanced practice provider that is assigned to the patient when roomed.    Nicole Matute MD 8:01 PM      "

## 2022-12-30 NOTE — ED PROVIDER NOTES
Encounter Date: 2022       History     Chief Complaint   Patient presents with    Headache    Hypertension     6 days postpartum, vaginal birth no complications, no history of HTN     HPI    32-year-old  who is currently 6 days postpartum after a vaginal birth without complication presenting with headache.  Patient gave birth via midwife at [facility]. Patient states that she began having a severe posterior headache today, checked her blood pressure at home and noticed it was 180/110.  Denies history of hypertension.  Her headache persisted despite taking Tylenol and ibuprofen while at home.  She also reports some subjective lower extremity and hand swelling.  No other symptoms.    Review of patient's allergies indicates:   Allergen Reactions    Clindamycin Itching and Rash    Morphine Rash    Penicillins Hives and Rash     She was a baby and was told that she was allergic.    Sulfa (sulfonamide antibiotics) Rash     Past Medical History:   Diagnosis Date    Anal fissure 2020    Given suppositories (anusol to start)--f/u at 6week visit. 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    Migraine      Past Surgical History:   Procedure Laterality Date    OVARIAN CYST REMOVAL Right      Family History   Problem Relation Age of Onset    Colon polyps Mother         Precancerous - Were removed    Hypertension Mother     Hyperlipidemia Mother     Patel's esophagus Mother     Hypertension Father     Hyperlipidemia Father     Diabetes Father     Melanoma Father     Lung cancer Maternal Grandfather     Diabetes Paternal Grandmother     Emphysema Paternal Grandfather         Was a smoker    Skin cancer Paternal Grandfather         Not sure which kind of skin cancer    Breast cancer Paternal Aunt 67    Ovarian cancer Neg Hx      Social History     Tobacco Use    Smoking status: Never    Smokeless tobacco: Never   Substance Use  Topics    Alcohol use: Not Currently    Drug use: Never     Review of Systems   Constitutional:  Negative for chills and fever.   HENT:  Negative for sore throat.    Respiratory:  Negative for shortness of breath.    Cardiovascular:  Negative for chest pain.   Gastrointestinal:  Negative for nausea.   Genitourinary:  Positive for vaginal bleeding. Negative for dysuria.   Musculoskeletal:  Negative for back pain.   Skin:  Negative for rash.   Neurological:  Positive for headaches. Negative for weakness.   Hematological:  Does not bruise/bleed easily.     Physical Exam     Initial Vitals [12/29/22 1919]   BP Pulse Resp Temp SpO2   (!) 189/117 70 16 98.6 °F (37 °C) 98 %      MAP       --         Physical Exam    Nursing note and vitals reviewed.  Constitutional: She appears well-developed and well-nourished.   HENT:   Head: Normocephalic and atraumatic.   Mouth/Throat: Oropharynx is clear and moist.   Eyes: Conjunctivae and EOM are normal. Pupils are equal, round, and reactive to light.   Neck:   Normal range of motion.  Cardiovascular:  Normal rate, regular rhythm and normal heart sounds.           Pulmonary/Chest: Breath sounds normal. She has no wheezes. She has no rhonchi. She has no rales.   Abdominal: Abdomen is soft. There is no abdominal tenderness.   Musculoskeletal:         General: Normal range of motion.      Cervical back: Normal range of motion.      Comments: No pitting edema     Neurological: She is alert and oriented to person, place, and time. She has normal strength and normal reflexes. She is not disoriented. No cranial nerve deficit or sensory deficit. GCS eye subscore is 4. GCS verbal subscore is 5. GCS motor subscore is 6.   Skin: Skin is warm and dry.       ED Course   Critical Care    Date/Time: 12/30/2022 7:22 AM  Performed by: Arely Pena MD  Authorized by: Arely Pena MD   Direct patient critical care time: 15 minutes  Additional history critical care time: 5  minutes  Ordering / reviewing critical care time: 5 minutes  Documentation critical care time: 5 minutes  Consulting other physicians critical care time: 5 minutes  Total critical care time (exclusive of procedural time) : 35 minutes  Critical care time was exclusive of separately billable procedures and treating other patients and teaching time.  Critical care was necessary to treat or prevent imminent or life-threatening deterioration of the following conditions: postpartum preeclampsia.  Critical care was time spent personally by me on the following activities: development of treatment plan with patient or surrogate, discussions with consultants, examination of patient, obtaining history from patient or surrogate, ordering and performing treatments and interventions, ordering and review of laboratory studies, ordering and review of radiographic studies, pulse oximetry, re-evaluation of patient's condition and review of old charts.      Labs Reviewed   CBC W/ AUTO DIFFERENTIAL - Abnormal; Notable for the following components:       Result Value    MCH 25.7 (*)     MCHC 30.7 (*)     RDW 15.9 (*)     All other components within normal limits   COMPREHENSIVE METABOLIC PANEL - Abnormal; Notable for the following components:    Albumin 3.2 (*)     ALT 46 (*)     All other components within normal limits   URINALYSIS, REFLEX TO URINE CULTURE - Abnormal; Notable for the following components:    Protein, UA Trace (*)     Occult Blood UA 1+ (*)     All other components within normal limits    Narrative:     Specimen Source->Urine   URINALYSIS MICROSCOPIC - Abnormal; Notable for the following components:    RBC, UA 8 (*)     All other components within normal limits    Narrative:     Specimen Source->Urine   URIC ACID   B-TYPE NATRIURETIC PEPTIDE   TROPONIN I HIGH SENSITIVITY   URIC ACID   TROPONIN I HIGH SENSITIVITY   B-TYPE NATRIURETIC PEPTIDE        ECG Results              EKG 12-lead (In process)  Result time 12/30/22  05:23:40      In process by Interface, Lab In [facility] (12/30/22 05:23:40)                   Narrative:    Test Reason : I10,    Vent. Rate : 059 BPM     Atrial Rate : 059 BPM     P-R Int : 134 ms          QRS Dur : 084 ms      QT Int : 436 ms       P-R-T Axes : 045 -28 047 degrees     QTc Int : 431 ms    Sinus bradycardia  Otherwise normal ECG  No previous ECGs available    Referred By: [id number]   SELF           Confirmed By:                                   Imaging Results              X-Ray Chest AP Portable (In process)                      Medications   hydrALAZINE injection 5 mg (has no administration in time range)   hydrALAZINE injection 10 mg (has no administration in time range)   labetaloL injection 20 mg (has no administration in time range)   labetaloL injection 40 mg (has no administration in time range)   labetaloL injection 80 mg (has no administration in time range)   NIFEdipine capsule 10 mg (has no administration in time range)   NIFEdipine capsule 20 mg (has no administration in time range)   labetaloL injection 40 mg (has no administration in time range)   magnesium sulfate in water 40 gram/1,000 mL (4 %) infusion (2 g/hr Intravenous New Bag 12/30/22 0145)   calcium gluconate 100 mg/mL (10%) injection 1 g (has no administration in time range)   lactated ringers infusion ( Intravenous New Bag 12/30/22 0145)   ibuprofen tablet 800 mg (has no administration in time range)   acetaminophen tablet 1,000 mg (has no administration in time range)   docusate sodium capsule 100 mg (has no administration in time range)   magnesium sulfate 2g in water 50mL IVPB (premix) (0 g Intravenous Stopped 12/29/22 2321)   hydrALAZINE injection 5 mg (5 mg Intravenous Given 12/29/22 2003)   hydrALAZINE injection 5 mg (5 mg Intravenous Given 12/29/22 2240)   acetaminophen tablet 650 mg (650 mg Oral Given 12/29/22 2307)   ibuprofen tablet 600 mg (600 mg Oral Given 12/29/22 2308)   magnesium sulfate 2g in water 50mL IVPB  (premix) (2 g Intravenous New Bag 22 7615)     Medical Decision Making:   Initial Assessment:   32-year-old female currently 6 days postpartum after normal vaginal delivery.  No history of preeclampsia.  ED Management:  Initial blood pressure 189/117, patient with severe posterior headache.  She was given 5 mg of hydralazine as well as 2 g of magnesium while in triage.  Lab significant for normal platelets at 233 with trace protein present in the urine and a slight bump in ALT at 46.  On physical exam she has normal reflexes.  Patient reports subjective swelling to the legs and hands, she is no pitting edema on exam.  Patient required an additional 5 mg of hydralazine as her blood pressure increased again to the 160 systolic with return of her headache.  Case discussed with Dr. Santiago who is on-call for OBGYN, she is recommended admission an additional 2 g of Mag for total of 4 g.  Patient to be admitted for further management of postpartum preeclampsia.    Dalia Min MD          Attending Attestation:   Physician Attestation Statement for Resident:  As the supervising MD   Physician Attestation Statement: I have personally seen and examined this patient.   I agree with the above history.  -:   As the supervising MD I agree with the above PE.     As the supervising MD I agree with the above treatment, course, plan, and disposition.   -:       32-year-old  who is 6 days postpartum from uncomplicated vaginal delivery presents emergency department with headache, peripheral edema and elevated blood pressures.  The patient has been taking max doses of Tylenol and ibuprofen at home.  Normal neurological exam with normal reflexes here in the emergency department.  Benign abdominal exam.  Labs with mildly elevated ALT and trace proteinuria.  Remainder of cardiac workup unremarkable.  Chest x-ray does not reveal acute process.  EKG is nonischemic.  Patient given hydralazine and magnesium with improvement in  symptoms and BP.  Discussed the case with OBGYN, Dr. Santiago, who agrees with the plan for admission for postpartum preeclampsia. The patient is aware of and agrees with the plan of care. Detailed return precautions discussed.    Arely Pena MD  Emergency Medicine  12/30/2022 7:23 AM           I have reviewed and agree with the residents interpretation of the following: lab data, x-rays, EKG and rhythm strips.  I have reviewed the following: old records at this facility.              ED Course as of 12/30/22 0723   u Dec 29, 2022   2207 ALT(!): 46 [EM]   2207 Platelets: 233 [EM]   2253 Protein, UA(!): Trace [EM]      ED Course User Index  [EM] Dalia Min MD                 Clinical Impression:   Final diagnoses:  [R06.00] Dyspnea  [I10] Hypertension  [O14.95] Preeclampsia in postpartum period (Primary)        ED Disposition Condition    Send to L&D Stable                Dalia Min MD  Resident  12/30/22 0030       Arely Pena MD  12/30/22 0786

## 2022-12-30 NOTE — NURSING TRANSFER
Nursing Transfer Note      12/30/2022     Reason patient is being transferred: postpartum re-admission d/t preeclampsia    Transfer From: ED To: 2301    Transfer via wheelchair    Transfer with SL IV    Transported by ER tech    Medicines sent: none    Any special needs or follow-up needed: fall risk    Chart send with patient: Yes    Notified: spouse at bedside    Patient reassessed at: 0139 12/30/2022      Upon arrival to floor: patient oriented to room, call bell in reach, and bed in lowest position

## 2022-12-30 NOTE — PLAN OF CARE
Critical access hospital  Initial Discharge Assessment       Primary Care Provider: Primary Doctor No    Admission Diagnosis: Headache in pregnancy, postpartum [O90.89, R51.9]  Preeclampsia in postpartum period [O14.95]    Admission Date: 12/29/2022  Expected Discharge Date:     Discharge Barriers Identified: None    Payor: BLUE CROSS BLUE SHIELD / Plan: BCBS ALL OUT OF STATE / Product Type: PPO /     Extended Emergency Contact Information  Primary Emergency Contact: Duy Pendleton  Mobile Phone: [phone]  Relation: Spouse  Preferred language: English   needed? No    Discharge Plan A: Home, Home with family  Discharge Plan B: Home, Home with family      [id number] - [place], [place] - [address] AT [facility]  [address]  [place] [address]  Phone: [phone] Fax: [phone]    [facility]  [address]  [facility] [address]  Phone: [phone] Fax: [phone]      Initial Assessment (most recent)       Adult Discharge Assessment - 12/30/22 1525          Discharge Assessment    Assessment Type Discharge Planning Assessment     Confirmed/corrected address, phone number and insurance Yes     Source of Information patient;health record     Communicated EMMA with patient/caregiver Date not available/Unable to determine     Reason For Admission Duy Pendleton (Spouse)   [phone]     People in Home spouse     Do you expect to return to your current living situation? No     Do you have help at home or someone to help you manage your care at home? Yes     Who are your caregiver(s) and their phone number(s)? Duy Pendleton (Spouse)   [phone]     Prior to hospitilization cognitive status: Alert/Oriented     Current cognitive status: Alert/Oriented     Readmission within 30 days? No     Patient currently being followed by outpatient case management? No     Do you currently have service(s) that help you manage your care at home? No      Do you have any problems affording any of your prescribed medications? No     Who is going to help you get home at discharge? Duy Pendleton (Spouse)   [phone]     How do you get to doctors appointments? car, drives self     Are you on dialysis? No     Do you take coumadin? No     Discharge Plan A Home;Home with family     Discharge Plan B Home;Home with family     DME Needed Upon Discharge  none     Discharge Barriers Identified None

## 2022-12-30 NOTE — NURSING
0850--called 2100 for Peg with lactation. They stated she is running late. Asked if they could send her to see patient as soon as she arrives and they stated, yes.   1100--Dr Walter on unit, reviewed chart. Orders to decrease mag from 2gr/hr to 1gr/hr.He will see her after rounds and a delivery.   1108--mag sulfate decreased to 1g/hr. Pt informed. Stated that she is getting anxious about wanting to pump since the baby is sleeping more. Informed her that I will call lactation again to come see and see what type of manual pump they have.   1115--spoke with MARLYN Howell RN (lactation) and she will come see her as soon as she is done assisting another pt with breastfeeding.   1120--Dr Walter at bedside to see pt.   1145--MARLYN Howell RN at bedside to see pt and set up with pumping supplies that the pt has requested.  1230--feels better since mag sulfate lowered and was able to pump. Breast milk labeled and placed into milk fridge in nursery.  1430--resting with c/o headache posteriolly. Will give tylenol.   1656--pt still with headache. Motrin 800mg given. Pt inquiring about plan of care. Informed will discuss with Dr Walter and get back to her.   1710--pt request IV heplocked temporarily while she changes into her clothes.  1735--spoke with Dr Walter about poc for this evening.   1745--Informed pt will leave mag off, vs every 4 hours and fioricet for headache if needed vs tylenol. Pt concerned about b/p that had just taken of 160/86. Informed will repeat b/p in 10 min then if elevated will report back in with Dr walter. Verbalized understanding.  1800--informed Dr Walter b/p 160/86 and 170/96. Orders received to keep mag off and followed IV labetolol protocol.   1815--pt informed will give IV Labetolol first and see if b/p lower. Verbalized understanding.

## 2022-12-31 ENCOUNTER — TELEPHONE (OUTPATIENT)
Dept: OBSTETRICS AND GYNECOLOGY | Facility: HOSPITAL | Age: 32
End: 2022-12-31
Payer: COMMERCIAL

## 2022-12-31 ENCOUNTER — HOSPITAL ENCOUNTER (EMERGENCY)
Facility: OTHER | Age: 32
Discharge: HOME | End: 2022-12-31
Attending: OBSTETRICS & GYNECOLOGY
Payer: COMMERCIAL

## 2022-12-31 VITALS
TEMPERATURE: 97 F | OXYGEN SATURATION: 97 % | DIASTOLIC BLOOD PRESSURE: 76 MMHG | OXYGEN SATURATION: 97 % | SYSTOLIC BLOOD PRESSURE: 128 MMHG | SYSTOLIC BLOOD PRESSURE: 134 MMHG | DIASTOLIC BLOOD PRESSURE: 71 MMHG | HEIGHT: 62 IN | RESPIRATION RATE: 16 BRPM | BODY MASS INDEX: 31.28 KG/M2 | HEART RATE: 78 BPM | RESPIRATION RATE: 17 BRPM | HEART RATE: 88 BPM | TEMPERATURE: 99 F | WEIGHT: 170 LBS

## 2022-12-31 DIAGNOSIS — I10 HYPERTENSION, UNSPECIFIED TYPE: Primary | ICD-10-CM

## 2022-12-31 DIAGNOSIS — G43.819 OTHER MIGRAINE WITHOUT STATUS MIGRAINOSUS, INTRACTABLE: ICD-10-CM

## 2022-12-31 LAB
ALBUMIN SERPL BCP-MCNC: 3 G/DL (ref 3.5–5.2)
ALP SERPL-CCNC: 116 U/L (ref 55–135)
ALT SERPL W/O P-5'-P-CCNC: 28 U/L (ref 10–44)
ANION GAP SERPL CALC-SCNC: 9 MMOL/L (ref 8–16)
AST SERPL-CCNC: 17 U/L (ref 10–40)
BASOPHILS # BLD AUTO: 0.04 K/UL (ref 0–0.2)
BASOPHILS NFR BLD: 0.5 % (ref 0–1.9)
BILIRUB SERPL-MCNC: 0.4 MG/DL (ref 0.1–1)
BUN SERPL-MCNC: 14 MG/DL (ref 6–20)
CALCIUM SERPL-MCNC: 8.7 MG/DL (ref 8.7–10.5)
CHLORIDE SERPL-SCNC: 111 MMOL/L (ref 95–110)
CO2 SERPL-SCNC: 20 MMOL/L (ref 23–29)
CREAT SERPL-MCNC: 0.6 MG/DL (ref 0.5–1.4)
DIFFERENTIAL METHOD: ABNORMAL
EOSINOPHIL # BLD AUTO: 0.5 K/UL (ref 0–0.5)
EOSINOPHIL NFR BLD: 5.2 % (ref 0–8)
ERYTHROCYTE [DISTWIDTH] IN BLOOD BY AUTOMATED COUNT: 16.1 % (ref 11.5–14.5)
EST. GFR  (NO RACE VARIABLE): >60 ML/MIN/1.73 M^2
GLUCOSE SERPL-MCNC: 84 MG/DL (ref 70–110)
HCT VFR BLD AUTO: 40.2 % (ref 37–48.5)
HGB BLD-MCNC: 12.8 G/DL (ref 12–16)
IMM GRANULOCYTES # BLD AUTO: 0.04 K/UL (ref 0–0.04)
IMM GRANULOCYTES NFR BLD AUTO: 0.5 % (ref 0–0.5)
LYMPHOCYTES # BLD AUTO: 2.2 K/UL (ref 1–4.8)
LYMPHOCYTES NFR BLD: 25 % (ref 18–48)
MCH RBC QN AUTO: 26.4 PG (ref 27–31)
MCHC RBC AUTO-ENTMCNC: 31.8 G/DL (ref 32–36)
MCV RBC AUTO: 83 FL (ref 82–98)
MONOCYTES # BLD AUTO: 0.7 K/UL (ref 0.3–1)
MONOCYTES NFR BLD: 8 % (ref 4–15)
NEUTROPHILS # BLD AUTO: 5.3 K/UL (ref 1.8–7.7)
NEUTROPHILS NFR BLD: 60.8 % (ref 38–73)
NRBC BLD-RTO: 0 /100 WBC
PLATELET # BLD AUTO: 235 K/UL (ref 150–450)
PMV BLD AUTO: 10.7 FL (ref 9.2–12.9)
POTASSIUM SERPL-SCNC: 4.2 MMOL/L (ref 3.5–5.1)
PROT SERPL-MCNC: 6.4 G/DL (ref 6–8.4)
RBC # BLD AUTO: 4.84 M/UL (ref 4–5.4)
SARS-COV-2 RDRP RESP QL NAA+PROBE: NEGATIVE
SODIUM SERPL-SCNC: 140 MMOL/L (ref 136–145)
WBC # BLD AUTO: 8.64 K/UL (ref 3.9–12.7)

## 2022-12-31 PROCEDURE — 99284 EMERGENCY DEPT VISIT MOD MDM: CPT | Mod: ,,, | Performed by: OBSTETRICS & GYNECOLOGY

## 2022-12-31 PROCEDURE — 25000003 PHARM REV CODE 250: Performed by: STUDENT IN AN ORGANIZED HEALTH CARE EDUCATION/TRAINING PROGRAM

## 2022-12-31 PROCEDURE — 25000003 PHARM REV CODE 250: Performed by: SPECIALIST

## 2022-12-31 PROCEDURE — 63600175 PHARM REV CODE 636 W HCPCS: Performed by: STUDENT IN AN ORGANIZED HEALTH CARE EDUCATION/TRAINING PROGRAM

## 2022-12-31 PROCEDURE — 96374 THER/PROPH/DIAG INJ IV PUSH: CPT

## 2022-12-31 PROCEDURE — U0002 COVID-19 LAB TEST NON-CDC: HCPCS | Performed by: SPECIALIST

## 2022-12-31 PROCEDURE — 99284 PR EMERGENCY DEPT VISIT,LEVEL IV: ICD-10-PCS | Mod: ,,, | Performed by: OBSTETRICS & GYNECOLOGY

## 2022-12-31 PROCEDURE — 85025 COMPLETE CBC W/AUTO DIFF WBC: CPT | Performed by: STUDENT IN AN ORGANIZED HEALTH CARE EDUCATION/TRAINING PROGRAM

## 2022-12-31 PROCEDURE — 80053 COMPREHEN METABOLIC PANEL: CPT | Performed by: STUDENT IN AN ORGANIZED HEALTH CARE EDUCATION/TRAINING PROGRAM

## 2022-12-31 PROCEDURE — 96375 TX/PRO/DX INJ NEW DRUG ADDON: CPT

## 2022-12-31 PROCEDURE — 25000003 PHARM REV CODE 250: Performed by: OBSTETRICS & GYNECOLOGY

## 2022-12-31 PROCEDURE — 99284 EMERGENCY DEPT VISIT MOD MDM: CPT | Mod: 25

## 2022-12-31 RX ORDER — METOCLOPRAMIDE 10 MG/1
10 TABLET ORAL EVERY 6 HOURS
Qty: 30 TABLET | Refills: 0 | Status: SHIPPED | OUTPATIENT
Start: 2022-12-31 | End: 2023-03-22

## 2022-12-31 RX ORDER — DIPHENHYDRAMINE HCL 25 MG
25 CAPSULE ORAL EVERY 6 HOURS PRN
Qty: 30 CAPSULE | Refills: 0 | Status: SHIPPED | OUTPATIENT
Start: 2022-12-31 | End: 2023-03-22

## 2022-12-31 RX ORDER — DIPHENHYDRAMINE HYDROCHLORIDE 50 MG/ML
25 INJECTION INTRAMUSCULAR; INTRAVENOUS ONCE
Status: COMPLETED | OUTPATIENT
Start: 2022-12-31 | End: 2022-12-31

## 2022-12-31 RX ORDER — NIFEDIPINE 30 MG/1
30 TABLET, EXTENDED RELEASE ORAL DAILY
Qty: 30 TABLET | Refills: 11 | Status: SHIPPED | OUTPATIENT
Start: 2023-01-01 | End: 2023-03-22

## 2022-12-31 RX ORDER — METOCLOPRAMIDE HYDROCHLORIDE 5 MG/ML
10 INJECTION INTRAMUSCULAR; INTRAVENOUS ONCE
Status: COMPLETED | OUTPATIENT
Start: 2022-12-31 | End: 2022-12-31

## 2022-12-31 RX ORDER — NIFEDIPINE 30 MG/1
30 TABLET, EXTENDED RELEASE ORAL ONCE
Status: COMPLETED | OUTPATIENT
Start: 2022-12-31 | End: 2022-12-31

## 2022-12-31 RX ADMIN — METOCLOPRAMIDE 10 MG: 5 INJECTION, SOLUTION INTRAMUSCULAR; INTRAVENOUS at 10:12

## 2022-12-31 RX ADMIN — BUTALBITAL, ACETAMINOPHEN, AND CAFFEINE 1 TABLET: 50; 325; 40 TABLET, COATED ORAL at 02:12

## 2022-12-31 RX ADMIN — IBUPROFEN 800 MG: 400 TABLET, FILM COATED ORAL at 10:12

## 2022-12-31 RX ADMIN — NIFEDIPINE 30 MG: 30 TABLET, FILM COATED, EXTENDED RELEASE ORAL at 11:12

## 2022-12-31 RX ADMIN — DIPHENHYDRAMINE HYDROCHLORIDE 25 MG: 50 INJECTION INTRAMUSCULAR; INTRAVENOUS at 10:12

## 2022-12-31 RX ADMIN — IBUPROFEN 800 MG: 400 TABLET, FILM COATED ORAL at 01:12

## 2022-12-31 RX ADMIN — NIFEDIPINE 30 MG: 30 TABLET, FILM COATED, EXTENDED RELEASE ORAL at 05:12

## 2022-12-31 RX ADMIN — BUTALBITAL, ACETAMINOPHEN, AND CAFFEINE 1 TABLET: 50; 325; 40 TABLET, COATED ORAL at 10:12

## 2022-12-31 NOTE — NURSING
Dr. Bedolla to bedside - discussed discharge instructions including when to return to triage, following up with her OB, signs/symptoms to monitor, and prescriptions. Patient verbalized understanding of instructions. Written discharge instructions provided to patient and reviewed BP parameters for treatment. Peripheral IV removed. Accompanied patient off unit to meet  @ door for discharge.

## 2022-12-31 NOTE — PROGRESS NOTES
"                                                                                  hospital day 2      Pt is doing better with less headache this morning stating Fioricet has helped.  Patient currently sitting up in bed dressed for and would like to go home.    OBJECTIVE:     Vital Signs (Most Recent):  /85   Pulse 96   Temp 98.5 °F (36.9 °C) (Oral)   Resp 17   Ht 5' 2" (1.575 m)   Wt 77.1 kg (170 lb)   LMP 03/21/2022   SpO2 97%   Breastfeeding Yes   BMI 31.09 kg/m²   Blood pressures needed IV hydralazine last night and oral Procardia this morning  Yesterday blood pressure is normal on magnesium  Vital Signs Range (Last 24H):  Reviewed    I & O (Last 24H):  Intake/Output Summary (Last 24 hours)    Intake/Output Summary (Last 24 hours) at 12/31/2022 1100  Last data filed at 12/30/2022 1510  Gross per 24 hour   Intake 1462.92 ml   Output 600 ml   Net 862.92 ml         Physical Exam:    Abd: soft non tender, fundus firm   Ext: negative julian's sign, minimal edema    CBC:   Lab Results   Component Value Date/Time    WBC 7.56 12/30/2022 04:25 AM    RBC 4.62 12/30/2022 04:25 AM    HGB 12.0 12/30/2022 04:25 AM    HCT 38.4 12/30/2022 04:25 AM     12/30/2022 04:25 AM    MCV 83 12/30/2022 04:25 AM    MCH 26.0 (L) 12/30/2022 04:25 AM    MCHC 31.3 (L) 12/30/2022 04:25 AM     CMP:   Lab Results   Component Value Date/Time    GLU 95 12/30/2022 04:25 AM    CALCIUM 7.9 (L) 12/30/2022 04:25 AM    ALBUMIN 2.8 (L) 12/30/2022 04:25 AM    PROT 6.0 12/30/2022 04:25 AM     (L) 12/30/2022 04:25 AM    K 3.6 12/30/2022 04:25 AM    CO2 25 12/30/2022 04:25 AM     12/30/2022 04:25 AM    BUN 13 12/30/2022 04:25 AM    CREATININE 0.5 12/30/2022 04:25 AM    ALKPHOS 112 12/30/2022 04:25 AM    ALT 37 12/30/2022 04:25 AM    AST 24 12/30/2022 04:25 AM    BILITOT 0.6 12/30/2022 04:25 AM       ABO/Rh  A POS     S/p vag delivery with suspected postpartum preeclampsia status post Mag on BP meds Procardia 30 XL  Patient " Active Problem List   Diagnosis    Intramural leiomyoma of uterus    Migraine without aura, not intractable, without status migrainosus    Vulvar varices during pregnancy    Lactating mother    COVID-19 affecting pregnancy in first trimester    Exposure to varicella zoster virus (VZV)    Birth Center Risk Assessment: 0- Meets birth center guidelines    Pregnancy    Close exposure to COVID-19 virus    Elevated BP without diagnosis of hypertension     (normal spontaneous vaginal delivery)    Preeclampsia in postpartum period          D/c home on p.o. 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

## 2023-01-01 ENCOUNTER — PATIENT MESSAGE (OUTPATIENT)
Dept: OBSTETRICS AND GYNECOLOGY | Facility: HOSPITAL | Age: 33
End: 2023-01-01
Payer: COMMERCIAL

## 2023-01-01 NOTE — TELEPHONE ENCOUNTER
"Pt calling to report that after  2022 she was discharged home on 2022.   On 2022 she noted "terrible" headache, unrelieved by ibuprofen or tylenol. BP was 189/110 and was advised by Rajani Page to come in. She went to [facility] bc she lives in [place], [place]. She was diagnosed with preeclampsia and received magnesium sulfate over about 15 hours total. Doesn't think PC ratio was done. On CMP one of her liver enzymes was slightly elevated. BP was under control while on magnesium sulfate. Once mag was discontinued yesterday around 1600, BP went back up and they gave her hydralazine and labetalol and they started her on PO meds and sent her home this morning. She did feel better this am after procardia 30 mg dose and has continued to have a headache all day up to a 5 on pain scale and was told to take klonidine f0.1 mg or breakthrough headache between daily procardia doses. Bps today at home have been at the highest 150's/90's to 130's/80's.   Denies visual, epigastric pain, or sudden swelling in face or hands. Feels she is urinating usual amounts. 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 CNM to speak to house staff and call patient back with their advice.       "

## 2023-01-01 NOTE — ED PROVIDER NOTES
"Encounter Date: 2022       History     Chief Complaint   Patient presents with    Headache     HPI  Catie Pendleton is a 32 y.o.  who is s/p  on 22 who presents to the hospital with CC of a headache.   She has a significant PMH of migraines, and during her initial hospitalization for delivery had an elevated BP without a hypertensive disorder diagnosis. She was dischared home and doing well with intermittent dull headaches that were controlled with ibuprofen, however on PPD#6 noticed that she was having an acute worsening of her headache and her blood pressures were elevated so she presented to an OSH and was diagnosed with severe pre-e based on severe range blood pressures and a severe headache. Labs were normal at the OSH. She was pushed on with multiple doses of IV antihypertensives and was started on PO Procardia 30XL prior to discharge this morning. She states that her HA was feeling slightly better prior to discharge however right as she was leaving came back and has been present ever since. They gave her ibuprogen and fioricet with minimal relief. Also was given klonidine 0.1mg for breakthrough headache. Headache is mostly in the back of the head, but some in frontal region as well. Denies changes with position changes, and states that it is "fairly constant." Rates the headache at its worst a 5/10, but states that it is actually feeling a little better at this time.   She currently denies any vision changes, SOB, chest pain, RUQ pain, or increased swelling.       Review of patient's allergies indicates:   Allergen Reactions    Clindamycin Itching and Rash    Morphine Rash    Penicillins Hives and Rash     She was a baby and was told that she was allergic.    Sulfa (sulfonamide antibiotics) Rash     Past Medical History:   Diagnosis Date    Anal fissure 2020    Given suppositories (anusol to start)--f/u at 6week visit. Stool habits discussed    Eosinophilic fasciitis     When she " was 11 for about 2 years. 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    Hypertension     Migraine      Past Surgical History:   Procedure Laterality Date    OVARIAN CYST REMOVAL Right 2014     Family History   Problem Relation Age of Onset    Colon polyps Mother         Precancerous - Were removed    Hypertension Mother     Hyperlipidemia Mother     Patel's esophagus Mother     Hypertension Father     Hyperlipidemia Father     Diabetes Father     Melanoma Father     Lung cancer Maternal Grandfather     Diabetes Paternal Grandmother     Emphysema Paternal Grandfather         Was a smoker    Skin cancer Paternal Grandfather         Not sure which kind of skin cancer    Breast cancer Paternal Aunt 67    Ovarian cancer Neg Hx      Social History     Tobacco Use    Smoking status: Never    Smokeless tobacco: Never   Substance Use Topics    Alcohol use: Not Currently    Drug use: Never     Review of Systems   Constitutional:  Negative for fever.   HENT:  Negative for sinus pain and sore throat.    Eyes:  Negative for photophobia and visual disturbance.   Respiratory:  Negative for chest tightness and shortness of breath.    Cardiovascular:  Negative for chest pain and palpitations.   Gastrointestinal:  Negative for abdominal distention, abdominal pain, nausea and vomiting.   Genitourinary:  Negative for dysuria and vaginal bleeding.   Musculoskeletal:  Negative for back pain.   Skin:  Negative for rash.   Neurological:  Positive for headaches. Negative for dizziness, weakness and light-headedness.   Hematological:  Does not bruise/bleed easily.     Physical Exam     Initial Vitals [12/31/22 2207]   BP Pulse Resp Temp SpO2   (!) 146/90 74 16 96.8 °F (36 °C) 98 %      MAP       --         Physical Exam    Constitutional: She appears well-developed and well-nourished. She is not diaphoretic. No distress.   HENT:   Head: Normocephalic and atraumatic.   Eyes: EOM are normal.   Neck:   Normal range of motion.  Cardiovascular:  Normal  rate.           Pulmonary/Chest: No respiratory distress.   Abdominal: Abdomen is soft. Bowel sounds are normal. She exhibits no distension and no mass. There is no abdominal tenderness. There is no rebound and no guarding.   Musculoskeletal:         General: No tenderness or edema. Normal range of motion.      Cervical back: Normal range of motion.     Neurological: She is alert and oriented to person, place, and time.   Skin: Skin is warm and dry. No rash noted.   Psychiatric: She has a normal mood and affect.       ED Course   Procedures  Labs Reviewed   CBC W/ AUTO DIFFERENTIAL - Abnormal; Notable for the following components:       Result Value    MCH 26.4 (*)     MCHC 31.8 (*)     RDW 16.1 (*)     All other components within normal limits   COMPREHENSIVE METABOLIC PANEL - Abnormal; Notable for the following components:    Chloride 111 (*)     CO2 20 (*)     Albumin 3.0 (*)     All other components within normal limits          Imaging Results    None          Medications   metoclopramide HCl injection 10 mg (10 mg Intravenous Given 12/31/22 2257)   diphenhydrAMINE injection 25 mg (25 mg Intravenous Given 12/31/22 2259)   NIFEdipine 24 hr tablet 30 mg (30 mg Oral Given 12/31/22 2304)     Medical Decision Making:   ED Management:  VS with mild range blood pressures, initially in the 140s systolic and 1 in the 150s systolic  Only complaint at this time is her headache. IV benadryl and reglan given initially in triage for her symptoms and her headache was much improved after approximately 30 minutes  CBC wnl  CMP wnl  Additional dose of procardia 30XL given in the J [facility]IS and will plan to increase dose to 60 XL qd  Stable for discharge home at this time with strict return precautions.           Attending Attestation:   Physician Attestation Statement for Resident:  As the supervising MD   Physician Attestation Statement: I have personally seen and examined this patient.   I agree with the above history.  -:   As the  supervising MD I agree with the above PE.     As the supervising MD I agree with the above treatment, course, plan, and disposition.   -: HA improved significantly with reglan/benadryl.  Increased procardia to 60mg daily.  Strict HA precautions reviewed for return evaluation.  S/p Mag at OSH.  I was personally present during the critical portions of the procedure(s) performed by the resident and was immediately available in the ED to provide services and assistance as needed during the entire procedure.  I have reviewed and agree with the residents interpretation of the following: lab data.  I have reviewed the following: old records at this facility.                           Clinical Impression:   Final diagnoses:  [I10] Hypertension, unspecified type (Primary)  [G43.819] Other migraine without status migrainosus, intractable        ED Disposition Condition    Discharge Stable          ED Prescriptions       Medication Sig Dispense Start Date End Date Auth. Provider    metoclopramide HCl (REGLAN) 10 MG tablet Take 1 tablet (10 mg total) by mouth every 6 (six) hours. 30 tablet 12/31/2022 -- Nidhi Chaudhary MD    diphenhydrAMINE (BENADRYL) 25 mg capsule Take 1 capsule (25 mg total) by mouth every 6 (six) hours as needed (headache). 30 capsule 12/31/2022 -- Nidhi Chaudhary MD          Follow-up Information    None          Nidhi Chaudhary MD  Resident  12/31/22 7704       Dianelys Triplett MD  01/01/23 0027

## 2023-01-13 ENCOUNTER — PATIENT MESSAGE (OUTPATIENT)
Dept: OBSTETRICS AND GYNECOLOGY | Facility: CLINIC | Age: 33
End: 2023-01-13
Payer: COMMERCIAL

## 2023-02-07 ENCOUNTER — TELEPHONE (OUTPATIENT)
Dept: OBSTETRICS AND GYNECOLOGY | Facility: CLINIC | Age: 33
End: 2023-02-07
Payer: COMMERCIAL

## 2023-02-07 NOTE — TELEPHONE ENCOUNTER
Called and left [facility] phone number at [phone] to schedule PP Visit in March.

## 2023-02-16 ENCOUNTER — POSTPARTUM VISIT (OUTPATIENT)
Dept: OBSTETRICS AND GYNECOLOGY | Facility: CLINIC | Age: 33
End: 2023-02-16
Payer: COMMERCIAL

## 2023-02-16 VITALS
BODY MASS INDEX: 30.59 KG/M2 | WEIGHT: 166.25 LBS | DIASTOLIC BLOOD PRESSURE: 70 MMHG | HEIGHT: 62 IN | SYSTOLIC BLOOD PRESSURE: 112 MMHG

## 2023-02-16 DIAGNOSIS — Z30.09 ENCOUNTER FOR OTHER GENERAL COUNSELING OR ADVICE ON CONTRACEPTION: ICD-10-CM

## 2023-02-16 DIAGNOSIS — F41.8 POSTPARTUM ANXIETY: ICD-10-CM

## 2023-02-16 DIAGNOSIS — R10.2 PELVIC PAIN: ICD-10-CM

## 2023-02-16 PROCEDURE — 99999 PR PBB SHADOW E&M-EST. PATIENT-LVL III: CPT | Mod: PBBFAC,,, | Performed by: ADVANCED PRACTICE MIDWIFE

## 2023-02-16 PROCEDURE — 0503F PR POSTPARTUM CARE VISIT: ICD-10-PCS | Mod: CPTII,S$GLB,, | Performed by: ADVANCED PRACTICE MIDWIFE

## 2023-02-16 PROCEDURE — 0503F POSTPARTUM CARE VISIT: CPT | Mod: CPTII,S$GLB,, | Performed by: ADVANCED PRACTICE MIDWIFE

## 2023-02-16 PROCEDURE — 99999 PR PBB SHADOW E&M-EST. PATIENT-LVL III: ICD-10-PCS | Mod: PBBFAC,,, | Performed by: ADVANCED PRACTICE MIDWIFE

## 2023-02-16 RX ORDER — ESCITALOPRAM OXALATE 10 MG/1
TABLET ORAL
Qty: 30 TABLET | Refills: 2 | Status: SHIPPED | OUTPATIENT
Start: 2023-02-16 | End: 2023-03-22

## 2023-02-16 NOTE — PROGRESS NOTES
Postpartum Visit  Catie Pendleton is a 33 y.o. female  is here for a postpartum visit. She is 7 weeks postpartum following a spontaneous vaginal delivery, of a male infant weighinlb 6oz, with Anesthesia: epidural. . The delivery was at 39w 2d. Accompanied today by her significant other, Jose, and their infant.     Pregnancy was complicated by: fibroids, h/o migraines, covid-19 infection in 1st trimester, PreEclampsia in postpartum period - not on meds any longer. Stopped them with low blood pressure    Postpartum course has been uncomplicated.    Bleeding no bleeding. Bowel/ bladder function is normal - reports small anal fissure two weeks postpartum, which has now fully healed/resolved.  Her last pap was: 2022 - NILM; HR HPV negative.    Patient is not sexually active.   Desired contraception method is paragaurd. History of very heavy menses (anemic with menses).    Postpartum depression screening: positive - feelings of anxiety. Reports feeling more difficulty coping in day to day life, than she had with prior two postpartum times. Reports feelings of barely maintaining everything she's juggling and feeling like she's going to drop a very important ball at any moment. Moving into a new house they purchased beginning of Dec and acknowledges the timing of this additional stressor does not help. Reports she felt very anxious doing routine medical care (going to dentist) earlier this week in a setting she has never felt anxious in prior. Describes needing to breath through stressful situations regularly to help regulate her feelings. Feeling as though she is overthinking many things, but cant stop. She is interested in therapy and discussing meds today.  EPDS 9.    Pt reports significant pubic symphysis pain since delivery with any increased activity, prolonged standing, or certain movements. Reports she started to try to begin walking/increased gentle exercise again over this last week but couldn't  continue, secondary to this pain. Denies vulvovaginal irritation or pain. Denies any s/s UTI. Desires referral to Pelvic Floor PT.    Baby's course has been uncomplicated. Baby is feeding by breast.     ROS:  GENERAL: No fever, chills, fatigability.  VULVAR: No pain, no lesions and no itching.  VAGINAL: No relaxation, no itching, no discharge, no abnormal bleeding and no lesions.  ABDOMEN: No abdominal pain. Denies nausea. Denies vomiting. No diarrhea. No constipation  BREAST: Denies pain. No lumps. No discharge.  URINARY: No incontinence, no nocturia, no frequency and no dysuria.  CARDIOVASCULAR: No chest pain. No shortness of breath. No leg cramps.  NEUROLOGICAL: No headaches. No vision changes.    Past Medical History:   Diagnosis Date    Anal fissure 12/09/2020    Given suppositories (anusol to start)--f/u at 6week visit. 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    Hypertension     Migraine      Past Surgical History:   Procedure Laterality Date    OVARIAN CYST REMOVAL Right 2014     Review of patient's allergies indicates:   Allergen Reactions    Clindamycin Itching and Rash    Morphine Rash    Penicillins Hives and Rash     She was a baby and was told that she was allergic.    Sulfa (sulfonamide antibiotics) Rash       Current Outpatient Medications:     diphenhydrAMINE (BENADRYL) 25 mg capsule, Take 1 capsule (25 mg total) by mouth every 6 (six) hours as needed (headache)., Disp: 30 capsule, Rfl: 0    EScitalopram oxalate (LEXAPRO) 10 MG tablet, Take 0.5 tablets (5 mg total) by mouth once daily for 7 days, THEN 1 tablet (10 mg total) once daily., Disp: 30 tablet, Rfl: 2    ferrous sulfate 325 (65 FE) MG EC tablet, TAKE 1 TABLET BY MOUTH ONCE DAILY., Disp: 90 tablet, Rfl: 0    metoclopramide HCl (REGLAN) 10 MG tablet, Take 1 tablet (10 mg total) by mouth every 6 (six) hours., Disp: 30 tablet, Rfl: 0    NIFEdipine (PROCARDIA-XL) 30 MG  (OSM) 24 hr tablet, Take 1 tablet (30 mg total) by mouth once daily., Disp: 30 tablet, Rfl: 11      Vitals:    02/16/23 1357   BP: 112/70       General appearance - alert, well appearing, and in no distress and oriented to person, place, and time  Mental status - alert, oriented to person, place, and time, anxious and tearful mood, behavior, speech, dress, motor activity, and thought processes  Skin - coloration normal for race, good turgor, warm to touch, no rashes  Abdomen - soft, nontender, nondistended, no masses or organomegaly  Pelvic -   External genitalia postpartum: normal, well-healed, without lesions or masses.  Normal female hair distribution. Adequate perineal body. Urethral meatus without lesions or prolapse. Urethra: no masses, tenderness, or scarring.  Bladder: without tenderness or masses.  Vaginal mucosa moist and pink, normal rugae, without lesions, abnormal discharge, or foul odor.  Cervix pink, no lesions, no cervical motion tenderness.  Uterus: midline, non tender, smooth, not enlarged, not prolapsed  No adnexal masses or tenderness.  Extremities - no edema, redness or tenderness in the calves or thighs      Catie was seen today for postpartum care.    Diagnoses and all orders for this visit:    Postpartum care and examination  -     Ambulatory referral/consult to Physical/Occupational Therapy; Future  Counseling regarding resuming normal activities of exercise and work.  Postpartum precautions reviewed  Numerous questions about nutrition and caloric needs with breastfeeding addressed today.    Postpartum anxiety  -     EScitalopram oxalate (LEXAPRO) 10 MG tablet; Take 0.5 tablets (5 mg total) by mouth once daily for 7 days, THEN 1 tablet (10 mg total) once daily.   Spoke to patient at length about SE profile of medications used for anxiety/depression. Explained that benefits of medications typically take 2-4 weeks to occur and up to 8-12 weeks. Instructed not to stop medication abruptly.  Patient educated about adjunct treatments including but not limited to counseling, avoidance of stimulants including caffeine, participation in regular exercise, yoga, meditation, and breathing exercises. Patient voiced understanding.   - Pt reports she will reach out to local counselors (they live in [place], [place]) and message us if she desires further resources regarding this.    Encounter for other general counseling or advice on contraception  -     Device Authorization Order  Discussed contraception at length - reviewed LARC vs pill options and reviewed risks/benefits to both. Patient most interested in Paragard initially but discussed potential risks with history of heavy menses. Discussed Mirena and patient desires this. Advised condoms or continued abstinence until device can be place. Prior authorization ordered.    Pelvic pain  -     Ambulatory referral/consult to Physical/Occupational Therapy; Future      Routine follow up in 7-14 d for IUD placement      I spent a total of 70 minutes on the day of the visit. This includes face to face time and non-face to face time preparing to see the patient (eg, review of tests), Obtaining and/or reviewing separately obtained history, Documenting clinical information in the electronic or other health record, Independently interpreting resultsand communicating results to the patient/family/caregiver, or Care coordination.     Catie Maynard, KELLYM, MSN

## 2023-03-02 ENCOUNTER — PATIENT MESSAGE (OUTPATIENT)
Dept: RESEARCH | Facility: HOSPITAL | Age: 33
End: 2023-03-02
Payer: COMMERCIAL

## 2023-03-03 ENCOUNTER — CLINICAL SUPPORT (OUTPATIENT)
Dept: REHABILITATION | Facility: HOSPITAL | Age: 33
End: 2023-03-03
Payer: COMMERCIAL

## 2023-03-03 DIAGNOSIS — M62.08 DIASTASIS RECTI: ICD-10-CM

## 2023-03-03 DIAGNOSIS — R10.2 PELVIC PAIN: Primary | ICD-10-CM

## 2023-03-03 DIAGNOSIS — M54.50 LOW BACK PAIN AT MULTIPLE SITES: ICD-10-CM

## 2023-03-03 DIAGNOSIS — M62.81 MUSCLE WEAKNESS: ICD-10-CM

## 2023-03-03 PROCEDURE — 97162 PT EVAL MOD COMPLEX 30 MIN: CPT | Mod: PN

## 2023-03-03 PROCEDURE — 97530 THERAPEUTIC ACTIVITIES: CPT | Mod: PN

## 2023-03-03 NOTE — PATIENT INSTRUCTIONS
PELVIC FLOOR PRESSURE MANAGEMENT         Your pelvic floor muscles and your diaphragm work closely together to regulate the pressure in your body. Each time you inhale, your diaphragm contracts, flattens, and moves downward. In response, your pelvic floor muscles relax and drop down as well. When you exhale, both muscles lift upwards. Throughout a diaphragmatic breath cycle, your pelvic floor goes through a full range of motion that contributes to its optimal function.    Think of your body like a canister, with one opening at the top where your mouth is and another opening at the bottom through your pelvic floor. If the top of the canister is closed off (as with straining during bowel movements or Valsalva maneuver during lifting) all of the pressure moves downward. If your pelvic floor muscles are not strong enough to counteract this increased pressure, you may experience leaking or increased sensations of pelvic organ prolapse.     This is why it is extremely important to implement the following pressure management techniques:  Avoiding Constipation - make high-fiber foods part of your regular diet (fruits, vegetables, bran, and beans), reduce the amount of processed foods in your diet, drink plenty of water and fluids every day, exercise daily, and manage your stress with meditation or other relaxation techniques.  No Straining! Straining (bearing down and holding your breath) puts additional downward pressure on our organs, causing increased prolapse and pelvic pressure. Instead, blow out the candles as you gently push down. Do NOT hold your breath!  Use a Stool - Utilize a squat position when voiding. Get your knees up higher than your hips. Squatting helps relax the pelvic floor muscles and reduces straining.  Avoid heavy lifting and high-impact activities until you can strengthen your core and pelvic floor muscles appropriately. When you do have to lift, hold the load close to your body to reduce  strain and do not hold your breath when lifting.  Do not Valsalva which is holding your breath and pushing at the same time.  Use diaphragmatic breathing (belly breathing) to manage stress, help empty bladder, help empty bowels, and help lightly stretch pelvic floor muscles.      NURSING ERGONOMICS  You may have heard of ergonomics for typing at a keyboard or for lifting, but there is also an ergonomic way to breastfeed. Positioning yourself and your baby for comfort and efficiency breast-feeding can reduce your risk for shoulder and upper/mid back discomfort. Nursing every few hours during the day can add up, and as your baby grows, it's important to make sure you are both in the proper alignment.    PROPER POSITIONING FOR NURSING  - Ensure all needs are within reach before starting to nurse  - Sit with feet on the ground, all the way back on a chir/sofa  - Keep your shoulders relaxed  - Avoid looking down at your baby the whole time  - Bring your baby to the nipple, not the other way around  - Prop the baby up with boppy, pillow, rolled towel so you don't have to strain your arms

## 2023-03-03 NOTE — PROGRESS NOTES
"[facility]  Pelvic Health Physical Therapy Initial Evaluation    Date: 3/3/2023   [facility]  Clinic Number: [id number]  Therapy Diagnosis:   Encounter Diagnoses   Name Primary?    Postpartum care and examination     Pelvic pain Yes    Low back pain at multiple sites     Muscle weakness     Diastasis recti      Physician: Catie Maynard CNM    Physician Orders: PT Eval and Treat   Medical Diagnosis from Referral: Postpartum care and examination [Z39.2], Pelvic pain [R10.2]  Evaluation Date: 3/3/2023  Authorization Period Expiration: 2023 - 2024  Plan of Care Expiration: 2023  Visit # / Visits authorized:     Time In: 09:19AM  Time Out: 10:00AM  Total Appointment Time (timed & untimed codes): 41 minutes    Precautions: Standard    Subjective     Date of onset: 2023    History of current condition - Catie reports: she gave birth (vaginally) 2023 with a small superficial tear. She has pain at the pubic bone, hips and lower back. She is unable to tolerate car rides longer than 15-20 minutes. She has trouble lifting and carrying toddler (32lbs), getting in/out of car, maintaining position for breastfeeding. Catie also states she has a diastasis recti and a history of 2 "rectal tears" with bowel movements after vaginal deliveries in 2020 and 2023.    OB/GYN History: , vaginal delivery, perineal laceration, pushing phase under 30 minutes, and uterine fibroids(1). Vaginal delivery dates - 2012, 2020.  Sexually active? Yes  Pain with vaginal exams, intercourse or tampon use? Yes, has had sex once since giving birth and it was sore but not unbearable.    Pain:  Location: Pubis symphysis, hips (lateral) and low back  Current 2/10, worst 6/10, best 0/10   Description: Aching, Dull, and Throbbing  Aggravating Factors/Activities that cause symptoms: Walking, Exercise, Bending, Squatting, Lifting, and Traveling   Easing Factors: " relaxation, rest, and NSAIDs    Bladder/Bowel History: No issues per patient's report.   Frequency of urination:   Daytime: 5x per day           Nighttime: None  Difficulty initiating urine stream: No  Urine stream: strong  Complete emptying: Yes  Bladder leakage: No  Frequency of incidents: N/A  Amount leaked (urine):  None  Urinary Urgency: No  Frequency of bowel movements: once a day  Difficulty initiating BM: No  Quality/Shape of BM: [place] Stool Chart Type 4 (soft)  Does Patient Feel Empty after BM? Yes  Fiber Supplements or Laxative Use? No  Colon leakage: No  Frequency of incidents: N/A   Amount leaked (bowels):  None  Form of protection: none  Number of pads required in 24 hours: N/A       Medical History: Catie  has a past medical history of Anal fissure (12/09/2020), Eosinophilic fasciitis, Hypertension, and Migraine.     Surgical History: Catie Pendleton  has a past surgical history that includes Ovarian cyst removal (Right, 2014).    Medications: Catie has a current medication list which includes the following prescription(s): diphenhydramine, escitalopram oxalate, ferrous sulfate, metoclopramide hcl, and nifedipine.    Allergies:   Review of patient's allergies indicates:   Allergen Reactions    Clindamycin Itching and Rash    Morphine Rash    Penicillins Hives and Rash     She was a baby and was told that she was allergic.    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 Job-related duties include sitting (2 days per week) and standing, walking, lifting, etc 2 days per week.  Prior Level of Function: Prior to most recent pregnancy, patient tolerated prolonged sitting and exercise (yoga, strengthening, elliptical) without pain and/or difficulty.  Current Level of Function: Patient is currently  limited in her ability to sit for prolonged periods of time, an occupational requirement; exercise and lift/carry 2-year-old toddler without increased pain.     Types of fluid intake: Not discussed at initial evaluation.  Diet: Not discussed at initial evaluation.   Habitus: well developed, well nourished  Abuse/Neglect: No     Pts goals: To be able to exercise/workout without pain.    OBJECTIVE     See EMR under MEDIA for written consent provided 3/3/2023  Chaperone: declined    ORTHO SCREEN  Posture in sitting: slouched  and left leg crossed over right leg  Posture in standing: sway back  Pelvic alignment: left posterior rotated ilium    SI Joint Palpation: Denies tenderness to SI joint palpation bilaterally.  Sacral spring test: negative   Adductor Palpation:  No tenderness  Hip Strength:  Hip Left Right   Flexion 4-/5 4-/5   Abduction 4/5 4/5   Adduction 4/5 4/5   Extension 4-/5 4-/5   External Rot 4/5 4/5   Internal Rot 4/5 4/5       ABDOMINAL WALL ASSESSMENT  Palpation: trigger points left external obliques  Abdominal strength: Transverse abdominus: 3+/5  Scarring: None  Pelvic Floor Muscle and Transverse Abdominus Synergy: present  Diastasis: present     Linea alba at rest: Bogginess    Curl-up test for linea alba integrity:    Without transverse abdominus: Doming    Distortion with transverse abdominus: Mild increase in tension generation    Curl-up test for Inter-rectus distance (IRD):    Without transverse abdominus: greater than 2 fingers width at the umbilicus, greater than 2 fingers width 2 inches above umbilicus, 2 fingers width 2 inches below umbilicus    With transverse abdominus contraction: greater than 2 fingers width at the umbilicus, 2 fingers width 2 inches above umbilicus, 2 fingers width 2 inches below umbilicus    Abdominal wall musculature dominance: External oblique dominant: sternal angle decreases, and linea alba bulges      Special Tests for Load Transfer Assessment Between the Trunk  and Lower Extremities:    Active Straight Leg Test:   Right: pelvic rotation or drop    Left: pelvic rotation or drop  and sense of heaviness or weakness      Hip and SIJ Special Tests:   CYNDY Test: Negative     BREATHING MECHANICS ASSESSMENT   Thorax Assessment During Quiet Respiration: Accessory motion use (upper trapezius)  Thorax Assessment During Deep Respiration: Decreased excursion bilaterally of lateral ribs       Limitation/Restriction for FOTO  Survey - Not completed on 3/3/2023 - will revisit at next visit.       TREATMENT     Treatment Time In: 9:50AM  Treatment Time Out: 10:00AM  Total Treatment time (time-based codes) separate from Evaluation: 10 minutes      Therapeutic Exercise to develop strength, endurance, ROM, flexibility, posture, and core stabilization for 00 minutes including:      Neuromuscular Re-education to develop Coordination, Control, Down training, Posture, Proprioception, and Kinesthetic for 00 minutes including:       Manual Therapy to develop flexibility, extensibility, and desensitization for 00 minutes including:       Therapeutic Activity Patient participated in dynamic functional therapeutic activities to improve functional performance for 10 minutes. Including: Education as described below.     Self-Care for 00 minutes including:        Patient Education provided:   benefits of treatment was discussed with the pt. Additionally, posture (standing, sleeping and breastfeeding), transverse abdominis activation, and pressure management strategies  was reviewed.     Home Exercises provided:  Written Home Exercises provided: yes.  Exercises were reviewed and Catie was able to demonstrate them prior to the end of the session.    Catie demonstrated good  understanding of the education provided.     See EMR under Patient Instructions for exercises provided 3/3/2023.    Assessment     Catie is a 33 y.o. female referred to outpatient Physical Therapy with a medical diagnosis of Postpartum  care and examination [Z39.2], Pelvic pain [R10.2]. Patient presents with altered posture, poor knowledge of body mechanics and posture, and poor trunk stability; core and gross B hip weakness, pelvic asymmetry, diastasis rectus abdominis, poor breathing mechanics and trigger points to left external oblique - all of which likely contributes to chief complaints of increased pain with exercise, movement and prolonged sitting. Deficits limit patient's ability to sit for prolonged periods of time, an occupational requirement; exercise and lift/carry 2-year-old toddler without increased pain.     Pt prognosis is Good.   Pt will benefit from skilled outpatient Physical Therapy to address the deficits stated above and in the chart below, provide pt/family education, and to maximize pt's level of independence.     Plan of care discussed with patient: Yes  Pt's spiritual, cultural and educational needs considered and patient is agreeable to the plan of care and goals as stated below:     Anticipated Barriers for therapy: none    Medical Necessity is demonstrated by the following:    History  Co-morbidities and personal factors that may impact the plan of care Co-morbidities    has a past medical history of Anal fissure (12/09/2020), Eosinophilic fasciitis, Hypertension, and Migraine.   has a past surgical history that includes Ovarian cyst removal (Right, 2014).  3 vaginal deliveries.    Personal Factors  lifestyle     moderate   Examination  Body structures and functions, activity limitations and participation restrictions that may impact the plan of care Body Regions/Systems/Functions:    altered posture, decreased strength    Activity Limitations:  Participating in functional, dynamic activities due to increased pain.    Participation Restrictions:  exercise restrictions due to pain     Activity limitations:   Learning and applying knowledge  no deficits    General Tasks and Commands  no deficits    Communication  no  deficits    Mobility  driving (bike, car, motorcycle)    Self care  no deficits    Domestic Life  doing house work (cleaning house, washing dishes, laundry)    Interactions/Relationships  no deficits    Life Areas  employment    Community and Social Life  no deficits       moderate   Clinical Presentation evolving clinical presentation with changing clinical characteristics moderate   Decision Making/ Complexity Score: moderate       Goals:  Short Term Goals: 2 weeks   Patient to be 100% independent with home exercise program to facilitate optimal recovery.  Patient to be able to isolate transverse abdominis muscle and prevent rectus abdominis doming during transitional movements.  Patient to be educated in pressure management strategies to prevent hernia development.  Trigger points at left external oblique to resolve 100% to allow full excursion diaphragmatic breathing and optimal posture needed for functional, dynamic movement.  Patient to be educated in proper ergonomics and breastfeeding posture to prevent exacerbation of symptoms.    Long Term Goals: 12 weeks   Patient to be able to tolerate a 1-hour car ride to be able to attend work and work-related meetings/training.  Patient to tolerate 2-mile walk without increased pelvic/low back pain for improved cardiovascular health.  Core strength to improve by 1/2 muscle grade to improve stabilization during transitional movements and with lifting 2-year-old toddler.  Patient to report 0/10 pelvic pain with IADLs and all forms of exercise for improved overall health.    Plan     Plan of care Certification: 3/3/2023 to 05/26/2023.    Outpatient Physical Therapy 1 times weekly for 12 weeks to include the following interventions: therapeutic exercises, therapeutic activity, neuromuscular re-education, manual therapy, modalities PRN, patient/family education, dry needling, and self care/home management. Skilled therapy intervention addressing the evaluative findings and  impairments noted above may include: Patient related information education regarding bladder, bowel, and pelvic floor anatomy and function; bladder and bowel health education to include bladder, bowel and food diary as needed. Incorporation of bowel and bladder health and dietary recommendations; instructions in bowel management to include diet, fluids, habit training, and use of supplemental fiber; activities of daily living training to include toileting position modifications and strategies for voiding and defecation. Manual therapy as indicated to include myofascial release/mobilization, connective tissue manipulation, soft tissue mobilization, trigger point release, visceral mobilization, joint mobilization, and muscle energy techniques - techniques to be performed externally and internally with vaginal and/or rectal approach as necessary to address abdomino-pelvic musculoskeletal dysfunction contributing to diagnosis. Therapeutic exercise instruction for pelvic floor muscle strength and relaxation, core strengthening and trunk stabilization with extremity disassociation, hip flexibility, maintenance of neutral pelvic positioning in sitting, standing and lying down, and with transitional movements. Neuromuscular reeducation for pelvic floor muscle awareness and relaxation response training. EMG and/or biofeedback for pelvic floor musculature training with external and/or internal vaginal or rectal electrode/pressure sensors as indicated. Rectal balloon sensory assessment and retraining. Instruction and review of stress reduction/autonomic nervous system quieting strategies stresses to address sympathetic nervous system response and encourage parasympathetic nervous response to address constipation (symptoms). Incorporation of appropriate treatment strategies into an independent home exercise program.     Radha Blackman, PT

## 2023-03-10 ENCOUNTER — CLINICAL SUPPORT (OUTPATIENT)
Dept: REHABILITATION | Facility: HOSPITAL | Age: 33
End: 2023-03-10
Payer: COMMERCIAL

## 2023-03-10 DIAGNOSIS — M54.50 LOW BACK PAIN AT MULTIPLE SITES: ICD-10-CM

## 2023-03-10 DIAGNOSIS — M62.81 MUSCLE WEAKNESS: ICD-10-CM

## 2023-03-10 DIAGNOSIS — R10.2 PELVIC PAIN: Primary | ICD-10-CM

## 2023-03-10 DIAGNOSIS — M62.08 DIASTASIS RECTI: ICD-10-CM

## 2023-03-10 PROCEDURE — 97530 THERAPEUTIC ACTIVITIES: CPT | Mod: PN

## 2023-03-10 PROCEDURE — 97112 NEUROMUSCULAR REEDUCATION: CPT | Mod: PN

## 2023-03-10 PROCEDURE — 97140 MANUAL THERAPY 1/> REGIONS: CPT | Mod: PN

## 2023-03-10 NOTE — PROGRESS NOTES
Outpatient Pelvic Health Physical Therapy Daily Note       Patient Name: Catie Pendleton  Clinic Number: [id number]    Therapy Diagnosis:   Encounter Diagnoses   Name Primary?    Diastasis recti     Muscle weakness     Pelvic pain Yes    Low back pain at multiple sites      Physician: Catie Maynard CNM    Visit Date: 3/10/2023    Physician Orders: PT Eval and Treat   Medical Diagnosis from Referral: Postpartum care and examination [Z39.2], Pelvic pain [R10.2]  Evaluation Date: 3/3/2023  Authorization Period Expiration: 02/16/2023 - 02/16/2024  Plan of Care Expiration: 05/26/2023  Visit # / Visits authorized: 1/ 6; Total - 2    Time In: 09:10am  Time Out: 10:00am  Total Billable Time: 50 minutes    Precautions: Grade 2-3 cystocele    SUBJECTIVE   Pt reports: she believes she is having an autoimmune disease flare that's causing general pain. She also believes the anal fissure is reoccurring. Patient agrees to participate with skilled PT services at this time.      She was compliant with home exercise program.  Response to previous treatment: first visit after initial evaluation  Functional change: none reported    Pain: 2/10   Pain location: Pubis symphysis    OBJECTIVE   Informed verbal consent provided 3/10/2023 prior to intravaginal treatment.  Chaperone: declined    See EMR under MEDIA for written consent provided 3/10/2023  Chaperone: declined     VAGINAL PELVIC FLOOR EXAM    EXTERNAL ASSESSMENT  Introitus: gaping  Skin condition: redness noted  Scarring: Unable to view perineal scar   Sensation: WNL   Pain:  none  Voluntary contraction: visible lift  Voluntary relaxation: bulge  Involuntary contraction: prolapse  Bearing down: prolapse  Perineal descent: present      INTERNAL ASSESSMENT  Pain: trigger points as follows: Left levator ani, bulbospongiosus and obturator internus   Sensation: able to localized pressure appropriately   Vaginal vault: roomy   Muscle Bulk: hypertonus at left side  Muscle Power:  4-/5  Muscle Endurance: 8 sec  # Reps To Fatigue: 5    Fast Contractions in 10 seconds: 6     Quality of contraction: slow rise and decreased hold   Specificity: patient contracts: gluteal musculature   Coordination: tends to hold breath during PFM contration and cannot isolate PFM from abdominals   Prolapse check:Grade 2-3 cystocele and Grade 2 rectocele (cystocele assessed in supine and standing)    TREATMENT       Therapeutic Exercise to develop  strength, endurance, ROM, flexibility, posture, and core stabilization for 00 minutes including:     Neuromuscular Re-education to develop coordination, awareness, posture, stabilization and kinesthetic sense for 25 minutes including:    Pelvic floor coordination training - inhale/relax, exhale/contract, bearing down  Pelvic bracing (kegel, transverse abdominis, posterior pelvic tilt)  Diaphragmatic breathing    Manual Therapy to develop flexibility, extensibility, and desensitization for 10 minutes including:    Trigger point release/myofascial mobilization to left levator ani & bulbospongiosus    Therapeutic Activity to improve understanding, knowledge of condition, bowel/bladder health, functional static and dynamic mobility for 15 minutes including education as described below:    Vaginal pelvic floor examination  Patient education for pressure management strategies, cystocele and rectocele precautions, proper toileting mechanics with use of squatty potty    Education: Discussed progression of plan of care with patient; educated pt in activity modification; reviewed HEP with pt. Pt demonstrated and verbalized understanding of all instruction and was provided with a handout of HEP (see Patient Instructions).      ASSESSMENT      Today's session focused on pelvic floor coordination and awareness, pressure management and pelvic stabilization in efforts to mitigate symptoms. A vaginal pelvic floor examination revealed trigger points to pelvic floor musculature,  left>right as well as a cystocele and rectocele. Patient tolerated entire treatment well with no visible adverse effects.     Will the patient continue to benefit from skilled PT intervention? Yes    Progress towards goals:  good    Goals:  Short Term Goals: 2 weeks   Patient to be 100% independent with home exercise program to facilitate optimal recovery.  Patient to be able to isolate transverse abdominis muscle and prevent rectus abdominis doming during transitional movements.  Patient to be educated in pressure management strategies to prevent hernia development.  Trigger points at left external oblique to resolve 100% to allow full excursion diaphragmatic breathing and optimal posture needed for functional, dynamic movement.  Patient to be educated in proper ergonomics and breastfeeding posture to prevent exacerbation of symptoms.     Long Term Goals: 12 weeks   Patient to be able to tolerate a 1-hour car ride to be able to attend work and work-related meetings/training.  Patient to tolerate 2-mile walk without increased pelvic/low back pain for improved cardiovascular health.  Core strength to improve by 1/2 muscle grade to improve stabilization during transitional movements and with lifting 2-year-old toddler.  Patient to report 0/10 pelvic pain with IADLs and all forms of exercise for improved overall health.    PLAN     Continue with established Plan of Care, working toward established PT goals.      Plan of care Certification: 3/3/2023 to 05/26/2023.     Outpatient Physical Therapy 1 times weekly for 12 weeks to include the following interventions: therapeutic exercises, therapeutic activity, neuromuscular re-education, manual therapy, modalities PRN, patient/family education, dry needling, and self care/home management. Skilled therapy intervention addressing the evaluative findings and impairments noted above may include: Patient related information education regarding bladder, bowel, and pelvic floor  anatomy and function; bladder and bowel health education to include bladder, bowel and food diary as needed. Incorporation of bowel and bladder health and dietary recommendations; instructions in bowel management to include diet, fluids, habit training, and use of supplemental fiber; activities of daily living training to include toileting position modifications and strategies for voiding and defecation. Manual therapy as indicated to include myofascial release/mobilization, connective tissue manipulation, soft tissue mobilization, trigger point release, visceral mobilization, joint mobilization, and muscle energy techniques - techniques to be performed externally and internally with vaginal and/or rectal approach as necessary to address abdomino-pelvic musculoskeletal dysfunction contributing to diagnosis. Therapeutic exercise instruction for pelvic floor muscle strength and relaxation, core strengthening and trunk stabilization with extremity disassociation, hip flexibility, maintenance of neutral pelvic positioning in sitting, standing and lying down, and with transitional movements. Neuromuscular reeducation for pelvic floor muscle awareness and relaxation response training. EMG and/or biofeedback for pelvic floor musculature training with external and/or internal vaginal or rectal electrode/pressure sensors as indicated. Rectal balloon sensory assessment and retraining. Instruction and review of stress reduction/autonomic nervous system quieting strategies stresses to address sympathetic nervous system response and encourage parasympathetic nervous response to address constipation (symptoms). Incorporation of appropriate treatment strategies into an independent home exercise program.      Radha Blackman, PT

## 2023-03-10 NOTE — PATIENT INSTRUCTIONS
Home Exercise Program: 03/10/2023    Kegels in supine:    1. Lie comfortably with legs and buttocks relaxed.  2. Squeeze and LIFT the muscles that stop the flow of urine and passage of gas.  Keep legs and buttock muscles quiet.  3. Hold lift for 5 seconds without holding breath.  4. Release and DROP the pelvic floor muscles for 10 seconds.  5. Repeat 10 times, 2 sets per day. Spread throughout the day.    No Kegels while urinating!

## 2023-03-20 PROBLEM — Z13.9 RISK AND FUNCTIONAL ASSESSMENT: Status: RESOLVED | Noted: 2022-05-26 | Resolved: 2023-03-20

## 2023-03-22 ENCOUNTER — OFFICE VISIT (OUTPATIENT)
Dept: FAMILY MEDICINE | Facility: CLINIC | Age: 33
End: 2023-03-22
Payer: COMMERCIAL

## 2023-03-22 VITALS
HEIGHT: 62 IN | WEIGHT: 164.44 LBS | BODY MASS INDEX: 30.26 KG/M2 | TEMPERATURE: 98 F | DIASTOLIC BLOOD PRESSURE: 70 MMHG | SYSTOLIC BLOOD PRESSURE: 102 MMHG | HEART RATE: 102 BPM | OXYGEN SATURATION: 98 %

## 2023-03-22 DIAGNOSIS — R30.0 DYSURIA: ICD-10-CM

## 2023-03-22 DIAGNOSIS — F41.9 ANXIETY: ICD-10-CM

## 2023-03-22 DIAGNOSIS — O99.019 ANEMIA DURING PREGNANCY: ICD-10-CM

## 2023-03-22 DIAGNOSIS — F41.8 POSTPARTUM ANXIETY: ICD-10-CM

## 2023-03-22 DIAGNOSIS — Z00.00 ENCOUNTER FOR MEDICAL EXAMINATION TO ESTABLISH CARE: Primary | ICD-10-CM

## 2023-03-22 DIAGNOSIS — M25.50 ARTHRALGIA, UNSPECIFIED JOINT: ICD-10-CM

## 2023-03-22 LAB
BILIRUBIN, UA POC OHS: NEGATIVE
BLOOD, UA POC OHS: NEGATIVE
CLARITY, UA POC OHS: CLEAR
COLOR, UA POC OHS: YELLOW
GLUCOSE, UA POC OHS: NEGATIVE
KETONES, UA POC OHS: NEGATIVE
LEUKOCYTES, UA POC OHS: NEGATIVE
NITRITE, UA POC OHS: NEGATIVE
PH, UA POC OHS: 5.5
PROTEIN, UA POC OHS: NEGATIVE
SPECIFIC GRAVITY, UA POC OHS: 1.02
UROBILINOGEN, UA POC OHS: 0.2

## 2023-03-22 PROCEDURE — 81003 POCT URINALYSIS(INSTRUMENT): ICD-10-PCS | Mod: QW,,, | Performed by: FAMILY MEDICINE

## 2023-03-22 PROCEDURE — 3074F SYST BP LT 130 MM HG: CPT | Mod: ,,, | Performed by: FAMILY MEDICINE

## 2023-03-22 PROCEDURE — 81003 URINALYSIS AUTO W/O SCOPE: CPT | Mod: QW,,, | Performed by: FAMILY MEDICINE

## 2023-03-22 PROCEDURE — 3078F PR MOST RECENT DIASTOLIC BLOOD PRESSURE < 80 MM HG: ICD-10-PCS | Mod: ,,, | Performed by: FAMILY MEDICINE

## 2023-03-22 PROCEDURE — 3008F PR BODY MASS INDEX (BMI) DOCUMENTED: ICD-10-PCS | Mod: ,,, | Performed by: FAMILY MEDICINE

## 2023-03-22 PROCEDURE — 1159F MED LIST DOCD IN RCRD: CPT | Mod: ,,, | Performed by: FAMILY MEDICINE

## 2023-03-22 PROCEDURE — 99214 OFFICE O/P EST MOD 30 MIN: CPT | Mod: ,,, | Performed by: FAMILY MEDICINE

## 2023-03-22 PROCEDURE — 3074F PR MOST RECENT SYSTOLIC BLOOD PRESSURE < 130 MM HG: ICD-10-PCS | Mod: ,,, | Performed by: FAMILY MEDICINE

## 2023-03-22 PROCEDURE — 3008F BODY MASS INDEX DOCD: CPT | Mod: ,,, | Performed by: FAMILY MEDICINE

## 2023-03-22 PROCEDURE — 99214 PR OFFICE/OUTPT VISIT, EST, LEVL IV, 30-39 MIN: ICD-10-PCS | Mod: ,,, | Performed by: FAMILY MEDICINE

## 2023-03-22 PROCEDURE — 1159F PR MEDICATION LIST DOCUMENTED IN MEDICAL RECORD: ICD-10-PCS | Mod: ,,, | Performed by: FAMILY MEDICINE

## 2023-03-22 PROCEDURE — 3078F DIAST BP <80 MM HG: CPT | Mod: ,,, | Performed by: FAMILY MEDICINE

## 2023-03-22 RX ORDER — SERTRALINE HYDROCHLORIDE 50 MG/1
50 TABLET, FILM COATED ORAL DAILY
Qty: 90 TABLET | Refills: 3 | Status: SHIPPED | OUTPATIENT
Start: 2023-03-22 | End: 2023-08-17

## 2023-03-22 NOTE — PROGRESS NOTES
Subjective:       Patient ID: Catie Pendleton is a 33 y.o. female.    Chief Complaint: Establish Care (Pt here to est.care/Pt had a baby 3 months ago currently breast feeding/C/o anxiety would like to change Lexapro/C/o bladder pain,achiness has appt tomorrow with  Uro/gyn)    Ms Pendleton is here to establish care. She just gave birth to a son 3 months ago. She now has a rectal fissure. , bladder spasms, as well as a bladder prolapse. She has a rectocele as well. She also has issues with anxiety. She is breast feeding, so medication choices meed to take that into consideration. She has done her own research and feels like Zoloft would be a good choice for her, which is and excellent choice. She does have an appointment  with uro-gynecology. She also sees a PT for pelvic pain.       Review of Systems   Constitutional:  Positive for fatigue.   HENT:  Negative for congestion, postnasal drip and sinus pain.    Respiratory:  Negative for cough and shortness of breath.    Cardiovascular:  Negative for chest pain.   Gastrointestinal:         Lower abdominal pain, Possibly related to pelvic isssues   Genitourinary:  Positive for pelvic pain.        Chronic pelvic pain,    Musculoskeletal:  Positive for arthralgias and myalgias.        Joint pain, occasional swelling of joints   Neurological:  Negative for dizziness, seizures and light-headedness.   Hematological:  Does not bruise/bleed easily.   Psychiatric/Behavioral:  The patient is not nervous/anxious.      Patient Active Problem List   Diagnosis    Intramural leiomyoma of uterus    Migraine without aura, not intractable, without status migrainosus    Vulvar varices during pregnancy    Lactating mother    COVID-19 affecting pregnancy in first trimester    Exposure to varicella zoster virus (VZV)    Pregnancy    Close exposure to COVID-19 virus    Elevated BP without diagnosis of hypertension     (normal spontaneous vaginal delivery)    Preeclampsia in  postpartum period    Pre-eclampsia in postpartum period    Pelvic pain    Low back pain at multiple sites    Muscle weakness    Diastasis recti       Objective:      Physical Exam  Constitutional:       Appearance: Normal appearance.   HENT:      Head: Normocephalic and atraumatic.      Right Ear: Tympanic membrane normal.      Left Ear: Tympanic membrane normal.      Nose: Nose normal.      Mouth/Throat:      Mouth: Mucous membranes are moist.   Eyes:      Extraocular Movements: Extraocular movements intact.      Pupils: Pupils are equal, round, and reactive to light.   Cardiovascular:      Rate and Rhythm: Tachycardia present.      Pulses: Normal pulses.      Heart sounds: Normal heart sounds.   Pulmonary:      Effort: Pulmonary effort is normal.      Breath sounds: Normal breath sounds.   Abdominal:      General: There is no distension.      Palpations: Abdomen is soft.      Tenderness: There is no abdominal tenderness.   Musculoskeletal:         General: Normal range of motion.   Skin:     General: Skin is warm and dry.   Neurological:      General: No focal deficit present.      Mental Status: She is alert and oriented to person, place, and time.   Psychiatric:         Mood and Affect: Mood normal.         Behavior: Behavior normal.         Thought Content: Thought content normal.       Lab Results   Component Value Date    WBC 8.64 12/31/2022    HGB 12.8 12/31/2022    HCT 40.2 12/31/2022     12/31/2022    ALT 28 12/31/2022    AST 17 12/31/2022     12/31/2022    K 4.2 12/31/2022     (H) 12/31/2022    CREATININE 0.6 12/31/2022    BUN 14 12/31/2022    CO2 20 (L) 12/31/2022    HGBA1C 4.7 09/19/2022     The ASCVD Risk score (Clayton DK, et al., 2019) failed to calculate for the following reasons:    The 2019 ASCVD risk score is only valid for ages 40 to 79  Visit Vitals  /70 (BP Location: Right arm, Patient Position: Sitting, BP Method: Medium (Manual))   Pulse 102   Temp 98.4 °F (36.9 °C)  "  Ht 5' 2" (1.575 m)   Wt 74.6 kg (164 lb 7.4 oz)   SpO2 98%   Breastfeeding Yes   BMI 30.08 kg/m²      Assessment:       1. Encounter for medical examination to establish care    2. Postpartum anxiety    3. Anemia during pregnancy    4. Dysuria    5. Anxiety    6. Arthralgia, unspecified joint          Plan:       1. Encounter for medical examination to establish care  -     Lipid Panel; Future; Expected date: 03/22/2023  -     CBC Auto Differential; Future; Expected date: 03/22/2023  -     Comprehensive Metabolic Panel; Future; Expected date: 03/22/2023  -     Hemoglobin A1C; Future; Expected date: 03/22/2023  -     TSH; Future; Expected date: 03/22/2023    2. Postpartum anxiety    3. Anemia during pregnancy    4. Dysuria  -     POCT Urinalysis(Instrument)    5. Anxiety  -     sertraline (ZOLOFT) 50 MG tablet; Take 1 tablet (50 mg total) by mouth once daily.  Dispense: 90 tablet; Refill: 3  -     TSH; Future; Expected date: 03/22/2023    6. Arthralgia, unspecified joint  -     MARK Screen w/Reflex; Future; Expected date: 03/22/2023  -     C-Reactive Protein; Future; Expected date: 03/22/2023  -     Rheumatoid Factor; Future; Expected date: 03/22/2023  -     Sedimentation rate; Future; Expected date: 03/22/2023  -     Cyclic citrul peptide antibody, IgG; Future; Expected date: 03/22/2023       Follow up in about 6 months (around 9/22/2023), or if symptoms worsen or fail to improve.      Future Appointments       Next 10 Appointments       Date Provider Specialty Appt Notes    3/23/2023  Lab labs    3/23/2023 Evelin Mena MD Urogynecology Prolapsed bladder-*s/ac    3/24/2023 Radha Blackman, PT Outpatient Rehab EST [facility] 1W12 POC ENDS 5/26/23  $30 COPAY    3/31/2023 Radha Blackman PT Outpatient Rehab [facility] 1W12 POC ENDS 5/26/23  $30 COPAY    4/4/2023 Jazlyn Law PTA Outpatient Rehab [facility] 1W12 POC ENDS 5/26/23  $30 COPAY    4/14/2023 Radha Blackman, PT Outpatient Rehab EST [facility] " 1W12 POC ENDS 5/26/23  $30 COPAY    4/21/2023 Radha Blackman, PT Outpatient Rehab [facility] 1W12 POC ENDS 5/26/23  $30 COPAY    4/28/2023 Marii Reeves, PT Outpatient Rehab [facility] 1W12 POC ENDS 5/26/23  $30 COPAY    5/5/2023 Marii Reeves, PT Outpatient Rehab [facility] 1W12 POC ENDS 5/26/23  $30 COPAY    5/12/2023 Marii Reeves, PT Outpatient Rehab [facility] 1W12 POC ENDS 5/26/23  $30 COPAY              Displaying the next 10 appointments. This patient has additional appointments scheduled.

## 2023-03-23 ENCOUNTER — TELEPHONE (OUTPATIENT)
Dept: UROGYNECOLOGY | Facility: CLINIC | Age: 33
End: 2023-03-23

## 2023-03-23 ENCOUNTER — LAB VISIT (OUTPATIENT)
Dept: LAB | Facility: CLINIC | Age: 33
End: 2023-03-23
Payer: COMMERCIAL

## 2023-03-23 ENCOUNTER — OFFICE VISIT (OUTPATIENT)
Dept: UROGYNECOLOGY | Facility: CLINIC | Age: 33
End: 2023-03-23
Payer: COMMERCIAL

## 2023-03-23 VITALS
WEIGHT: 163.81 LBS | DIASTOLIC BLOOD PRESSURE: 72 MMHG | HEART RATE: 100 BPM | SYSTOLIC BLOOD PRESSURE: 110 MMHG | BODY MASS INDEX: 29.96 KG/M2

## 2023-03-23 DIAGNOSIS — M62.08 RECTUS DIASTASIS: ICD-10-CM

## 2023-03-23 DIAGNOSIS — Z00.00 ENCOUNTER FOR MEDICAL EXAMINATION TO ESTABLISH CARE: ICD-10-CM

## 2023-03-23 DIAGNOSIS — N39.3 STRESS INCONTINENCE: ICD-10-CM

## 2023-03-23 DIAGNOSIS — N81.11 CYSTOCELE, MIDLINE: Primary | ICD-10-CM

## 2023-03-23 DIAGNOSIS — M25.50 ARTHRALGIA, UNSPECIFIED JOINT: ICD-10-CM

## 2023-03-23 DIAGNOSIS — N94.9 PAIN OF FEMALE SYMPHYSIS PUBIS: ICD-10-CM

## 2023-03-23 DIAGNOSIS — K60.2 ANAL FISSURE: ICD-10-CM

## 2023-03-23 DIAGNOSIS — M79.18 MYOFASCIAL PAIN: ICD-10-CM

## 2023-03-23 DIAGNOSIS — R03.0 ELEVATED BP WITHOUT DIAGNOSIS OF HYPERTENSION: Primary | ICD-10-CM

## 2023-03-23 DIAGNOSIS — F41.9 ANXIETY: ICD-10-CM

## 2023-03-23 LAB
ALBUMIN SERPL BCP-MCNC: 4 G/DL (ref 3.5–5.2)
ALP SERPL-CCNC: 82 U/L (ref 55–135)
ALT SERPL W/O P-5'-P-CCNC: 27 U/L (ref 10–44)
ANION GAP SERPL CALC-SCNC: 8 MMOL/L (ref 8–16)
AST SERPL-CCNC: 20 U/L (ref 10–40)
BASOPHILS # BLD AUTO: 0.04 K/UL (ref 0–0.2)
BASOPHILS NFR BLD: 0.7 % (ref 0–1.9)
BILIRUB SERPL-MCNC: 1.2 MG/DL (ref 0.1–1)
BUN SERPL-MCNC: 14 MG/DL (ref 6–20)
CALCIUM SERPL-MCNC: 9.1 MG/DL (ref 8.7–10.5)
CHLORIDE SERPL-SCNC: 107 MMOL/L (ref 95–110)
CHOLEST SERPL-MCNC: 219 MG/DL (ref 120–199)
CHOLEST/HDLC SERPL: 4.6 {RATIO} (ref 2–5)
CO2 SERPL-SCNC: 24 MMOL/L (ref 23–29)
CREAT SERPL-MCNC: 0.7 MG/DL (ref 0.5–1.4)
CRP SERPL-MCNC: 1 MG/L (ref 0–8.2)
DIFFERENTIAL METHOD: ABNORMAL
EOSINOPHIL # BLD AUTO: 0.5 K/UL (ref 0–0.5)
EOSINOPHIL NFR BLD: 7.3 % (ref 0–8)
ERYTHROCYTE [DISTWIDTH] IN BLOOD BY AUTOMATED COUNT: 15.8 % (ref 11.5–14.5)
ERYTHROCYTE [SEDIMENTATION RATE] IN BLOOD BY WESTERGREN METHOD: 28 MM/HR (ref 0–20)
EST. GFR  (NO RACE VARIABLE): >60 ML/MIN/1.73 M^2
ESTIMATED AVG GLUCOSE: 97 MG/DL (ref 68–131)
GLUCOSE SERPL-MCNC: 85 MG/DL (ref 70–110)
HBA1C MFR BLD: 5 % (ref 4–5.6)
HCT VFR BLD AUTO: 40.5 % (ref 37–48.5)
HDLC SERPL-MCNC: 48 MG/DL (ref 40–75)
HDLC SERPL: 21.9 % (ref 20–50)
HGB BLD-MCNC: 13 G/DL (ref 12–16)
IMM GRANULOCYTES # BLD AUTO: 0.01 K/UL (ref 0–0.04)
IMM GRANULOCYTES NFR BLD AUTO: 0.2 % (ref 0–0.5)
LDLC SERPL CALC-MCNC: 154.2 MG/DL (ref 63–159)
LYMPHOCYTES # BLD AUTO: 1.4 K/UL (ref 1–4.8)
LYMPHOCYTES NFR BLD: 23.5 % (ref 18–48)
MCH RBC QN AUTO: 27.7 PG (ref 27–31)
MCHC RBC AUTO-ENTMCNC: 32.1 G/DL (ref 32–36)
MCV RBC AUTO: 86 FL (ref 82–98)
MONOCYTES # BLD AUTO: 0.4 K/UL (ref 0.3–1)
MONOCYTES NFR BLD: 6.2 % (ref 4–15)
NEUTROPHILS # BLD AUTO: 3.8 K/UL (ref 1.8–7.7)
NEUTROPHILS NFR BLD: 62.1 % (ref 38–73)
NONHDLC SERPL-MCNC: 171 MG/DL
NRBC BLD-RTO: 0 /100 WBC
PLATELET # BLD AUTO: 214 K/UL (ref 150–450)
PMV BLD AUTO: 11.1 FL (ref 9.2–12.9)
POC RESIDUAL URINE VOLUME: 11 ML (ref 0–100)
POTASSIUM SERPL-SCNC: 4.3 MMOL/L (ref 3.5–5.1)
PROT SERPL-MCNC: 7 G/DL (ref 6–8.4)
RBC # BLD AUTO: 4.7 M/UL (ref 4–5.4)
RHEUMATOID FACT SERPL-ACNC: <13 IU/ML (ref 0–15)
SODIUM SERPL-SCNC: 139 MMOL/L (ref 136–145)
TRIGL SERPL-MCNC: 84 MG/DL (ref 30–150)
TSH SERPL DL<=0.005 MIU/L-ACNC: 0.65 UIU/ML (ref 0.4–4)
WBC # BLD AUTO: 6.13 K/UL (ref 3.9–12.7)

## 2023-03-23 PROCEDURE — 99999 PR PBB SHADOW E&M-EST. PATIENT-LVL III: ICD-10-PCS | Mod: PBBFAC,,, | Performed by: OBSTETRICS & GYNECOLOGY

## 2023-03-23 PROCEDURE — 1159F MED LIST DOCD IN RCRD: CPT | Mod: CPTII,S$GLB,, | Performed by: OBSTETRICS & GYNECOLOGY

## 2023-03-23 PROCEDURE — 99999 PR PBB SHADOW E&M-EST. PATIENT-LVL III: CPT | Mod: PBBFAC,,, | Performed by: OBSTETRICS & GYNECOLOGY

## 2023-03-23 PROCEDURE — 86140 C-REACTIVE PROTEIN: CPT | Performed by: FAMILY MEDICINE

## 2023-03-23 PROCEDURE — 86039 ANTINUCLEAR ANTIBODIES (ANA): CPT | Performed by: FAMILY MEDICINE

## 2023-03-23 PROCEDURE — 3008F BODY MASS INDEX DOCD: CPT | Mod: CPTII,S$GLB,, | Performed by: OBSTETRICS & GYNECOLOGY

## 2023-03-23 PROCEDURE — 3078F DIAST BP <80 MM HG: CPT | Mod: CPTII,S$GLB,, | Performed by: OBSTETRICS & GYNECOLOGY

## 2023-03-23 PROCEDURE — 99215 PR OFFICE/OUTPT VISIT, EST, LEVL V, 40-54 MIN: ICD-10-PCS | Mod: S$GLB,,, | Performed by: OBSTETRICS & GYNECOLOGY

## 2023-03-23 PROCEDURE — 83036 HEMOGLOBIN GLYCOSYLATED A1C: CPT | Performed by: FAMILY MEDICINE

## 2023-03-23 PROCEDURE — 85025 COMPLETE CBC W/AUTO DIFF WBC: CPT | Performed by: FAMILY MEDICINE

## 2023-03-23 PROCEDURE — 1160F PR REVIEW ALL MEDS BY PRESCRIBER/CLIN PHARMACIST DOCUMENTED: ICD-10-PCS | Mod: CPTII,S$GLB,, | Performed by: OBSTETRICS & GYNECOLOGY

## 2023-03-23 PROCEDURE — 86235 NUCLEAR ANTIGEN ANTIBODY: CPT | Mod: 59 | Performed by: FAMILY MEDICINE

## 2023-03-23 PROCEDURE — 86225 DNA ANTIBODY NATIVE: CPT | Performed by: FAMILY MEDICINE

## 2023-03-23 PROCEDURE — 1160F RVW MEDS BY RX/DR IN RCRD: CPT | Mod: CPTII,S$GLB,, | Performed by: OBSTETRICS & GYNECOLOGY

## 2023-03-23 PROCEDURE — 1159F PR MEDICATION LIST DOCUMENTED IN MEDICAL RECORD: ICD-10-PCS | Mod: CPTII,S$GLB,, | Performed by: OBSTETRICS & GYNECOLOGY

## 2023-03-23 PROCEDURE — 51798 US URINE CAPACITY MEASURE: CPT | Mod: S$GLB,,, | Performed by: OBSTETRICS & GYNECOLOGY

## 2023-03-23 PROCEDURE — 3078F PR MOST RECENT DIASTOLIC BLOOD PRESSURE < 80 MM HG: ICD-10-PCS | Mod: CPTII,S$GLB,, | Performed by: OBSTETRICS & GYNECOLOGY

## 2023-03-23 PROCEDURE — 86038 ANTINUCLEAR ANTIBODIES: CPT | Performed by: FAMILY MEDICINE

## 2023-03-23 PROCEDURE — 80061 LIPID PANEL: CPT | Performed by: FAMILY MEDICINE

## 2023-03-23 PROCEDURE — 3074F SYST BP LT 130 MM HG: CPT | Mod: CPTII,S$GLB,, | Performed by: OBSTETRICS & GYNECOLOGY

## 2023-03-23 PROCEDURE — 84443 ASSAY THYROID STIM HORMONE: CPT | Performed by: FAMILY MEDICINE

## 2023-03-23 PROCEDURE — 3008F PR BODY MASS INDEX (BMI) DOCUMENTED: ICD-10-PCS | Mod: CPTII,S$GLB,, | Performed by: OBSTETRICS & GYNECOLOGY

## 2023-03-23 PROCEDURE — 85651 RBC SED RATE NONAUTOMATED: CPT | Performed by: FAMILY MEDICINE

## 2023-03-23 PROCEDURE — 86431 RHEUMATOID FACTOR QUANT: CPT | Performed by: FAMILY MEDICINE

## 2023-03-23 PROCEDURE — 3074F PR MOST RECENT SYSTOLIC BLOOD PRESSURE < 130 MM HG: ICD-10-PCS | Mod: CPTII,S$GLB,, | Performed by: OBSTETRICS & GYNECOLOGY

## 2023-03-23 PROCEDURE — 99215 OFFICE O/P EST HI 40 MIN: CPT | Mod: S$GLB,,, | Performed by: OBSTETRICS & GYNECOLOGY

## 2023-03-23 PROCEDURE — 86200 CCP ANTIBODY: CPT | Performed by: FAMILY MEDICINE

## 2023-03-23 PROCEDURE — 80053 COMPREHEN METABOLIC PANEL: CPT | Performed by: FAMILY MEDICINE

## 2023-03-23 PROCEDURE — 51798 POCT BLADDER SCAN: ICD-10-PCS | Mod: S$GLB,,, | Performed by: OBSTETRICS & GYNECOLOGY

## 2023-03-23 NOTE — PROGRESS NOTES
Chief Complaint   Patient presents with    Vaginal Prolapse     Bladder prolaspe          HPI: Patient is a 33 y.o. female  who presents today with c/o vaginal prolapse. She states that she is currently in PFPT for pelvic pain  and lower abdominal pain. She has symphysis pubis pain and a rectus diastasis.  Patient had an unassisted  on 23. She had a minor vaginal tear that was repaired. States that she had a larger tear with her other two children. Patient's pregnancy was complicated by postpartum pre-eclampsia. Patient reports that this was her third delivery and he was the smallest baby she delivered and the fastest.   She reports that symptoms of symphysis pubis pain started in pregnancy at about 18 weeks. Noticed difficulty/ pain with turning over in bed and being on her feet for a prolonged period of time. She would wear a brace and found that it helped. Has not tried the brace since her delivery. She states that she has had two anal fissures since her son was born. She has one with her last delivery. She reports some difficulty with constipation intermittently. She is trying to take fiber, water, and stool softeners.   She states that during pregnancy she had vaginal pressure and varicosities. Now that she is postpartum symptoms are better, both the symphysis pubis pain as well as the heaviness and varicosities. She is not overly aware of a vaginal bulge but reports recently looking with a mirror and her anatomy is different but cannot say that this is new since she never looked previously after her other two births.   She denies urinary urgency, frequency, nocturia. She has noticed a little post void dribbling and leakage with a sneeze about 2-3 times since the birth of her son.     Review of Systems   Constitutional:  Negative for activity change, appetite change, chills, fatigue, fever and unexpected weight change.   HENT:  Negative for nasal congestion, dental problem, hearing loss, mouth  dryness and sore throat.    Eyes:  Positive for eye dryness. Negative for pain.   Respiratory:  Negative for cough, shortness of breath and wheezing.    Cardiovascular:  Negative for chest pain, palpitations and leg swelling.   Gastrointestinal:  Positive for abdominal pain and rectal pain. Negative for abdominal distention, blood in stool and constipation.   Endocrine: Negative for cold intolerance and heat intolerance.   Genitourinary:  Negative for dyspareunia, hot flashes and vaginal dryness.   Musculoskeletal:  Positive for arthralgias. Negative for back pain, gait problem, joint swelling, myalgias, neck pain and neck stiffness.   Integumentary:  Negative for rash.   Neurological:  Negative for dizziness, tremors, seizures, speech difficulty, weakness, light-headedness, numbness and headaches.   Psychiatric/Behavioral:  Negative for confusion, dysphoric mood and sleep disturbance. The patient is not nervous/anxious.       Past Medical History:   Diagnosis Date    Anal fissure 12/09/2020    Given suppositories (anusol to start)--f/u at 6week visit. 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    Hypertension     Postpartum    Migraine        Past Surgical History:   Procedure Laterality Date    OVARIAN CYST REMOVAL Right 2014         Current Outpatient Medications:     sertraline (ZOLOFT) 50 MG tablet, Take 1 tablet (50 mg total) by mouth once daily., Disp: 90 tablet, Rfl: 3    Review of patient's allergies indicates:   Allergen Reactions    Clindamycin Itching and Rash    Morphine Rash    Penicillins Hives and Rash     She was a baby and was told that she was allergic.    Sulfa (sulfonamide antibiotics) Rash       Family History   Problem Relation Age of Onset    Colon polyps Mother         Precancerous - Were removed    Hypertension Mother     Hyperlipidemia Mother     Patel's esophagus Mother     Hypertension Father     Hyperlipidemia  Father     Diabetes Father     Melanoma Father     Lung cancer Maternal Grandfather     Diabetes Paternal Grandmother     Emphysema Paternal Grandfather         Was a smoker    Skin cancer Paternal Grandfather         Not sure which kind of skin cancer    Breast cancer Paternal Aunt 67    Ovarian cancer Neg Hx        Social History     Socioeconomic History    Marital status:      Spouse name: Duy   Tobacco Use    Smoking status: Never    Smokeless tobacco: Never   Substance and Sexual Activity    Alcohol use: Not Currently    Drug use: Never    Sexual activity: Yes     Partners: Male     Birth control/protection: Condom     Comment: Duy   Social History Narrative    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 CLAU Gordillo works at [employer].        This will be Catie and Duy's third baby together!        No cats at home.     Social Determinants of Health     Financial Resource Strain: Low Risk     Difficulty of Paying Living Expenses: Not hard at all   Food Insecurity: No Food Insecurity    Worried About Running Out of Food in the Last Year: Never true    Ran Out of Food in the Last Year: Never true   Transportation Needs: No Transportation Needs    Lack of Transportation (Medical): No    Lack of Transportation (Non-Medical): No   Physical Activity: Inactive    Days of Exercise per Week: 0 days    Minutes of Exercise per Session: 0 min   Stress: No Stress Concern Present    Feeling of Stress : Only a little   Social Connections: Unknown    Frequency of Communication with Friends and Family: Twice a week    Frequency of Social Gatherings with Friends and Family: Once a week    Active Member of Clubs or Organizations: No    Attends Club or Organization Meetings: Never    Marital Status:    Housing Stability: Low Risk     Unable to Pay for Housing in the Last Year: No    Number of Places Lived in the Last Year: 1    Unstable Housing in the Last Year: No       OB History           4    Para   3    Term   3            AB   1    Living   3         SAB   1    IAB        Ectopic        Multiple   0    Live Births   3                 Gyn History    Mammogram: n/a  Pap smear: 22 negative  LMP: No LMP recorded.   Postmenopausal bleeding: n/a      INITIAL PHYSICAL EXAMINATION    Vitals:    23 1116   BP: 110/72   Pulse: 100      General: Healthy in appearance, Well nourished, Affect Normal, NAD.  Neck: Supple, No masses, Trachea midline, Thyroid normal size,  non-tender, no masses or nodules.  Nodes: No clavicular/cervical adenopathy.  Skin: Normal temperature, No atypical lesions or rash.  Heart: Normal sounds, no murmurs  Lungs: Normal respiratory effort.  Gastrointestinal: Non tender, Non distended, No masses, guarding or  rebound, No hepatosplenomegally, No hernia. 2 fingerbreadth diastasis noted infraumbilical.  Ext: No clubbing, cyanosis, edema or varicosities.  DTR's: 2+ bilaterally  Strength 5/5 bilateral upper and lower extremities    Genitourinary-  Vulva: normal without lesions, masses, atrophy or pain  Urethra: meatus central and normal in appearance, no prolapse/caruncle, no masses or discharge. Empty supine stress test was negative.  Bladder: non-tender, no masses  Vagina: No discharge or lesions, mild atrophy, no masses appreciated.  [See POP-Q]  Cervix: no lesions, no discharge  Uterus:  non-tender, anteverted, approximately 6 weeks size  Adnexa: no masses, non tender.  Rectal: deferred, small anterior fissure noted   Neuro: S2-4- pin prick and light touch intact, Anal wink present  Levator strength: 1/5  Levator tenderness: left 3/10 and right 3/10    POP-Q Exam- pelvic organ prolapse quantitative    Aa   Anterior Wall -1 Ba   Anterior wall -1 C   Cervix or cuff -5.5   Gh   Genital hiatus 4.5  pb   perineal body 3 tvl   Total vaginal length 9   Ap   Posterior wall -2.5 Bp   Posterior wall -2.5 D   Posterior fornix -8.5     with Uterus    POP-Q  Stage:2      Telephone on 2023   Component Date Value Ref Range Status    POC Residual Urine Volume 2023 11  0 - 100 mL Final        ASSESSMENT & PLAN:    Cystocele, midline    Stress incontinence    Anal fissure    Myofascial pain    Rectus diastasis    Pain of female symphysis pubis       32 yo  approximately 13 weeks postpartum presents today with pelvic floor dysfunction and a stage 2 anterior vaginal prolapse.   Exam findings of rectus diastasis, anterior vaginal prolapse and small anal fissure were discussed. Reviewed that much of the pelvic floor dysfunction will improve with physical therapy. Also discussed that the literature indicates that PT can help with prolapse. Reviewed that there will also be improvement over time with normalization of her menstrual cycles.   Advised that patient take her brace to the PT to show her what has helped in the past. It may be that if the PT finds it acceptable as they work on her core stabilization she can use it at home to help with some of her symptoms.   She has some mild to moderate myofascial pain which we reviewed PT will help and once they can work on strengthening of those muscles her stress urinary incontinence should improve as well although symptoms are minor at this time.   For the anal fissure discussed that it will be exacerbated by constipation and rapid transit of stool through the anus. Discussed maintaining a high fiber diet. If symptoms do not improve then will refer to colorectal surgery.     All questions were answered today. The patient was encouraged to contact the office as needed with any additional questions or concerns. She will return for a follow up visit in 4 months.     Total time spent on visit was 50 minutes.  This includes face to face time and non-face to face time preparing to see the patient (eg, review of tests), Obtaining and/or reviewing separately obtained history, Documenting clinical information in the  electronic or other health record, Independently interpreting resultsand communicating results to the patient/family/caregiver, or Care coordination.    Evelin Mena MD

## 2023-03-24 ENCOUNTER — CLINICAL SUPPORT (OUTPATIENT)
Dept: REHABILITATION | Facility: HOSPITAL | Age: 33
End: 2023-03-24
Payer: COMMERCIAL

## 2023-03-24 DIAGNOSIS — M62.08 DIASTASIS RECTI: ICD-10-CM

## 2023-03-24 DIAGNOSIS — R10.2 PELVIC PAIN: Primary | ICD-10-CM

## 2023-03-24 DIAGNOSIS — M62.81 MUSCLE WEAKNESS: ICD-10-CM

## 2023-03-24 DIAGNOSIS — M54.50 LOW BACK PAIN AT MULTIPLE SITES: ICD-10-CM

## 2023-03-24 LAB
ANA PATTERN 1: NORMAL
ANA SER QL IF: POSITIVE
ANA TITR SER IF: NORMAL {TITER}
CCP AB SER IA-ACNC: <0.5 U/ML

## 2023-03-24 PROCEDURE — 97112 NEUROMUSCULAR REEDUCATION: CPT | Mod: PN

## 2023-03-24 PROCEDURE — 97110 THERAPEUTIC EXERCISES: CPT | Mod: PN

## 2023-03-24 PROCEDURE — 97140 MANUAL THERAPY 1/> REGIONS: CPT | Mod: PN

## 2023-03-24 NOTE — PROGRESS NOTES
Outpatient Pelvic Health Physical Therapy Daily Note       Patient Name: Catie Pendleton  Clinic Number: [id number]    Therapy Diagnosis:   Encounter Diagnoses   Name Primary?    Diastasis recti     Muscle weakness     Low back pain at multiple sites     Pelvic pain Yes       Physician: Catie Maynard CNM    Visit Date: 3/24/2023    Physician Orders: PT Eval and Treat   Medical Diagnosis from Referral: Postpartum care and examination [Z39.2], Pelvic pain [R10.2]  Evaluation Date: 3/3/2023  Authorization Period Expiration: 01/01/2023 - 12/31/2023  Plan of Care Expiration: 05/26/2023  Visit # / Visits authorized: 2/ 6; Total - 3    Time In: 08:05am  Time Out: 09:04am  Total Billable Time: 59 minutes    Precautions: Grade 2-3 cystocele    SUBJECTIVE   Pt reports: she has visited a urogynecologist who confirmed the prolapse but has recommended she continue with therapy to see if it will reduce. She walking for 30 minutes at a pace of 2.7 miles on the treadmill recently. Patient agrees to participate with skilled PT services at this time.      She was partially compliant with home exercise program.  Response to previous treatment: no increase in symptoms  Functional change: tolerated walking on treadmill    Pain: 0/10   Pain location: Pubis symphysis    OBJECTIVE     VAGINAL PELVIC FLOOR EXAM    EXTERNAL ASSESSMENT  Introitus: gaping  Skin condition: redness noted  Scarring: Unable to view perineal scar   Sensation: WNL   Pain:  none  Voluntary contraction: visible lift  Voluntary relaxation: bulge  Involuntary contraction: prolapse  Bearing down: prolapse  Perineal descent: present      INTERNAL ASSESSMENT  Pain: trigger points as follows: Left levator ani, bulbospongiosus and obturator internus   Sensation: able to localized pressure appropriately   Vaginal vault: roomy   Muscle Bulk: hypertonus at left side  Muscle Power: 4-/5  Muscle Endurance: 8 sec  # Reps To Fatigue: 5    Fast Contractions in 10 seconds: 6      Quality of contraction: slow rise and decreased hold   Specificity: patient contracts: gluteal musculature   Coordination: tends to hold breath during PFM contration and cannot isolate PFM from abdominals   Prolapse check:Grade 2-3 cystocele and Grade 2 rectocele (cystocele assessed in supine and standing)    TREATMENT       Therapeutic Exercise to develop  strength, endurance, ROM, flexibility, posture, and core stabilization for 22 minutes including:     -Recumbent stepper level 3 [id number]  -Cat/camel 2x10  -Child's pose   -Thoracic extension [id number]   -Serratus anterior punchs 2x10, 2lbs    Neuromuscular Re-education to develop coordination, awareness, posture, stabilization and kinesthetic sense for 24 minutes including:    -Birddog, small range 5-second holds, 2x10   -Lower trap activation for posture correction  -Abdominal bracing with heel slide for stabilization 2x10  -360 breathing in supine    Not today:  Pelvic floor coordination training - inhale/relax, exhale/contract, bearing down  Pelvic bracing (kegel, transverse abdominis, posterior pelvic tilt)  Diaphragmatic breathing    Manual Therapy to develop flexibility, extensibility, and desensitization for 10 minutes including:    Trigger point release/myofascial mobilization B obliques (L>R)  Muscle energy technique for R innominate rotation    Not today:  Trigger point release/myofascial mobilization to left levator ani & bulbospongiosus    Therapeutic Activity to improve understanding, knowledge of condition, bowel/bladder health, functional static and dynamic mobility for 00 minutes including education as described below:    Vaginal pelvic floor examination  Patient education for pressure management strategies, cystocele and rectocele precautions, proper toileting mechanics with use of squatty potty      Home exercise program: Posterior pelvic tilt, transverse abdominis activation, hip adductor isometrics, cat/camel, bird-dog, thoracic extension,  child's pose, serratus anterior punches    Education: Discussed progression of plan of care with patient; educated pt in activity modification; reviewed HEP with pt. Pt demonstrated and verbalized understanding of all instruction and was provided with a handout of HEP (see Patient Instructions).      ASSESSMENT        Patient presents with thoracic spine hypomobility, posture deficits, core and gross hip weakness, lack of coordination and decreased awareness. Today's session focused on said deficits - patient requires frequent tactile and verbal cues. Home exercise program updated to improve carryover. Patient tolerated entire treatment well with no visible adverse effects.     Will the patient continue to benefit from skilled PT intervention? Yes    Progress towards goals:  good    Goals:  Short Term Goals: 2 weeks   Patient to be 100% independent with home exercise program to facilitate optimal recovery.  Patient to be able to isolate transverse abdominis muscle and prevent rectus abdominis doming during transitional movements.  Patient to be educated in pressure management strategies to prevent hernia development.  Trigger points at left external oblique to resolve 100% to allow full excursion diaphragmatic breathing and optimal posture needed for functional, dynamic movement.  Patient to be educated in proper ergonomics and breastfeeding posture to prevent exacerbation of symptoms.     Long Term Goals: 12 weeks   Patient to be able to tolerate a 1-hour car ride to be able to attend work and work-related meetings/training.  Patient to tolerate 2-mile walk without increased pelvic/low back pain for improved cardiovascular health.  Core strength to improve by 1/2 muscle grade to improve stabilization during transitional movements and with lifting 2-year-old toddler.  Patient to report 0/10 pelvic pain with IADLs and all forms of exercise for improved overall health.    PLAN     Continue with established Plan of  Care, working toward established PT goals.      Plan of care Certification: 3/3/2023 to 05/26/2023.     Outpatient Physical Therapy 1 times weekly for 12 weeks to include the following interventions: therapeutic exercises, therapeutic activity, neuromuscular re-education, manual therapy, modalities PRN, patient/family education, dry needling, and self care/home management. Skilled therapy intervention addressing the evaluative findings and impairments noted above may include: Patient related information education regarding bladder, bowel, and pelvic floor anatomy and function; bladder and bowel health education to include bladder, bowel and food diary as needed. Incorporation of bowel and bladder health and dietary recommendations; instructions in bowel management to include diet, fluids, habit training, and use of supplemental fiber; activities of daily living training to include toileting position modifications and strategies for voiding and defecation. Manual therapy as indicated to include myofascial release/mobilization, connective tissue manipulation, soft tissue mobilization, trigger point release, visceral mobilization, joint mobilization, and muscle energy techniques - techniques to be performed externally and internally with vaginal and/or rectal approach as necessary to address abdomino-pelvic musculoskeletal dysfunction contributing to diagnosis. Therapeutic exercise instruction for pelvic floor muscle strength and relaxation, core strengthening and trunk stabilization with extremity disassociation, hip flexibility, maintenance of neutral pelvic positioning in sitting, standing and lying down, and with transitional movements. Neuromuscular reeducation for pelvic floor muscle awareness and relaxation response training. EMG and/or biofeedback for pelvic floor musculature training with external and/or internal vaginal or rectal electrode/pressure sensors as indicated. Rectal balloon sensory assessment and  retraining. Instruction and review of stress reduction/autonomic nervous system quieting strategies stresses to address sympathetic nervous system response and encourage parasympathetic nervous response to address constipation (symptoms). Incorporation of appropriate treatment strategies into an independent home exercise program.      Radha Blackman, PT

## 2023-03-26 ENCOUNTER — PATIENT MESSAGE (OUTPATIENT)
Dept: FAMILY MEDICINE | Facility: CLINIC | Age: 33
End: 2023-03-26
Payer: COMMERCIAL

## 2023-03-26 DIAGNOSIS — K59.00 CONSTIPATION, UNSPECIFIED CONSTIPATION TYPE: ICD-10-CM

## 2023-03-26 DIAGNOSIS — M25.50 ARTHRALGIA, UNSPECIFIED JOINT: Primary | ICD-10-CM

## 2023-03-26 DIAGNOSIS — R76.8 ELEVATED ANTINUCLEAR ANTIBODY (ANA) LEVEL: ICD-10-CM

## 2023-03-27 LAB
ANTI SM ANTIBODY: 0.14 RATIO (ref 0–0.99)
ANTI SM/RNP ANTIBODY: 0.19 RATIO (ref 0–0.99)
ANTI-SM INTERPRETATION: NEGATIVE
ANTI-SM/RNP INTERPRETATION: NEGATIVE
ANTI-SSA ANTIBODY: 0.09 RATIO (ref 0–0.99)
ANTI-SSA INTERPRETATION: NEGATIVE
ANTI-SSB ANTIBODY: 0.08 RATIO (ref 0–0.99)
ANTI-SSB INTERPRETATION: NEGATIVE
DSDNA AB SER-ACNC: NORMAL [IU]/ML

## 2023-03-27 RX ORDER — DOCUSATE SODIUM 100 MG/1
100 CAPSULE, LIQUID FILLED ORAL 2 TIMES DAILY
Qty: 60 CAPSULE | Refills: 5 | Status: SHIPPED | OUTPATIENT
Start: 2023-03-27

## 2023-03-31 ENCOUNTER — TELEPHONE (OUTPATIENT)
Dept: FAMILY MEDICINE | Facility: CLINIC | Age: 33
End: 2023-03-31
Payer: COMMERCIAL

## 2023-03-31 ENCOUNTER — CLINICAL SUPPORT (OUTPATIENT)
Dept: REHABILITATION | Facility: HOSPITAL | Age: 33
End: 2023-03-31
Payer: COMMERCIAL

## 2023-03-31 DIAGNOSIS — M62.81 MUSCLE WEAKNESS: ICD-10-CM

## 2023-03-31 DIAGNOSIS — R10.2 PELVIC PAIN: Primary | ICD-10-CM

## 2023-03-31 DIAGNOSIS — M62.08 DIASTASIS RECTI: ICD-10-CM

## 2023-03-31 DIAGNOSIS — M54.50 LOW BACK PAIN AT MULTIPLE SITES: ICD-10-CM

## 2023-03-31 PROCEDURE — 97530 THERAPEUTIC ACTIVITIES: CPT | Mod: PN

## 2023-03-31 PROCEDURE — 97112 NEUROMUSCULAR REEDUCATION: CPT | Mod: PN

## 2023-03-31 PROCEDURE — 97110 THERAPEUTIC EXERCISES: CPT | Mod: PN

## 2023-03-31 NOTE — PROGRESS NOTES
"  Outpatient Pelvic Health Physical Therapy Progress Note       Patient Name: Catie Pendleton  Clinic Number: [id number]    Therapy Diagnosis:   Encounter Diagnoses   Name Primary?    Pelvic pain Yes    Muscle weakness     Diastasis recti     Low back pain at multiple sites          Physician: Catie Maynard CNM    Visit Date: 3/31/2023    Physician Orders: PT Eval and Treat   Medical Diagnosis from Referral: Postpartum care and examination [Z39.2], Pelvic pain [R10.2]  Evaluation Date: 3/3/2023  Authorization Period Expiration: 01/01/2023 - 12/31/2023  Plan of Care Expiration: 05/26/2023  Visit # / Visits authorized: 3/ 6; Total - 4    Time In: 08:15am  Time Out: 09:10am  Total Billable Time: 55 minutes    Precautions: Grade 2-3 cystocele    SUBJECTIVE   Pt reports: 50% overall improvement. Patient states she hasn't had "that really intense hip and suprapubic pain" lately - it's not as noticeable at all. Catie states her lab values have come back abnormal and she is waiting for further information from physician. Patient agrees to participate with skilled PT services at this time.      She was partially compliant with home exercise program.  Response to previous treatment: no increase in symptoms  Functional change: tolerated walking on treadmill    Pain: 2/10 at worst but 0/10 currently  Pain location: Pubis symphysis    OBJECTIVE     VAGINAL PELVIC FLOOR EXAM    EXTERNAL ASSESSMENT  Introitus: gaping  Skin condition: redness noted  Scarring: Unable to view perineal scar   Sensation: WNL   Pain:  none  Voluntary contraction: visible lift  Voluntary relaxation: bulge  Involuntary contraction: prolapse  Bearing down: prolapse  Perineal descent: present      INTERNAL ASSESSMENT  Pain: trigger points as follows: Left levator ani, bulbospongiosus and obturator internus   Sensation: able to localized pressure appropriately   Vaginal vault: roomy   Muscle Bulk: hypertonus at left side  Muscle Power: 4-/5  Muscle " Endurance: 8 sec  # Reps To Fatigue: 5    Fast Contractions in 10 seconds: 6     Quality of contraction: slow rise and decreased hold   Specificity: patient contracts: gluteal musculature   Coordination: tends to hold breath during PFM contration and cannot isolate PFM from abdominals   Prolapse check:Grade 2-3 cystocele and Grade 2 rectocele (cystocele assessed in supine and standing)    TREATMENT       Therapeutic Exercise to develop  strength, endurance, ROM, flexibility, posture, and core stabilization for 28 minutes including:     -Recumbent stepper level 3 [id number]  -Y's (kneeling on foam) 5lbs, 2x10  -Deadlift 8lbs bar, 2x10  -Supine clam with green theraband 3x10  -Thoracic extension [id number]     Not today:  -Serratus anterior punches 2x10, 2lbs    Neuromuscular Re-education to develop coordination, awareness, posture, stabilization and kinesthetic sense for 15 minutes including:    -Bridge with yoga block between knees, 3x10  -STS with TA activation, emphasis on breath control for pressure management, 3x10  -Pallof press(green theraband)  with lateral walk-outs 4steps each way, 6 laps each way     Not today:  -Birddog, small range 5-second holds, 2x10   -Lower trap activation for posture correction  -Abdominal bracing with heel slide for stabilization 2x10  -360 breathing in supine  -Pelvic floor coordination training - inhale/relax, exhale/contract, bearing down  -Pelvic bracing (kegel, transverse abdominis, posterior pelvic tilt)  -Diaphragmatic breathing    Manual Therapy to develop flexibility, extensibility, and desensitization for 00 minutes including:    Trigger point release/myofascial mobilization B obliques (L>R)  Muscle energy technique for R innominate rotation  Trigger point release/myofascial mobilization to left levator ani & bulbospongiosus    Therapeutic Activity to improve understanding, knowledge of condition, bowel/bladder health, functional static and dynamic mobility for 10 minutes  including education as described below:    Home exercise program review (patient's request)    Not today:  Vaginal pelvic floor examination  Patient education for pressure management strategies, cystocele and rectocele precautions, proper toileting mechanics with use of squatty potty    Home exercise program: Posterior pelvic tilt, transverse abdominis activation, hip adductor isometrics, cat/camel, bird-dog, thoracic extension, child's pose, serratus anterior punches, abd brace heel slides    Education: Discussed progression of plan of care with patient; educated pt in activity modification; reviewed HEP with pt. Patient demonstrated and verbalized understanding of all instruction and was provided with a handout of HEP (see Patient Instructions).      ASSESSMENT      Since start of care, pelvic pain has reduced significantly and patient tolerates IADLs, caring for child and household chores with less difficulty. Despite improvements, patient continues to present with thoracic spine hypomobility, posture deficits, core and gross hip weakness, lack of coordination and decreased awareness. Patient tolerated entire treatment well with no visible adverse effects.     Will the patient continue to benefit from skilled PT intervention? Yes    Necessity for continued treatment: Continued pain and difficulty with household chores, IADLs and caring for infant.  Progress towards goals:  good    Goals:  Short Term Goals: 2 weeks   Patient to be 100% independent with home exercise program to facilitate optimal recovery. (PROGRESSING)  Patient to be able to isolate transverse abdominis muscle and prevent rectus abdominis doming during transitional movements. (ACHIEVED)  Patient to be educated in pressure management strategies to prevent hernia development. (ACHIEVED)  Trigger points at left external oblique to resolve 100% to allow full excursion diaphragmatic breathing and optimal posture needed for functional, dynamic movement.  (PROGRESSING)  Patient to be educated in proper ergonomics and breastfeeding posture to prevent exacerbation of symptoms. (ACHIEVED)     Long Term Goals: 12 weeks   Patient to be able to tolerate a 1-hour car ride to be able to attend work and work-related meetings/training. (PROGRESSING)  Patient to tolerate 2-mile walk without increased pelvic/low back pain for improved cardiovascular health. (PROGRESSING)  Core strength to improve by 1/2 muscle grade to improve stabilization during transitional movements and with lifting 2-year-old toddler. (PROGRESSING)  Patient to report 0/10 pelvic pain with IADLs and all forms of exercise for improved overall health. (PROGRESSING)    PLAN     Continue with established Plan of Care, working toward established PT goals.      Plan of care Certification: 3/3/2023 to 05/26/2023.     Outpatient Physical Therapy 1 times weekly for 12 weeks to include the following interventions: therapeutic exercises, therapeutic activity, neuromuscular re-education, manual therapy, modalities PRN, patient/family education, dry needling, and self care/home management. Skilled therapy intervention addressing the evaluative findings and impairments noted above may include: Patient related information education regarding bladder, bowel, and pelvic floor anatomy and function; bladder and bowel health education to include bladder, bowel and food diary as needed. Incorporation of bowel and bladder health and dietary recommendations; instructions in bowel management to include diet, fluids, habit training, and use of supplemental fiber; activities of daily living training to include toileting position modifications and strategies for voiding and defecation. Manual therapy as indicated to include myofascial release/mobilization, connective tissue manipulation, soft tissue mobilization, trigger point release, visceral mobilization, joint mobilization, and muscle energy techniques - techniques to be performed  externally and internally with vaginal and/or rectal approach as necessary to address abdomino-pelvic musculoskeletal dysfunction contributing to diagnosis. Therapeutic exercise instruction for pelvic floor muscle strength and relaxation, core strengthening and trunk stabilization with extremity disassociation, hip flexibility, maintenance of neutral pelvic positioning in sitting, standing and lying down, and with transitional movements. Neuromuscular reeducation for pelvic floor muscle awareness and relaxation response training. EMG and/or biofeedback for pelvic floor musculature training with external and/or internal vaginal or rectal electrode/pressure sensors as indicated. Rectal balloon sensory assessment and retraining. Instruction and review of stress reduction/autonomic nervous system quieting strategies stresses to address sympathetic nervous system response and encourage parasympathetic nervous response to address constipation (symptoms). Incorporation of appropriate treatment strategies into an independent home exercise program.      Radha Blackman, PT

## 2023-04-03 ENCOUNTER — PATIENT MESSAGE (OUTPATIENT)
Dept: RHEUMATOLOGY | Facility: CLINIC | Age: 33
End: 2023-04-03
Payer: COMMERCIAL

## 2023-04-03 ENCOUNTER — OCCUPATIONAL HEALTH (OUTPATIENT)
Dept: URGENT CARE | Facility: CLINIC | Age: 33
End: 2023-04-03

## 2023-04-03 ENCOUNTER — TELEPHONE (OUTPATIENT)
Dept: RHEUMATOLOGY | Facility: CLINIC | Age: 33
End: 2023-04-03
Payer: COMMERCIAL

## 2023-04-03 ENCOUNTER — TELEPHONE (OUTPATIENT)
Dept: FAMILY MEDICINE | Facility: CLINIC | Age: 33
End: 2023-04-03
Payer: COMMERCIAL

## 2023-04-03 PROCEDURE — 80305 PR COLLECTION ONLY DRUG SCREEN: ICD-10-PCS | Mod: S$GLB,,, | Performed by: EMERGENCY MEDICINE

## 2023-04-03 PROCEDURE — 80305 DRUG TEST PRSMV DIR OPT OBS: CPT | Mod: S$GLB,,, | Performed by: EMERGENCY MEDICINE

## 2023-04-03 NOTE — TELEPHONE ENCOUNTER
Called Brittani Swann MD office [phone], Rheumatology, spoke with Tiffanie, she sent message to MD to see if she could get this patient scheduled

## 2023-04-03 NOTE — TELEPHONE ENCOUNTER
----- Message from Alexa Quintana sent at 4/3/2023 12:40 PM CDT -----  Regarding: returning call  Contact: patient  Type:  Patient Returning Call    Who Called:  patient   Who Left Message for Patient:  notsure  Does the patient know what this is regarding?:  no  Best Call Back Number:  [phone] (home)

## 2023-04-03 NOTE — TELEPHONE ENCOUNTER
----- Message from Tiffanie Chauashley sent at 4/3/2023 11:42 AM CDT -----  Contact: pt at [phone]  Type:  Sooner Appointment Request    Caller is requesting a sooner appointment.      Name of Caller:  pt  When is the first available appointment?  none    Best Call Back Number:  [phone]    Additional Information:  calling to schedule an appt. Please call and advise. Thank you    Patient sent response through portal with available choices for rheumatology. RACQUEL

## 2023-04-03 NOTE — TELEPHONE ENCOUNTER
Left voice mail, that I had called rheumatology and they have sent message to MD to get a sooner appointment

## 2023-04-04 ENCOUNTER — DOCUMENTATION ONLY (OUTPATIENT)
Dept: REHABILITATION | Facility: HOSPITAL | Age: 33
End: 2023-04-04
Payer: COMMERCIAL

## 2023-04-04 ENCOUNTER — CLINICAL SUPPORT (OUTPATIENT)
Dept: REHABILITATION | Facility: HOSPITAL | Age: 33
End: 2023-04-04
Payer: COMMERCIAL

## 2023-04-04 DIAGNOSIS — M62.81 MUSCLE WEAKNESS: ICD-10-CM

## 2023-04-04 DIAGNOSIS — R10.2 PELVIC PAIN: Primary | ICD-10-CM

## 2023-04-04 DIAGNOSIS — M62.08 DIASTASIS RECTI: ICD-10-CM

## 2023-04-04 PROCEDURE — 97110 THERAPEUTIC EXERCISES: CPT | Mod: PN,CQ

## 2023-04-04 PROCEDURE — 97112 NEUROMUSCULAR REEDUCATION: CPT | Mod: PN,CQ

## 2023-04-04 NOTE — PROGRESS NOTES
Outpatient Pelvic Health Physical Therapy Progress Note       Patient Name: Catie Pendleton  Clinic Number: [id number]    Therapy Diagnosis:   Encounter Diagnoses   Name Primary?    Pelvic pain Yes    Muscle weakness     Diastasis recti        Physician: Catie Maynard CNM    Visit Date: 4/4/2023    Physician Orders: PT Eval and Treat   Medical Diagnosis from Referral: Postpartum care and examination [Z39.2], Pelvic pain [R10.2]  Evaluation Date: 3/3/2023  Authorization Period Expiration: 01/01/2023 - 12/31/2023  Plan of Care Expiration: 05/26/2023  Visit # / Visits authorized: 4/ 6; Total - 5    Time In: 09:30 am  Time Out: 10:25 am  Total Billable Time: 55 minutes    Precautions: Grade 2-3 cystocele    SUBJECTIVE   Pt reports: general improvement since starting therapy.     She was partially compliant with home exercise program.  Response to previous treatment: no increase in symptoms  Functional change: tolerated walking on treadmill    Pain: 2/10 at worst but 0/10 currently  Pain location: Pubis symphysis    OBJECTIVE     VAGINAL PELVIC FLOOR EXAM    EXTERNAL ASSESSMENT  Introitus: gaping  Skin condition: redness noted  Scarring: Unable to view perineal scar   Sensation: WNL   Pain:  none  Voluntary contraction: visible lift  Voluntary relaxation: bulge  Involuntary contraction: prolapse  Bearing down: prolapse  Perineal descent: present      INTERNAL ASSESSMENT  Pain: trigger points as follows: Left levator ani, bulbospongiosus and obturator internus   Sensation: able to localized pressure appropriately   Vaginal vault: roomy   Muscle Bulk: hypertonus at left side  Muscle Power: 4-/5  Muscle Endurance: 8 sec  # Reps To Fatigue: 5    Fast Contractions in 10 seconds: 6     Quality of contraction: slow rise and decreased hold   Specificity: patient contracts: gluteal musculature   Coordination: tends to hold breath during PFM contration and cannot isolate PFM from abdominals   Prolapse check:Grade 2-3  cystocele and Grade 2 rectocele (cystocele assessed in supine and standing)    TREATMENT       Therapeutic Exercise to develop  strength, endurance, ROM, flexibility, posture, and core stabilization for 25 minutes including:     -Recumbent stepper level 3 x8 minutes Not performed today   -Y's (kneeling on foam) 5lbs, 2x10  -Deadlift 8lbs bar, 2x10  -Supine clam with blue theraband 3x10  -shuttle 75 # with cues to for breath control and pressure management 3 x 10   -standing donkey kicks with shuttle 12# 10 x each side     Not today:  -Serratus anterior punches 2x10, 2lbs  -Thoracic extension [id number]     Neuromuscular Re-education to develop coordination, awareness, posture, stabilization and kinesthetic sense for 30 minutes including:    -Bridge with yoga block between knees, 3x10  -STS with TA activation, emphasis on breath control for pressure management, 3x10  -Pallof press(green theraband)  with lateral walk-outs 4steps each way, 10 laps each way   - diagonal chops low to high and high low purple band 15 x each   -Rows with purple therband while standing on foam 3 x 10   -shoulder extension with purple theraband while standing on foam. 3 x 10     Not today:  -Birddog, small range 5-second holds, 2x10   -Lower trap activation for posture correction  -Abdominal bracing with heel slide for stabilization 2x10  -360 breathing in supine  -Pelvic floor coordination training - inhale/relax, exhale/contract, bearing down  -Pelvic bracing (kegel, transverse abdominis, posterior pelvic tilt)  -Diaphragmatic breathing    Manual Therapy to develop flexibility, extensibility, and desensitization for 00 minutes including:    Trigger point release/myofascial mobilization B obliques (L>R)  Muscle energy technique for R innominate rotation  Trigger point release/myofascial mobilization to left levator ani & bulbospongiosus    Therapeutic Activity to improve understanding, knowledge of condition, bowel/bladder health, functional  static and dynamic mobility for 0 minutes including education as described below:    Home exercise program review (patient's request)    Not today:  Vaginal pelvic floor examination  Patient education for pressure management strategies, cystocele and rectocele precautions, proper toileting mechanics with use of squatty potty    Home exercise program: Posterior pelvic tilt, transverse abdominis activation, hip adductor isometrics, cat/camel, bird-dog, thoracic extension, child's pose, serratus anterior punches, abd brace heel slides    Education: Discussed progression of plan of care with patient; educated pt in activity modification; reviewed HEP with pt. Patient demonstrated and verbalized understanding of all instruction and was provided with a handout of HEP (see Patient Instructions).      ASSESSMENT      Today's treatment focused on advancing core strengthening and pelvic stability. She was able to tolerate advancements well with no onset of pain. Pt puts forth excellent effort with all recommended activities and is expected to continue progressing towards short and long term therapy goals.    Will the patient continue to benefit from skilled PT intervention? Yes    Necessity for continued treatment: Continued pain and difficulty with household chores, IADLs and caring for infant.  Progress towards goals:  good    Goals:  Short Term Goals: 2 weeks   Patient to be 100% independent with home exercise program to facilitate optimal recovery. (PROGRESSING)  Patient to be able to isolate transverse abdominis muscle and prevent rectus abdominis doming during transitional movements. (ACHIEVED)  Patient to be educated in pressure management strategies to prevent hernia development. (ACHIEVED)  Trigger points at left external oblique to resolve 100% to allow full excursion diaphragmatic breathing and optimal posture needed for functional, dynamic movement. (PROGRESSING)  Patient to be educated in proper ergonomics and  breastfeeding posture to prevent exacerbation of symptoms. (ACHIEVED)     Long Term Goals: 12 weeks   Patient to be able to tolerate a 1-hour car ride to be able to attend work and work-related meetings/training. (PROGRESSING)  Patient to tolerate 2-mile walk without increased pelvic/low back pain for improved cardiovascular health. (PROGRESSING)  Core strength to improve by 1/2 muscle grade to improve stabilization during transitional movements and with lifting 2-year-old toddler. (PROGRESSING)  Patient to report 0/10 pelvic pain with IADLs and all forms of exercise for improved overall health. (PROGRESSING)    PLAN     Continue with established Plan of Care, working toward established PT goals.      Plan of care Certification: 3/3/2023 to 05/26/2023.     Outpatient Physical Therapy 1 times weekly for 12 weeks to include the following interventions: therapeutic exercises, therapeutic activity, neuromuscular re-education, manual therapy, modalities PRN, patient/family education, dry needling, and self care/home management. Skilled therapy intervention addressing the evaluative findings and impairments noted above may include: Patient related information education regarding bladder, bowel, and pelvic floor anatomy and function; bladder and bowel health education to include bladder, bowel and food diary as needed. Incorporation of bowel and bladder health and dietary recommendations; instructions in bowel management to include diet, fluids, habit training, and use of supplemental fiber; activities of daily living training to include toileting position modifications and strategies for voiding and defecation. Manual therapy as indicated to include myofascial release/mobilization, connective tissue manipulation, soft tissue mobilization, trigger point release, visceral mobilization, joint mobilization, and muscle energy techniques - techniques to be performed externally and internally with vaginal and/or rectal approach  as necessary to address abdomino-pelvic musculoskeletal dysfunction contributing to diagnosis. Therapeutic exercise instruction for pelvic floor muscle strength and relaxation, core strengthening and trunk stabilization with extremity disassociation, hip flexibility, maintenance of neutral pelvic positioning in sitting, standing and lying down, and with transitional movements. Neuromuscular reeducation for pelvic floor muscle awareness and relaxation response training. EMG and/or biofeedback for pelvic floor musculature training with external and/or internal vaginal or rectal electrode/pressure sensors as indicated. Rectal balloon sensory assessment and retraining. Instruction and review of stress reduction/autonomic nervous system quieting strategies stresses to address sympathetic nervous system response and encourage parasympathetic nervous response to address constipation (symptoms). Incorporation of appropriate treatment strategies into an independent home exercise program.      Jazlyn Law PTA

## 2023-04-04 NOTE — TELEPHONE ENCOUNTER
Called patient to notify of referral sent to Rheumatology, patient on the phone with the Rheumatology office when I called her

## 2023-04-30 ENCOUNTER — PATIENT MESSAGE (OUTPATIENT)
Dept: REHABILITATION | Facility: HOSPITAL | Age: 33
End: 2023-04-30
Payer: COMMERCIAL

## 2023-05-03 ENCOUNTER — PATIENT MESSAGE (OUTPATIENT)
Dept: OBSTETRICS AND GYNECOLOGY | Facility: CLINIC | Age: 33
End: 2023-05-03
Payer: COMMERCIAL

## 2023-05-12 DIAGNOSIS — M89.9 PUBIC BONE PAIN: Primary | ICD-10-CM

## 2023-06-23 PROBLEM — D23.9 DYSPLASTIC NEVUS: Status: ACTIVE | Noted: 2023-06-23

## 2023-08-14 ENCOUNTER — PATIENT MESSAGE (OUTPATIENT)
Dept: FAMILY MEDICINE | Facility: CLINIC | Age: 33
End: 2023-08-14
Payer: COMMERCIAL

## 2023-08-14 DIAGNOSIS — R11.0 NAUSEA: Primary | ICD-10-CM

## 2023-08-14 RX ORDER — ONDANSETRON 8 MG/1
8 TABLET, ORALLY DISINTEGRATING ORAL EVERY 12 HOURS PRN
Qty: 20 TABLET | Refills: 0 | Status: SHIPPED | OUTPATIENT
Start: 2023-08-14 | End: 2023-08-17 | Stop reason: ALTCHOICE

## 2023-08-17 ENCOUNTER — OFFICE VISIT (OUTPATIENT)
Dept: FAMILY MEDICINE | Facility: CLINIC | Age: 33
End: 2023-08-17
Payer: COMMERCIAL

## 2023-08-17 VITALS
HEART RATE: 94 BPM | BODY MASS INDEX: 29.05 KG/M2 | TEMPERATURE: 99 F | OXYGEN SATURATION: 97 % | WEIGHT: 157.88 LBS | HEIGHT: 62 IN | DIASTOLIC BLOOD PRESSURE: 84 MMHG | SYSTOLIC BLOOD PRESSURE: 136 MMHG

## 2023-08-17 DIAGNOSIS — M25.50 ARTHRALGIA, UNSPECIFIED JOINT: Primary | ICD-10-CM

## 2023-08-17 DIAGNOSIS — M25.542 ARTHRALGIA OF BOTH HANDS: ICD-10-CM

## 2023-08-17 DIAGNOSIS — M25.541 ARTHRALGIA OF BOTH HANDS: ICD-10-CM

## 2023-08-17 DIAGNOSIS — K62.89 RECTAL PAIN: ICD-10-CM

## 2023-08-17 DIAGNOSIS — N63.13 MASS OF LOWER OUTER QUADRANT OF RIGHT BREAST: ICD-10-CM

## 2023-08-17 PROCEDURE — 1159F PR MEDICATION LIST DOCUMENTED IN MEDICAL RECORD: ICD-10-PCS | Mod: ,,, | Performed by: FAMILY MEDICINE

## 2023-08-17 PROCEDURE — 99215 PR OFFICE/OUTPT VISIT, EST, LEVL V, 40-54 MIN: ICD-10-PCS | Mod: ,,, | Performed by: FAMILY MEDICINE

## 2023-08-17 PROCEDURE — 3008F PR BODY MASS INDEX (BMI) DOCUMENTED: ICD-10-PCS | Mod: ,,, | Performed by: FAMILY MEDICINE

## 2023-08-17 PROCEDURE — 99215 OFFICE O/P EST HI 40 MIN: CPT | Mod: ,,, | Performed by: FAMILY MEDICINE

## 2023-08-17 PROCEDURE — 3008F BODY MASS INDEX DOCD: CPT | Mod: ,,, | Performed by: FAMILY MEDICINE

## 2023-08-17 PROCEDURE — 3075F PR MOST RECENT SYSTOLIC BLOOD PRESS GE 130-139MM HG: ICD-10-PCS | Mod: ,,, | Performed by: FAMILY MEDICINE

## 2023-08-17 PROCEDURE — 3044F PR MOST RECENT HEMOGLOBIN A1C LEVEL <7.0%: ICD-10-PCS | Mod: ,,, | Performed by: FAMILY MEDICINE

## 2023-08-17 PROCEDURE — 3079F PR MOST RECENT DIASTOLIC BLOOD PRESSURE 80-89 MM HG: ICD-10-PCS | Mod: ,,, | Performed by: FAMILY MEDICINE

## 2023-08-17 PROCEDURE — 3079F DIAST BP 80-89 MM HG: CPT | Mod: ,,, | Performed by: FAMILY MEDICINE

## 2023-08-17 PROCEDURE — 1159F MED LIST DOCD IN RCRD: CPT | Mod: ,,, | Performed by: FAMILY MEDICINE

## 2023-08-17 PROCEDURE — 3075F SYST BP GE 130 - 139MM HG: CPT | Mod: ,,, | Performed by: FAMILY MEDICINE

## 2023-08-17 PROCEDURE — 3044F HG A1C LEVEL LT 7.0%: CPT | Mod: ,,, | Performed by: FAMILY MEDICINE

## 2023-08-17 NOTE — PROGRESS NOTES
Subjective:       Patient ID: Catie Pendleton is a 33 y.o. female.    Chief Complaint: Rectal Pain (With bowel movements) and Breast Mass (right)    Ms Pendleton a 33-year-old female who presents today as for follow-up on her joint pain.  She was here in March 2023 with complaints of joint pain and an arthritis panel was done.  It was negative except for a mild elevated sed rate and weakly positive MARK.  She was referred to rheumatology as she requested a rheumatologist.  Rheumatology would not see her as her lab work did not warrant an office visit per their standards.  She is now requesting a repeat 1 her autoimmune panel.  Since she had a child in January of 2023 she wanted to know if perhaps her labs have now normalized.    She also states that she is noticed a breast lump in the right breast at about 7o'clock. in the lower outer quadrant.  She has no history of breast lumps in the past, but she is breast feeding and states that she would rather be proactive than to ignore this new lump.  And exam will be done and a mammogram will be scheduled with an US if needed.    This patient also has an issue with her rectum.  She states that she has a rectocele , as she has had 3 children, with a history of a rectal tear after her 3rd child.  She has never had hemorrhoids.  She has never seen a GI specialist.  She is concerned that her rectal tear has recurred as she has pain with every bowel movement.  She was taking docusate sodium at 1 time, but she is no longer taking.  She has been encouraged to take the Dulcolax to keep the stool soft enough to decrease the strain on anal sphincters.An exam will be done she will be referred to GI.        Review of Systems   Gastrointestinal:  Positive for rectal pain.   Genitourinary:         Breast mass noted by patient in the right lower quadrant at 7 o'clock   Musculoskeletal:  Positive for arthralgias.       Patient Active Problem List   Diagnosis    Intramural leiomyoma of  uterus    Migraine without aura, not intractable, without status migrainosus    Vulvar varices during pregnancy    Lactating mother    COVID-19 affecting pregnancy in first trimester    Exposure to varicella zoster virus (VZV)    Pregnancy    Close exposure to COVID-19 virus    Elevated BP without diagnosis of hypertension    Preeclampsia in postpartum period    Pre-eclampsia in postpartum period    Pelvic pain    Low back pain at multiple sites    Muscle weakness    Diastasis recti    Dysplastic nevus    Rectal pain    Mass of lower outer quadrant of right breast    Arthralgia of both hands       Objective:      Physical Exam  Constitutional:       Appearance: Normal appearance.   HENT:      Head: Normocephalic.      Nose: Nose normal.   Eyes:      Pupils: Pupils are equal, round, and reactive to light.   Abdominal:      Comments: Rectal exam done and possible anal fissure noted between 6-9 o'clock position. No hemorrhoids (external) noted.    Musculoskeletal:         General: Tenderness present.   Skin:     Comments: Breast exam done and breasts are dense and fibrous. Patient is lactating. At approx 7 o'clock a very dense area was noted, possible mass or just fibrocystic tissue   Neurological:      Mental Status: She is alert and oriented to person, place, and time. Mental status is at baseline.   Psychiatric:         Mood and Affect: Mood normal.         Behavior: Behavior normal.         Thought Content: Thought content normal.         Lab Results   Component Value Date    WBC 6.13 03/23/2023    HGB 13.0 03/23/2023    HCT 40.5 03/23/2023     03/23/2023    CHOL 219 (H) 03/23/2023    TRIG 84 03/23/2023    HDL 48 03/23/2023    ALT 27 03/23/2023    AST 20 03/23/2023     03/23/2023    K 4.3 03/23/2023     03/23/2023    CREATININE 0.7 03/23/2023    BUN 14 03/23/2023    CO2 24 03/23/2023    TSH 0.646 03/23/2023    HGBA1C 5.0 03/23/2023     The ASCVD Risk score (Clayton DK, et al., 2019) failed to  "calculate for the following reasons:    The 2019 ASCVD risk score is only valid for ages 40 to 79  Visit Vitals  /84 (BP Location: Right arm, Patient Position: Sitting, BP Method: Medium (Manual))   Pulse 94   Temp 98.8 °F (37.1 °C) (Oral)   Ht 5' 2" (1.575 m)   Wt 71.6 kg (157 lb 13.6 oz)   SpO2 97%   Breastfeeding Yes   BMI 28.87 kg/m²      Assessment:       1. Arthralgia, unspecified joint    2. Rectal pain    3. Mass of lower outer quadrant of right breast    4. Arthralgia of both hands        Plan:       1. Arthralgia, unspecified joint  -     MARK Screen w/Reflex; Future; Expected date: 08/17/2023  -     C-Reactive Protein; Future; Expected date: 08/17/2023  -     Rheumatoid Factor; Future; Expected date: 08/17/2023  -     Sedimentation rate; Future; Expected date: 08/17/2023  -     Mammo Digital Screening Bilat w/ Manuelito; Future; Expected date: 08/17/2023  -     Ambulatory referral/consult to Gastroenterology; Future; Expected date: 08/24/2023    2. Rectal pain  Assessment & Plan:  Possible anal fissure. Use Recticare prior to BM, use colace to keep stool soft and refer to GI      3. Mass of lower outer quadrant of right breast  Assessment & Plan:  Send for mammogram or US, patient is lactating    Orders:  -     US Breast Right Complete; Future; Expected date: 08/17/2023    4. Arthralgia of both hands  Assessment & Plan:  Repeat autoimmune panel at patient request         Follow up if symptoms worsen or fail to improve.    In excessive of 40 minutes total time spent for evaluation and management services. Time included elements of the following: time spent preparing to see patient, obtaining and reviewing separately obtained history, exam, evaluation, counseling and education of patient/family member or care giver, documenting in the HMR, independently interpreting results and communication of results, coordination of care ordering medications, tests, or procedures, referral and communication to other " health care professionals.        Future Appointments       Date Provider Specialty Appt Notes    8/18/2023  Lab labs    8/18/2023  Radiology mammo    10/10/2023 Evelin Mena MD Urogynecology 4 month fu

## 2023-08-18 ENCOUNTER — LAB VISIT (OUTPATIENT)
Dept: LAB | Facility: HOSPITAL | Age: 33
End: 2023-08-18
Attending: FAMILY MEDICINE
Payer: COMMERCIAL

## 2023-08-18 ENCOUNTER — TELEPHONE (OUTPATIENT)
Dept: FAMILY MEDICINE | Facility: CLINIC | Age: 33
End: 2023-08-18
Payer: COMMERCIAL

## 2023-08-18 DIAGNOSIS — M25.50 ARTHRALGIA, UNSPECIFIED JOINT: ICD-10-CM

## 2023-08-18 LAB
CRP SERPL-MCNC: 1.1 MG/L (ref 0–8.2)
ERYTHROCYTE [SEDIMENTATION RATE] IN BLOOD BY WESTERGREN METHOD: 12 MM/HR (ref 0–20)

## 2023-08-18 PROCEDURE — 86431 RHEUMATOID FACTOR QUANT: CPT | Performed by: FAMILY MEDICINE

## 2023-08-18 PROCEDURE — 86039 ANTINUCLEAR ANTIBODIES (ANA): CPT | Performed by: FAMILY MEDICINE

## 2023-08-18 PROCEDURE — 86140 C-REACTIVE PROTEIN: CPT | Performed by: FAMILY MEDICINE

## 2023-08-18 PROCEDURE — 85651 RBC SED RATE NONAUTOMATED: CPT | Performed by: FAMILY MEDICINE

## 2023-08-21 LAB
ANA PAT SER IF-IMP: NORMAL
ANA PAT SER IF-IMP: NORMAL
ANA SER QL HEP2 SUBST: NORMAL
ANA TITR SER HEP2 SUBST: NORMAL {TITER}
ANA TITR SER HEP2 SUBST: NORMAL {TITER}
CYTOPLASMIC AB PATTERN SER IF-IMP: NORMAL
LABORATORY COMMENT REPORT: NORMAL
RHEUMATOID FACT SERPL-ACNC: <15 IU/ML

## 2023-08-31 ENCOUNTER — HOSPITAL ENCOUNTER (OUTPATIENT)
Dept: RADIOLOGY | Facility: HOSPITAL | Age: 33
Discharge: HOME | End: 2023-08-31
Attending: FAMILY MEDICINE
Payer: COMMERCIAL

## 2023-08-31 DIAGNOSIS — N63.13 MASS OF LOWER OUTER QUADRANT OF RIGHT BREAST: ICD-10-CM

## 2023-08-31 DIAGNOSIS — N63.0 BREAST LUMP: ICD-10-CM

## 2023-08-31 PROCEDURE — 77066 DX MAMMO INCL CAD BI: CPT | Mod: 26,,, | Performed by: RADIOLOGY

## 2023-08-31 PROCEDURE — 76642 ULTRASOUND BREAST LIMITED: CPT | Mod: TC,RT

## 2023-08-31 PROCEDURE — 76642 ULTRASOUND BREAST LIMITED: CPT | Mod: 26,RT,, | Performed by: RADIOLOGY

## 2023-08-31 PROCEDURE — 76642 US BREAST RIGHT LIMITED: ICD-10-PCS | Mod: 26,RT,, | Performed by: RADIOLOGY

## 2023-08-31 PROCEDURE — 77062 BREAST TOMOSYNTHESIS BI: CPT | Mod: 26,,, | Performed by: RADIOLOGY

## 2023-08-31 PROCEDURE — 77066 MAMMO DIGITAL DIAGNOSTIC BILAT WITH TOMO: ICD-10-PCS | Mod: 26,,, | Performed by: RADIOLOGY

## 2023-08-31 PROCEDURE — 77062 MAMMO DIGITAL DIAGNOSTIC BILAT WITH TOMO: ICD-10-PCS | Mod: 26,,, | Performed by: RADIOLOGY

## 2023-08-31 PROCEDURE — 77066 DX MAMMO INCL CAD BI: CPT | Mod: TC

## 2023-10-24 ENCOUNTER — PATIENT MESSAGE (OUTPATIENT)
Dept: FAMILY MEDICINE | Facility: CLINIC | Age: 33
End: 2023-10-24
Payer: COMMERCIAL

## 2023-10-27 ENCOUNTER — OCCUPATIONAL HEALTH (OUTPATIENT)
Dept: URGENT CARE | Facility: CLINIC | Age: 33
End: 2023-10-27
Payer: COMMERCIAL

## 2023-10-27 ENCOUNTER — CLINICAL SUPPORT (OUTPATIENT)
Dept: URGENT CARE | Facility: CLINIC | Age: 33
End: 2023-10-27
Payer: COMMERCIAL

## 2023-10-27 DIAGNOSIS — Z11.1 VISIT FOR TB SKIN TEST: Primary | ICD-10-CM

## 2023-10-27 PROCEDURE — 90686 PR FLU VACCINE, QIIV4, NO PRSV, 0.5 ML, IM: ICD-10-PCS | Mod: S$GLB,,, | Performed by: EMERGENCY MEDICINE

## 2023-10-27 PROCEDURE — 86580 TB INTRADERMAL TEST: CPT | Mod: S$GLB,,, | Performed by: EMERGENCY MEDICINE

## 2023-10-27 PROCEDURE — 86580 PR  TB INTRADERMAL TEST: ICD-10-PCS | Mod: S$GLB,,, | Performed by: EMERGENCY MEDICINE

## 2023-10-27 PROCEDURE — 90471 IMMUNIZATION ADMIN: CPT | Mod: S$GLB,,, | Performed by: EMERGENCY MEDICINE

## 2023-10-27 PROCEDURE — 90471 PR IMMUNIZ ADMIN,1 SINGLE/COMB VAC/TOXOID: ICD-10-PCS | Mod: S$GLB,,, | Performed by: EMERGENCY MEDICINE

## 2023-10-27 PROCEDURE — 90686 IIV4 VACC NO PRSV 0.5 ML IM: CPT | Mod: S$GLB,,, | Performed by: EMERGENCY MEDICINE

## 2023-11-10 ENCOUNTER — OFFICE VISIT (OUTPATIENT)
Dept: UROGYNECOLOGY | Facility: CLINIC | Age: 33
End: 2023-11-10
Payer: COMMERCIAL

## 2023-11-10 VITALS
BODY MASS INDEX: 28.9 KG/M2 | SYSTOLIC BLOOD PRESSURE: 113 MMHG | WEIGHT: 158 LBS | HEART RATE: 85 BPM | DIASTOLIC BLOOD PRESSURE: 78 MMHG

## 2023-11-10 DIAGNOSIS — M79.18 MYOFASCIAL PAIN: ICD-10-CM

## 2023-11-10 DIAGNOSIS — N81.11 CYSTOCELE, MIDLINE: Primary | ICD-10-CM

## 2023-11-10 PROCEDURE — 1159F PR MEDICATION LIST DOCUMENTED IN MEDICAL RECORD: ICD-10-PCS | Mod: CPTII,S$GLB,, | Performed by: OBSTETRICS & GYNECOLOGY

## 2023-11-10 PROCEDURE — 99213 OFFICE O/P EST LOW 20 MIN: CPT | Mod: S$GLB,,, | Performed by: OBSTETRICS & GYNECOLOGY

## 2023-11-10 PROCEDURE — 3078F DIAST BP <80 MM HG: CPT | Mod: CPTII,S$GLB,, | Performed by: OBSTETRICS & GYNECOLOGY

## 2023-11-10 PROCEDURE — 3074F PR MOST RECENT SYSTOLIC BLOOD PRESSURE < 130 MM HG: ICD-10-PCS | Mod: CPTII,S$GLB,, | Performed by: OBSTETRICS & GYNECOLOGY

## 2023-11-10 PROCEDURE — 3008F BODY MASS INDEX DOCD: CPT | Mod: CPTII,S$GLB,, | Performed by: OBSTETRICS & GYNECOLOGY

## 2023-11-10 PROCEDURE — 3008F PR BODY MASS INDEX (BMI) DOCUMENTED: ICD-10-PCS | Mod: CPTII,S$GLB,, | Performed by: OBSTETRICS & GYNECOLOGY

## 2023-11-10 PROCEDURE — 3078F PR MOST RECENT DIASTOLIC BLOOD PRESSURE < 80 MM HG: ICD-10-PCS | Mod: CPTII,S$GLB,, | Performed by: OBSTETRICS & GYNECOLOGY

## 2023-11-10 PROCEDURE — 3044F HG A1C LEVEL LT 7.0%: CPT | Mod: CPTII,S$GLB,, | Performed by: OBSTETRICS & GYNECOLOGY

## 2023-11-10 PROCEDURE — 99213 PR OFFICE/OUTPT VISIT, EST, LEVL III, 20-29 MIN: ICD-10-PCS | Mod: S$GLB,,, | Performed by: OBSTETRICS & GYNECOLOGY

## 2023-11-10 PROCEDURE — 3044F PR MOST RECENT HEMOGLOBIN A1C LEVEL <7.0%: ICD-10-PCS | Mod: CPTII,S$GLB,, | Performed by: OBSTETRICS & GYNECOLOGY

## 2023-11-10 PROCEDURE — 99999 PR PBB SHADOW E&M-EST. PATIENT-LVL III: ICD-10-PCS | Mod: PBBFAC,,, | Performed by: OBSTETRICS & GYNECOLOGY

## 2023-11-10 PROCEDURE — 3074F SYST BP LT 130 MM HG: CPT | Mod: CPTII,S$GLB,, | Performed by: OBSTETRICS & GYNECOLOGY

## 2023-11-10 PROCEDURE — 99999 PR PBB SHADOW E&M-EST. PATIENT-LVL III: CPT | Mod: PBBFAC,,, | Performed by: OBSTETRICS & GYNECOLOGY

## 2023-11-10 PROCEDURE — 1159F MED LIST DOCD IN RCRD: CPT | Mod: CPTII,S$GLB,, | Performed by: OBSTETRICS & GYNECOLOGY

## 2023-11-10 NOTE — PROGRESS NOTES
Chief Complaint   Patient presents with    Follow-up        HPI: Patient is a 33 y.o. female  who was last seen 3/23/23 presents today for a follow up visit. Patient is breast feeding. She has had two menstrual cycles since her delivery on 23.   She states that her pelvic pain is much better. She reports that she went to [employer] for 4-5 days and she states that after a lot of daily walking her pain became intense. She took ibuprofen and rested for about an hour which helped with the pain and she was able to finish the day of activities. Reports that briskly walking/ running at times would trigger back, buttock, hip and pelvic pain. she has been resting since her return and feels better.     She may sometimes notice vaginal heaviness or pressure.Occurs if she does not pay attention. She is very mindful typically and has been shown by her PT what to do to avoid symptoms. Has done PT for the last 4 months weekly.     She may have a post void dribble. She is trying to fully relax when she urinates and that helps. She denies urinary incontinence.   Has been sexually active. Denies dyspareunia.       REVIEW OF SYSTEMS:  A full 14-point ROS was performed and was significant for those  mentioned in the HPI.    The following portions of the patient's history were reviewed and updated as appropriate: allergies, current medications, past medical history, past surgical history and problem list.    PHYSICAL EXAMINATION    Vitals:    11/10/23 0920   BP: 113/78   Pulse: 85        General: Healthy in appearance, Well nourished, Affect Normal, NAD.  Gastrointestinal: Non tender, Non distended, No masses, guarding or  rebound, No hepatosplenomegally, No hernia.  Ext: No clubbing, cyanosis, edema or varicosities.    Genitourinary-  Vulva: normal without lesions, masses, atrophy or pain  Urethra: meatus central and normal in appearance, no prolapse/caruncle, no masses or discharge. Empty supine stress test was  negative.  Bladder: non-tender, no masses  Vagina: No discharge or lesions, no atrophy, no masses appreciated.  [See POP-Q]  Cervix: no lesions, no discharge  Uterus:  non-tender, anteverted, approximately 6 weeks size  Adnexa: no masses, non tender.  Rectal: deferred    Levator strength: 1/5  Levator tenderness: left 6/10 and right 3/10    POP-Q Exam- pelvic organ prolapse quantitative    Aa -1  Anterior Wall Ba -1  Anterior wall C -5  Cervix or cuff   Gh 4.5    Genital hiatus pb 3    perineal body tvl 10.5    Total vaginal length   Ap -2.5  Posterior wall Bp -2.5  Posterior wall D -10.5  Posterior formix     withUterus       No visits with results within 1 Day(s) from this visit.   Latest known visit with results is:   Lab Visit on 08/18/2023   Component Date Value Ref Range Status    MARK IgG by IFA 08/18/2023 <1:80 (Negative)  <1:80 (Negative) Final    MARK Titer 08/18/2023 Test Not Performed   Final    MARK Pattern 08/18/2023 Test Not Performed   Final    MARK Titer 2 08/18/2023 Test Not Performed   Final    MARK Pattern 2 08/18/2023 Test Not Performed   Final    Antinuclear Cytoplasmic Pattern 08/18/2023 Test Not Performed   Final    MARK Lab Comment 08/18/2023 Test Not Performed   Final    CRP 08/18/2023 1.1  0.0 - 8.2 mg/L Final    Rheumatoid Factor 08/18/2023 <15  <15 IU/mL Final    Sed Rate 08/18/2023 12  0 - 20 mm/Hr Final        ASSESSMENT & PLAN:  Cystocele, midline    Myofascial pain       32 yo with a stage 2 anterior vaginal prolapse and myofascial pain presents today for a follow up visit. We discussed that her prolapse is stable. She appears to have had a flare in her myofascial pain. Reviewed that PT will help her get back on track but also it sounds like it is time to work on some strength training and conditioning. She continues to have pelvic muscle weakness.    At this time she would greatly benefit from additional skilled pelvic floor myofascial release followed by strengthening given the  generalized muscle weakness.   She will return for re-evaluation in 4 months.     All questions were answered today. The patient was encouraged to contact the office as needed with any additional questions or concerns.     Total time spent on visit was 20 minutes.  This includes face to face time and non-face to face time preparing to see the patient (eg, review of tests), Obtaining and/or reviewing separately obtained history, Documenting clinical information in the electronic or other health record, Independently interpreting resultsand communicating results to the patient/family/caregiver, or Care coordination.    Evelin Mena MD

## 2024-01-02 ENCOUNTER — PATIENT MESSAGE (OUTPATIENT)
Dept: FAMILY MEDICINE | Facility: CLINIC | Age: 34
End: 2024-01-02
Payer: COMMERCIAL

## 2025-08-20 ENCOUNTER — PATIENT MESSAGE (OUTPATIENT)
Dept: ADMINISTRATIVE | Facility: HOSPITAL | Age: 35
End: 2025-08-20
Payer: COMMERCIAL